# Patient Record
Sex: FEMALE | Race: BLACK OR AFRICAN AMERICAN | NOT HISPANIC OR LATINO | URBAN - METROPOLITAN AREA
[De-identification: names, ages, dates, MRNs, and addresses within clinical notes are randomized per-mention and may not be internally consistent; named-entity substitution may affect disease eponyms.]

---

## 2018-02-04 ENCOUNTER — INPATIENT (INPATIENT)
Facility: HOSPITAL | Age: 43
LOS: 1 days | Discharge: HOME | DRG: 156 | End: 2018-02-06
Attending: OTOLARYNGOLOGY | Admitting: EMERGENCY MEDICINE
Payer: COMMERCIAL

## 2018-02-04 VITALS
RESPIRATION RATE: 20 BRPM | DIASTOLIC BLOOD PRESSURE: 111 MMHG | HEART RATE: 84 BPM | TEMPERATURE: 100 F | OXYGEN SATURATION: 100 % | SYSTOLIC BLOOD PRESSURE: 172 MMHG

## 2018-02-04 DIAGNOSIS — R22.0 LOCALIZED SWELLING, MASS AND LUMP, HEAD: ICD-10-CM

## 2018-02-04 DIAGNOSIS — Z98.89 OTHER SPECIFIED POSTPROCEDURAL STATES: Chronic | ICD-10-CM

## 2018-02-04 DIAGNOSIS — K11.20 SIALOADENITIS, UNSPECIFIED: ICD-10-CM

## 2018-02-04 LAB
ALBUMIN SERPL ELPH-MCNC: 3.9 G/DL — SIGNIFICANT CHANGE UP (ref 3.3–5)
ALP SERPL-CCNC: 51 U/L — SIGNIFICANT CHANGE UP (ref 40–120)
ALT FLD-CCNC: 10 U/L RC — SIGNIFICANT CHANGE UP (ref 10–45)
ANION GAP SERPL CALC-SCNC: 12 MMOL/L — SIGNIFICANT CHANGE UP (ref 5–17)
AST SERPL-CCNC: 16 U/L — SIGNIFICANT CHANGE UP (ref 10–40)
BASOPHILS # BLD AUTO: 0 K/UL — SIGNIFICANT CHANGE UP (ref 0–0.2)
BASOPHILS NFR BLD AUTO: 0.2 % — SIGNIFICANT CHANGE UP (ref 0–2)
BILIRUB SERPL-MCNC: 0.6 MG/DL — SIGNIFICANT CHANGE UP (ref 0.2–1.2)
BUN SERPL-MCNC: 10 MG/DL — SIGNIFICANT CHANGE UP (ref 7–23)
CALCIUM SERPL-MCNC: 9.1 MG/DL — SIGNIFICANT CHANGE UP (ref 8.4–10.5)
CHLORIDE SERPL-SCNC: 100 MMOL/L — SIGNIFICANT CHANGE UP (ref 96–108)
CO2 SERPL-SCNC: 24 MMOL/L — SIGNIFICANT CHANGE UP (ref 22–31)
CREAT SERPL-MCNC: 0.85 MG/DL — SIGNIFICANT CHANGE UP (ref 0.5–1.3)
EOSINOPHIL # BLD AUTO: 0.1 K/UL — SIGNIFICANT CHANGE UP (ref 0–0.5)
EOSINOPHIL NFR BLD AUTO: 0.9 % — SIGNIFICANT CHANGE UP (ref 0–6)
GLUCOSE SERPL-MCNC: 99 MG/DL — SIGNIFICANT CHANGE UP (ref 70–99)
HCT VFR BLD CALC: 28.6 % — LOW (ref 34.5–45)
HGB BLD-MCNC: 9 G/DL — LOW (ref 11.5–15.5)
LYMPHOCYTES # BLD AUTO: 0.6 K/UL — LOW (ref 1–3.3)
LYMPHOCYTES # BLD AUTO: 9 % — LOW (ref 13–44)
MCHC RBC-ENTMCNC: 20.7 PG — LOW (ref 27–34)
MCHC RBC-ENTMCNC: 31.5 GM/DL — LOW (ref 32–36)
MCV RBC AUTO: 65.6 FL — LOW (ref 80–100)
MONOCYTES # BLD AUTO: 0.9 K/UL — SIGNIFICANT CHANGE UP (ref 0–0.9)
MONOCYTES NFR BLD AUTO: 13.6 % — SIGNIFICANT CHANGE UP (ref 2–14)
NEUTROPHILS # BLD AUTO: 5.2 K/UL — SIGNIFICANT CHANGE UP (ref 1.8–7.4)
NEUTROPHILS NFR BLD AUTO: 76.3 % — SIGNIFICANT CHANGE UP (ref 43–77)
PLATELET # BLD AUTO: 396 K/UL — SIGNIFICANT CHANGE UP (ref 150–400)
POTASSIUM SERPL-MCNC: 3.3 MMOL/L — LOW (ref 3.5–5.3)
POTASSIUM SERPL-SCNC: 3.3 MMOL/L — LOW (ref 3.5–5.3)
PROT SERPL-MCNC: 7.7 G/DL — SIGNIFICANT CHANGE UP (ref 6–8.3)
RAPID RVP RESULT: SIGNIFICANT CHANGE UP
RBC # BLD: 4.35 M/UL — SIGNIFICANT CHANGE UP (ref 3.8–5.2)
RBC # FLD: 19.7 % — HIGH (ref 10.3–14.5)
S PYO AG SPEC QL IA: NEGATIVE — SIGNIFICANT CHANGE UP
SODIUM SERPL-SCNC: 136 MMOL/L — SIGNIFICANT CHANGE UP (ref 135–145)
WBC # BLD: 6.9 K/UL — SIGNIFICANT CHANGE UP (ref 3.8–10.5)
WBC # FLD AUTO: 6.9 K/UL — SIGNIFICANT CHANGE UP (ref 3.8–10.5)

## 2018-02-04 PROCEDURE — 70491 CT SOFT TISSUE NECK W/DYE: CPT | Mod: 26

## 2018-02-04 PROCEDURE — 99285 EMERGENCY DEPT VISIT HI MDM: CPT

## 2018-02-04 RX ORDER — HYDROCHLOROTHIAZIDE 25 MG
12.5 TABLET ORAL ONCE
Qty: 0 | Refills: 0 | Status: COMPLETED | OUTPATIENT
Start: 2018-02-04 | End: 2018-02-04

## 2018-02-04 RX ORDER — ACETAMINOPHEN 500 MG
1000 TABLET ORAL ONCE
Qty: 0 | Refills: 0 | Status: COMPLETED | OUTPATIENT
Start: 2018-02-04 | End: 2018-02-04

## 2018-02-04 RX ORDER — SODIUM CHLORIDE 9 MG/ML
1000 INJECTION INTRAMUSCULAR; INTRAVENOUS; SUBCUTANEOUS ONCE
Qty: 0 | Refills: 0 | Status: COMPLETED | OUTPATIENT
Start: 2018-02-04 | End: 2018-02-04

## 2018-02-04 RX ORDER — DEXAMETHASONE 0.5 MG/5ML
10 ELIXIR ORAL EVERY 8 HOURS
Qty: 0 | Refills: 0 | Status: DISCONTINUED | OUTPATIENT
Start: 2018-02-04 | End: 2018-02-06

## 2018-02-04 RX ORDER — SODIUM CHLORIDE 9 MG/ML
1000 INJECTION INTRAMUSCULAR; INTRAVENOUS; SUBCUTANEOUS
Qty: 0 | Refills: 0 | Status: DISCONTINUED | OUTPATIENT
Start: 2018-02-04 | End: 2018-02-06

## 2018-02-04 RX ORDER — POTASSIUM CHLORIDE 20 MEQ
20 PACKET (EA) ORAL ONCE
Qty: 0 | Refills: 0 | Status: COMPLETED | OUTPATIENT
Start: 2018-02-04 | End: 2018-02-04

## 2018-02-04 RX ORDER — DEXAMETHASONE 0.5 MG/5ML
10 ELIXIR ORAL ONCE
Qty: 0 | Refills: 0 | Status: COMPLETED | OUTPATIENT
Start: 2018-02-04 | End: 2018-02-04

## 2018-02-04 RX ORDER — VALSARTAN 80 MG/1
160 TABLET ORAL ONCE
Qty: 0 | Refills: 0 | Status: COMPLETED | OUTPATIENT
Start: 2018-02-04 | End: 2018-02-04

## 2018-02-04 RX ORDER — ACETAMINOPHEN 500 MG
650 TABLET ORAL EVERY 6 HOURS
Qty: 0 | Refills: 0 | Status: DISCONTINUED | OUTPATIENT
Start: 2018-02-04 | End: 2018-02-06

## 2018-02-04 RX ADMIN — VALSARTAN 160 MILLIGRAM(S): 80 TABLET ORAL at 13:58

## 2018-02-04 RX ADMIN — Medication 1000 MILLIGRAM(S): at 12:59

## 2018-02-04 RX ADMIN — SODIUM CHLORIDE 2000 MILLILITER(S): 9 INJECTION INTRAMUSCULAR; INTRAVENOUS; SUBCUTANEOUS at 12:29

## 2018-02-04 RX ADMIN — SODIUM CHLORIDE 75 MILLILITER(S): 9 INJECTION INTRAMUSCULAR; INTRAVENOUS; SUBCUTANEOUS at 13:59

## 2018-02-04 RX ADMIN — Medication 102 MILLIGRAM(S): at 21:15

## 2018-02-04 RX ADMIN — Medication 20 MILLIEQUIVALENT(S): at 13:15

## 2018-02-04 RX ADMIN — Medication 100 MILLIGRAM(S): at 21:15

## 2018-02-04 RX ADMIN — Medication 100 MILLIGRAM(S): at 13:59

## 2018-02-04 RX ADMIN — Medication 400 MILLIGRAM(S): at 12:29

## 2018-02-04 RX ADMIN — Medication 12.5 MILLIGRAM(S): at 13:58

## 2018-02-04 RX ADMIN — Medication 102 MILLIGRAM(S): at 14:50

## 2018-02-04 NOTE — ED PROVIDER NOTE - OBJECTIVE STATEMENT
42F with HTN and anemia presents with L neck swelling, fever, and malaise x1 day. Pt had an sore throat 5 days ago without other URI symptoms under yesterday in the AM when she woke up with the neck swelling. She developed generalized malaise during the day and had a fever of 101F last night. Has odynophagia, throat feels congested with sputum but no cough. Also has headache, 6/10 severity. No dyspnea or chest pain. No nasal congestion or other URI symptoms. No recent dental procedures. No rashes. No abdominal pain or diarrhea. She never had similar neck swelling the past.

## 2018-02-04 NOTE — H&P ADULT - HISTORY OF PRESENT ILLNESS
41 y/o female with PMHx of HTN and anemia c/o L neck swelling, fever, and malaise x1 day. Patients reports she has had a sore throat for 5 days without other URI symptoms. Patient reports that yesterday AM she woke up with the neck swelling. She developed generalized malaise during the day and had a fever of 101F last night. Reports odynophagia, throat feels congested with sputum but no cough. Also has headache, 6/10 severity. No dyspnea or chest pain. No nasal congestion or other URI symptoms. No recent dental procedures. No rashes. No abdominal pain or diarrhea. She never had similar neck swelling the past. 41 y/o female with PMHx of HTN and anemia c/o L neck swelling, fever, and malaise x1 day. Patients reports she has had a sore throat for 5 days without other URI symptoms. Patient reports that yesterday AM she woke up with the neck swelling and developed generalized malaise during the day with a fever of 101F last night. Reports odynophagia, throat congested with sputum but no cough, headache, 6/10 severity. No dyspnea or chest pain. No nasal congestion or other URI symptoms. No recent dental procedures. No rashes. No abdominal pain or diarrhea. She never had similar neck swelling the past.

## 2018-02-04 NOTE — H&P ADULT - NSHPLABSRESULTS_GEN_ALL_CORE
CC:    Vital Signs Last 24 Hrs  T(C): 37.4 (04 Feb 2018 16:11), Max: 37.7 (04 Feb 2018 11:03)  T(F): 99.3 (04 Feb 2018 16:11), Max: 99.9 (04 Feb 2018 13:12)  HR: 84 (04 Feb 2018 16:11) (81 - 98)  BP: 163/88 (04 Feb 2018 16:11) (155/96 - 176/100)  BP(mean): --  RR: 18 (04 Feb 2018 16:11) (18 - 20)  SpO2: 94% (04 Feb 2018 16:11) (94% - 100%)                          9.0    6.9   )-----------( 396      ( 04 Feb 2018 12:29 )             28.6    02-04    136  |  100  |  10  ----------------------------<  99  3.3<L>   |  24  |  0.85    Ca    9.1      04 Feb 2018 12:15    TPro  7.7  /  Alb  3.9  /  TBili  0.6  /  DBili  x   /  AST  16  /  ALT  10  /  AlkPhos  51  02-04      CT of the Neck  Acute sialoadenitis of the left submandibular gland without associated   abscess formation. Vital Signs Last 24 Hrs  T(C): 37.4 (04 Feb 2018 16:11), Max: 37.7 (04 Feb 2018 11:03)  T(F): 99.3 (04 Feb 2018 16:11), Max: 99.9 (04 Feb 2018 13:12)  HR: 84 (04 Feb 2018 16:11) (81 - 98)  BP: 163/88 (04 Feb 2018 16:11) (155/96 - 176/100)  BP(mean): --  RR: 18 (04 Feb 2018 16:11) (18 - 20)  SpO2: 94% (04 Feb 2018 16:11) (94% - 100%)                          9.0    6.9   )-----------( 396      ( 04 Feb 2018 12:29 )             28.6    02-04    136  |  100  |  10  ----------------------------<  99  3.3<L>   |  24  |  0.85    Ca    9.1      04 Feb 2018 12:15    TPro  7.7  /  Alb  3.9  /  TBili  0.6  /  DBili  x   /  AST  16  /  ALT  10  /  AlkPhos  51  02-04      CT of the Neck  Acute sialoadenitis of the left submandibular gland without associated   abscess formation.

## 2018-02-04 NOTE — ED PROVIDER NOTE - PHYSICAL EXAMINATION
General: Appears comfortable, nontoxic appearing  Skin: Warm and dry, no rashes  Neuro: AAOx3, nonfocal  HEENT: PERRL, EOMI, no oral lesions  Neck: +L submandibular erythema, warmth, and swelling without contralateral LN abnormalities. Full range of motion, no JVD  Lymph: +L submandibular  and cervical lymphadenopathy, no R submandibular or cervical lymphadenopathy, no submental, auricular, supraclavicular, or axillary lymphadenopathy   Lungs: Clear to ascultation bilaterally, no wheezes, rales, or rhonchi   Heart: Regular rate and rhythm, no murmurs  Abdomen: soft, nontender, nondistended  Extremities: No pedal edema or LE tenderness  Vascular: 2+ dorsalis pedis and radial pulses  Psych: normal mood and affect

## 2018-02-04 NOTE — ED PROVIDER NOTE - ATTENDING CONTRIBUTION TO CARE
****ATTENDING**** 41yo f hx HTN pw L facial swelling and fever x 1 day. Pt reports sore throat on tuesday that resolved. yesterday w swelling and fever and this morning w marked swelling. No recent travel. No cough. No sob, palp. + able to swallow and no difficulty breathing.   On exam, Patient is awake,alert,oriented x 3. Patient is well appearing and in no acute distress. airway intact. + L submandibular swelling and tenderness. Patient's chest is clear to ausculation, +s1s2. Abdomen is soft nd/nt +BS. Extremity with no swelling or calf tenderness.   Check labs, CT to eval for sailoadenitis, lymphadenitis, abscess.

## 2018-02-04 NOTE — H&P ADULT - NSHPPHYSICALEXAM_GEN_ALL_CORE
General: Appears comfortable  Skin: Warm and dry, no rashes  HEENT: PERRL, EOMI, no oral lesions  Neck: +L submandibular erythema, warmth, and swelling without contralateral LN abnormalities. Full range of motion  Resp: no use of accessory muscles, no stridor  CV: no peripheral edema/cyanosis General: Appears comfortable  Skin: Warm and dry, no rashes  HEENT: PERRL, EOMI, no oral lesions  Neck: +L submandibular erythema, warmth, and swelling without contralateral LN abnormalities. Full range of motion  Resp: no use of accessory muscles, no stridor  CV: no peripheral edema/cyanosis    Laryngoscopy done at bedside with scope #4:  No bleeding in nasal pharynx or Oral pharynx.  No foreign body or pooling of secretions.  No glottic / supraglottic swelling.  Vocal cords mobile in intact b/l.  Airway patent.

## 2018-02-04 NOTE — PROGRESS NOTE ADULT - SUBJECTIVE AND OBJECTIVE BOX
POD/STATUS POST/ENT ISSUE: Left Submandibular Sialoadenitis     INTERVAL HPI: 41 y/o female found to have Left Submandibular Sialoadenitis on CT. Tolerating PO, pain slightly improved.         Vital Signs Last 24 Hrs  T(C): 37.4 (04 Feb 2018 16:11), Max: 37.7 (04 Feb 2018 11:03)  T(F): 99.3 (04 Feb 2018 16:11), Max: 99.9 (04 Feb 2018 13:12)  HR: 84 (04 Feb 2018 16:11) (81 - 98)  BP: 163/88 (04 Feb 2018 16:11) (155/96 - 176/100)  BP(mean): --  RR: 18 (04 Feb 2018 16:11) (18 - 20)  SpO2: 94% (04 Feb 2018 16:11) (94% - 100%)    PHYSICAL EXAM:  General: Appears comfortable  Skin: Warm and dry, no rashes  HEENT: PERRL, EOMI, no oral lesions  Neck: +L submandibular erythema, warmth, and swelling without contralateral LN abnormalities. Full range of motion  Resp: no use of accessory muscles, no stridor  CV: no peripheral edema/cyanosis    LABS:                        9.0    6.9   )-----------( 396      ( 04 Feb 2018 12:29 )             28.6       IMAGING/ADDITIONAL STUDIES:    CT of the Neck  Acute sialoadenitis of the left submandibular gland without associated   abscess formation.

## 2018-02-04 NOTE — PROGRESS NOTE ADULT - ASSESSMENT
41 y/o female found to have Left Submandibular Sialoadenitis on CT. Tolerating PO, pain slightly improved. On IV antibiotics, and decadron.

## 2018-02-04 NOTE — H&P ADULT - NSHPREVIEWOFSYSTEMS_GEN_ALL_CORE
CONSTITUTIONAL: +Fever, chills, malaise  EYES: No visual changes or diploplia  ENT: +sore throat, +odynophagia, no dysphagia, rhinorrhea, or nasal congestion  NECK: + L neck pain, no neck stiffness  RESPIRATORY: No cough or shortness of breath  CARDIOVASCULAR: No chest pain or palpitations  GASTROINTESTINAL: No abdominal pain, nausea, vomiting, diarrhea, melena, or hematochezia   GENITOURINARY: No dysuria, flank pain, hematuria, or incomplete emptying  NEUROLOGICAL: No numbness or focal weakness   MUSCULOSKELATAL: No joint pain  HEMATOLOGIC: No bleeding  SKIN: No rash or pruritus

## 2018-02-04 NOTE — ED PROVIDER NOTE - NS ED ROS FT
CONSTITUTIONAL: +Fever, chills, malaise  EYES: No visual changes or diploplia  ENT: +sore throat, +odynophagia, no dysphagia, rhinorrhea, or nasal congestion  NECK: + L neck pain, no neck stiffness  RESPIRATORY: No cough or shortness of breath  CARDIOVASCULAR: No chest pain or palpitations  GASTROINTESTINAL: No abdominal pain, nausea, vomiting, diarrhea, melena, or hematochezia   GENITOURINARY: No dysuria, flank pain, hematuria, or incomplete emptying  NEUROLOGICAL: No numbness or focal weakness   MUSCULOSKELATAL: No joint pain  HEMATOLOGIC: No bleeding  SKIN: No rash or pruritus  All other review of systems is negative unless indicated above

## 2018-02-04 NOTE — ED ADULT NURSE NOTE - OBJECTIVE STATEMENT
42 yr old female with h/o htn present to the ER for fever and swelling to the left side of the mandibular area and neck. Pt reported that she started to notice the swelling yesterday , however today she started to have body aches, fever and chills. Pt reportedly said she had sore throat a couple day ago which had resolved . Pt denies any gums disease, loose teeth or toothache. Pt reported pain level of 8/10 on pain scale and aching in quality. Report that she took Tylenol prior to ER arrival. Denies cough, nasal congestion / ear pain.

## 2018-02-04 NOTE — ED ADULT NURSE NOTE - CHPI ED SYMPTOMS NEG
no rash/no abdominal pain/no shortness of breath/no vomiting/no decreased eating/drinking/no diarrhea/no cough

## 2018-02-05 ENCOUNTER — TRANSCRIPTION ENCOUNTER (OUTPATIENT)
Age: 43
End: 2018-02-05

## 2018-02-05 LAB
HCT VFR BLD CALC: 29 % — LOW (ref 34.5–45)
HGB BLD-MCNC: 9.1 G/DL — LOW (ref 11.5–15.5)
MCHC RBC-ENTMCNC: 20.6 PG — LOW (ref 27–34)
MCHC RBC-ENTMCNC: 31.4 GM/DL — LOW (ref 32–36)
MCV RBC AUTO: 65.7 FL — LOW (ref 80–100)
PLATELET # BLD AUTO: 411 K/UL — HIGH (ref 150–400)
RBC # BLD: 4.41 M/UL — SIGNIFICANT CHANGE UP (ref 3.8–5.2)
RBC # FLD: 19.6 % — HIGH (ref 10.3–14.5)
WBC # BLD: 2.5 K/UL — LOW (ref 3.8–10.5)
WBC # FLD AUTO: 2.5 K/UL — LOW (ref 3.8–10.5)

## 2018-02-05 PROCEDURE — 99254 IP/OBS CNSLTJ NEW/EST MOD 60: CPT

## 2018-02-05 RX ORDER — HYDROCHLOROTHIAZIDE 25 MG
12.5 TABLET ORAL DAILY
Qty: 0 | Refills: 0 | Status: DISCONTINUED | OUTPATIENT
Start: 2018-02-05 | End: 2018-02-06

## 2018-02-05 RX ORDER — VALSARTAN 80 MG/1
160 TABLET ORAL DAILY
Qty: 0 | Refills: 0 | Status: DISCONTINUED | OUTPATIENT
Start: 2018-02-05 | End: 2018-02-06

## 2018-02-05 RX ORDER — AMLODIPINE BESYLATE 2.5 MG/1
5 TABLET ORAL DAILY
Qty: 0 | Refills: 0 | Status: DISCONTINUED | OUTPATIENT
Start: 2018-02-05 | End: 2018-02-06

## 2018-02-05 RX ADMIN — Medication 100 MILLIGRAM(S): at 15:01

## 2018-02-05 RX ADMIN — Medication 102 MILLIGRAM(S): at 21:27

## 2018-02-05 RX ADMIN — Medication 12.5 MILLIGRAM(S): at 13:42

## 2018-02-05 RX ADMIN — Medication 100 MILLIGRAM(S): at 21:27

## 2018-02-05 RX ADMIN — AMLODIPINE BESYLATE 5 MILLIGRAM(S): 2.5 TABLET ORAL at 13:42

## 2018-02-05 RX ADMIN — VALSARTAN 160 MILLIGRAM(S): 80 TABLET ORAL at 16:05

## 2018-02-05 RX ADMIN — SODIUM CHLORIDE 75 MILLILITER(S): 9 INJECTION INTRAMUSCULAR; INTRAVENOUS; SUBCUTANEOUS at 19:42

## 2018-02-05 RX ADMIN — Medication 100 MILLIGRAM(S): at 05:12

## 2018-02-05 RX ADMIN — Medication 102 MILLIGRAM(S): at 05:13

## 2018-02-05 RX ADMIN — Medication 102 MILLIGRAM(S): at 13:38

## 2018-02-05 NOTE — PROGRESS NOTE ADULT - SUBJECTIVE AND OBJECTIVE BOX
POD/STATUS POST/ENT ISSUE: Left Submandibular Sialoadenitis     INTERVAL HPI: 41 y/o female found to have Left Submandibular Sialoadenitis on CT. Tolerating PO, pain slightly improved.       Vital Signs Last 24 Hrs  T(C): 36.9 (05 Feb 2018 09:04), Max: 37.7 (04 Feb 2018 11:03)  T(F): 98.5 (05 Feb 2018 09:04), Max: 99.9 (04 Feb 2018 13:12)  HR: 97 (05 Feb 2018 09:04) (80 - 98)  BP: 156/95 (05 Feb 2018 09:04) (147/94 - 176/100)  BP(mean): --  RR: 18 (05 Feb 2018 09:04) (18 - 20)  SpO2: 97% (05 Feb 2018 09:04) (94% - 100%)    LABS:                        9.1    2.5   )-----------( 411      ( 05 Feb 2018 08:44 )             29.0       PHYSICAL EXAM:  Gen: NAD, well-developed  Head: Normocephalic, Atraumatic  Face: no edema/erythema/fluctuanceEyes:no scleral injection  Nose: Nares bilaterally patent, no discharge  Mouth: No Stridor / Drooling / Trismus.  Mucosa moist, tongue/uvula midline, oropharynx clear  Neck: +L submandibular erythema, warmth, and swelling without contralateral LN abnormalities. Full range of motion  Resp: breathing easily, no stridor  CV: no peripheral edema/cyanosis

## 2018-02-05 NOTE — PROGRESS NOTE ADULT - ASSESSMENT
43 y/o female found to have Left Submandibular Sialoadenitis on CT. Tolerating PO, pain slightly improved. On IV antibiotics, and decadron.

## 2018-02-06 VITALS
DIASTOLIC BLOOD PRESSURE: 80 MMHG | RESPIRATION RATE: 18 BRPM | SYSTOLIC BLOOD PRESSURE: 139 MMHG | HEART RATE: 66 BPM | OXYGEN SATURATION: 99 %

## 2018-02-06 LAB
ANION GAP SERPL CALC-SCNC: 11 MMOL/L — SIGNIFICANT CHANGE UP (ref 5–17)
BUN SERPL-MCNC: 12 MG/DL — SIGNIFICANT CHANGE UP (ref 7–23)
CALCIUM SERPL-MCNC: 9.2 MG/DL — SIGNIFICANT CHANGE UP (ref 8.4–10.5)
CHLORIDE SERPL-SCNC: 104 MMOL/L — SIGNIFICANT CHANGE UP (ref 96–108)
CO2 SERPL-SCNC: 26 MMOL/L — SIGNIFICANT CHANGE UP (ref 22–31)
CREAT SERPL-MCNC: 0.72 MG/DL — SIGNIFICANT CHANGE UP (ref 0.5–1.3)
GLUCOSE SERPL-MCNC: 178 MG/DL — HIGH (ref 70–99)
HCT VFR BLD CALC: 28.9 % — LOW (ref 34.5–45)
HGB BLD-MCNC: 9 G/DL — LOW (ref 11.5–15.5)
MCHC RBC-ENTMCNC: 20.6 PG — LOW (ref 27–34)
MCHC RBC-ENTMCNC: 31.2 GM/DL — LOW (ref 32–36)
MCV RBC AUTO: 66.1 FL — LOW (ref 80–100)
PLATELET # BLD AUTO: 400 K/UL — SIGNIFICANT CHANGE UP (ref 150–400)
POTASSIUM SERPL-MCNC: 3.5 MMOL/L — SIGNIFICANT CHANGE UP (ref 3.5–5.3)
POTASSIUM SERPL-SCNC: 3.5 MMOL/L — SIGNIFICANT CHANGE UP (ref 3.5–5.3)
RBC # BLD: 4.37 M/UL — SIGNIFICANT CHANGE UP (ref 3.8–5.2)
RBC # FLD: 19.7 % — HIGH (ref 10.3–14.5)
SODIUM SERPL-SCNC: 141 MMOL/L — SIGNIFICANT CHANGE UP (ref 135–145)
WBC # BLD: 6.9 K/UL — SIGNIFICANT CHANGE UP (ref 3.8–10.5)
WBC # FLD AUTO: 6.9 K/UL — SIGNIFICANT CHANGE UP (ref 3.8–10.5)

## 2018-02-06 PROCEDURE — 87581 M.PNEUMON DNA AMP PROBE: CPT

## 2018-02-06 PROCEDURE — 80053 COMPREHEN METABOLIC PANEL: CPT

## 2018-02-06 PROCEDURE — 80048 BASIC METABOLIC PNL TOTAL CA: CPT

## 2018-02-06 PROCEDURE — 99285 EMERGENCY DEPT VISIT HI MDM: CPT | Mod: 25

## 2018-02-06 PROCEDURE — 96374 THER/PROPH/DIAG INJ IV PUSH: CPT | Mod: XU

## 2018-02-06 PROCEDURE — 87633 RESP VIRUS 12-25 TARGETS: CPT

## 2018-02-06 PROCEDURE — 87798 DETECT AGENT NOS DNA AMP: CPT

## 2018-02-06 PROCEDURE — 87081 CULTURE SCREEN ONLY: CPT

## 2018-02-06 PROCEDURE — 87486 CHLMYD PNEUM DNA AMP PROBE: CPT

## 2018-02-06 PROCEDURE — 87880 STREP A ASSAY W/OPTIC: CPT

## 2018-02-06 PROCEDURE — 85027 COMPLETE CBC AUTOMATED: CPT

## 2018-02-06 PROCEDURE — 70491 CT SOFT TISSUE NECK W/DYE: CPT

## 2018-02-06 RX ORDER — AMLODIPINE BESYLATE 2.5 MG/1
1 TABLET ORAL
Qty: 0 | Refills: 0 | COMMUNITY

## 2018-02-06 RX ADMIN — AMLODIPINE BESYLATE 5 MILLIGRAM(S): 2.5 TABLET ORAL at 05:38

## 2018-02-06 RX ADMIN — Medication 100 MILLIGRAM(S): at 05:37

## 2018-02-06 RX ADMIN — Medication 12.5 MILLIGRAM(S): at 05:38

## 2018-02-06 RX ADMIN — Medication 102 MILLIGRAM(S): at 05:37

## 2018-02-06 RX ADMIN — VALSARTAN 160 MILLIGRAM(S): 80 TABLET ORAL at 05:38

## 2018-02-06 NOTE — DISCHARGE NOTE ADULT - MEDICATION SUMMARY - MEDICATIONS TO TAKE
I will START or STAY ON the medications listed below when I get home from the hospital:    Diovan HCT 12.5 mg-160 mg oral tablet  -- 1 tab(s) by mouth once a day  -- Indication: For htn    clindamycin 300 mg oral capsule  -- 1 cap(s) by mouth every 8 hours   -- Finish all this medication unless otherwise directed by prescriber.  Medication should be taken with plenty of water.    -- Indication: For infection

## 2018-02-06 NOTE — DISCHARGE NOTE ADULT - CARE PLAN
Principal Discharge DX:	Submandibular sialoadenitis  Goal:	Treat infection  Assessment and plan of treatment:	Acute infection of the salivary gland

## 2018-02-06 NOTE — DISCHARGE NOTE ADULT - HOSPITAL COURSE
43 y/o female with PMHx of HTN and anemia c/o L neck swelling, fever, and malaise x1 day. Patients reports she has had a sore throat for 5 days without other URI symptoms. Patient reports that yesterday AM she woke up with the neck swelling and developed generalized malaise during the day with a fever of 101F last night. Reports odynophagia, throat congested with sputum but no cough, headache, 6/10 severity. No dyspnea or chest pain. No nasal congestion or other URI symptoms. No recent dental procedures. No rashes. No abdominal pain or diarrhea. She never had similar neck swelling the past.    Tx with IV clindamycin and decadron. Blood pressures controlled with diovan, norvasc, HCTZ.

## 2018-02-06 NOTE — PROGRESS NOTE ADULT - PROBLEM SELECTOR PLAN 1
continue clindamycin as outpt.  F/U with Dr. Rider as outpt.  Sour candies PRN  Warm compresses PRN  Pt. to be discharged home today

## 2018-02-06 NOTE — DISCHARGE NOTE ADULT - MEDICATION SUMMARY - MEDICATIONS TO CHANGE
I will SWITCH the dose or number of times a day I take the medications listed below when I get home from the hospital:    Diovan HCT 12.5 mg-160 mg oral tablet  -- 1 tab(s) by mouth once a day    Flagyl 500 mg oral tablet  -- 1 tab(s) by mouth every 8 hours MDD:3  -- Do not drink alcoholic beverages when taking this medication.  Finish all this medication unless otherwise directed by prescriber.  May discolor urine or feces.    Norvasc 5 mg oral tablet  -- 1 tab(s) by mouth once a day

## 2018-02-06 NOTE — PROGRESS NOTE ADULT - ASSESSMENT
[facility] notified.   41 yo female admitted with left submandibular sialoadenitis  treated with antibiotics and steroids.

## 2018-02-06 NOTE — DISCHARGE NOTE ADULT - CARE PROVIDER_API CALL
John Rider), Otolaryngology  [address] [place] [address]  Phone: [phone]  Fax: [phone]

## 2018-02-06 NOTE — PROGRESS NOTE ADULT - SUBJECTIVE AND OBJECTIVE BOX
POD/STATUS POST/ENT ISSUE: Left submandibular sialoadenitis    INTERVAL HPI/OVERNIGHT EVENTS: 43 yo female diagnosed with left sided submandibular sialoadenitis on CT. Pt. is feeling much improved and is able to tolerate a diet. We repeated lab tests this morning (last WBC 2.5 and K 3.3) and saw WBC is now 6.9 and potassium 3.5.   Pt. advised to follow up with Dr. Rider in one week.    Vital Signs Last 24 Hrs  T(C): 36.4 (06 Feb 2018 05:06), Max: 37 (06 Feb 2018 00:52)  T(F): 97.5 (06 Feb 2018 05:06), Max: 98.6 (06 Feb 2018 00:52)  HR: 71 (06 Feb 2018 05:06) (71 - 90)  BP: 144/82 (06 Feb 2018 05:06) (144/82 - 169/84)  BP(mean): --  RR: 18 (06 Feb 2018 05:06) (18 - 18)  SpO2: 98% (06 Feb 2018 05:06) (96% - 98%)    LABS:                        9.0    6.9   )-----------( 400      ( 06 Feb 2018 09:03 )             28.9       PHYSICAL EXAM:  Gen: NAD, well-developed, speaking in full sentences  Head: Normocephalic, Atraumatic  Eyes: PERRL, EOMI, no scleral injection  Nose: Nares bilaterally patent, no discharge  Mouth: Mucosa moist, tongue/uvula midline, oropharynx clear, no trismus, no drooling  Neck : mild swelling noted on left side , no erythema, no tenderness to palpation  Resp: breathing easily, no stridor  CV: no peripheral edema/cyanosis    IMAGING/ADDITIONAL STUDIES:

## 2018-02-06 NOTE — DISCHARGE NOTE ADULT - MEDICATION SUMMARY - MEDICATIONS TO STOP TAKING
I will STOP taking the medications listed below when I get home from the hospital:    doxycycline hyclate 100 mg oral tablet  -- 1 tab(s) by mouth 2 times a day MDD:2  -- Avoid prolonged or excessive exposure to direct and/or artificial sunlight while taking this medication.  Do not take this drug if you are pregnant.  Finish all this medication unless otherwise directed by prescriber.  Medication should be taken with plenty of water.

## 2018-02-06 NOTE — DISCHARGE NOTE ADULT - PATIENT PORTAL LINK FT
You can access [facility], offered by [facility], by registering with the following website: [email or web address]

## 2018-05-09 ENCOUNTER — RESULT REVIEW (OUTPATIENT)
Age: 43
End: 2018-05-09

## 2018-06-07 ENCOUNTER — RESULT REVIEW (OUTPATIENT)
Age: 43
End: 2018-06-07

## 2018-07-31 ENCOUNTER — EMERGENCY (EMERGENCY)
Facility: HOSPITAL | Age: 43
LOS: 1 days | Discharge: HOME | End: 2018-07-31
Attending: EMERGENCY MEDICINE
Payer: COMMERCIAL

## 2018-07-31 VITALS
DIASTOLIC BLOOD PRESSURE: 93 MMHG | TEMPERATURE: 98 F | RESPIRATION RATE: 18 BRPM | HEIGHT: 65 IN | SYSTOLIC BLOOD PRESSURE: 191 MMHG | WEIGHT: 169.98 LBS | OXYGEN SATURATION: 100 % | HEART RATE: 73 BPM

## 2018-07-31 DIAGNOSIS — Z98.89 OTHER SPECIFIED POSTPROCEDURAL STATES: Chronic | ICD-10-CM

## 2018-07-31 LAB
ALBUMIN SERPL ELPH-MCNC: 4.5 G/DL — SIGNIFICANT CHANGE UP (ref 3.3–5)
ALP SERPL-CCNC: 66 U/L — SIGNIFICANT CHANGE UP (ref 40–120)
ALT FLD-CCNC: 12 U/L — SIGNIFICANT CHANGE UP (ref 10–45)
ANION GAP SERPL CALC-SCNC: 14 MMOL/L — SIGNIFICANT CHANGE UP (ref 5–17)
AST SERPL-CCNC: 20 U/L — SIGNIFICANT CHANGE UP (ref 10–40)
BILIRUB SERPL-MCNC: 0.4 MG/DL — SIGNIFICANT CHANGE UP (ref 0.2–1.2)
BUN SERPL-MCNC: 12 MG/DL — SIGNIFICANT CHANGE UP (ref 7–23)
CALCIUM SERPL-MCNC: 10.1 MG/DL — SIGNIFICANT CHANGE UP (ref 8.4–10.5)
CHLORIDE SERPL-SCNC: 98 MMOL/L — SIGNIFICANT CHANGE UP (ref 96–108)
CO2 SERPL-SCNC: 27 MMOL/L — SIGNIFICANT CHANGE UP (ref 22–31)
CREAT SERPL-MCNC: 0.85 MG/DL — SIGNIFICANT CHANGE UP (ref 0.5–1.3)
GLUCOSE SERPL-MCNC: 126 MG/DL — HIGH (ref 70–99)
HCG SERPL-ACNC: <2 MIU/ML — SIGNIFICANT CHANGE UP
HCT VFR BLD CALC: 33.4 % — LOW (ref 34.5–45)
HGB BLD-MCNC: 9.8 G/DL — LOW (ref 11.5–15.5)
MCHC RBC-ENTMCNC: 19 PG — LOW (ref 27–34)
MCHC RBC-ENTMCNC: 29.5 GM/DL — LOW (ref 32–36)
MCV RBC AUTO: 64.3 FL — LOW (ref 80–100)
PLATELET # BLD AUTO: 456 K/UL — HIGH (ref 150–400)
POTASSIUM SERPL-MCNC: 3.3 MMOL/L — LOW (ref 3.5–5.3)
POTASSIUM SERPL-SCNC: 3.3 MMOL/L — LOW (ref 3.5–5.3)
PROT SERPL-MCNC: 8.7 G/DL — HIGH (ref 6–8.3)
RBC # BLD: 5.19 M/UL — SIGNIFICANT CHANGE UP (ref 3.8–5.2)
RBC # FLD: 21.1 % — HIGH (ref 10.3–14.5)
SODIUM SERPL-SCNC: 139 MMOL/L — SIGNIFICANT CHANGE UP (ref 135–145)
WBC # BLD: 10.2 K/UL — SIGNIFICANT CHANGE UP (ref 3.8–10.5)
WBC # FLD AUTO: 10.2 K/UL — SIGNIFICANT CHANGE UP (ref 3.8–10.5)

## 2018-07-31 PROCEDURE — 93010 ELECTROCARDIOGRAM REPORT: CPT | Mod: NC

## 2018-07-31 PROCEDURE — 99284 EMERGENCY DEPT VISIT MOD MDM: CPT | Mod: 25

## 2018-07-31 RX ORDER — ONDANSETRON 8 MG/1
4 TABLET, FILM COATED ORAL ONCE
Qty: 0 | Refills: 0 | Status: COMPLETED | OUTPATIENT
Start: 2018-07-31 | End: 2018-07-31

## 2018-07-31 RX ADMIN — ONDANSETRON 4 MILLIGRAM(S): 8 TABLET, FILM COATED ORAL at 23:15

## 2018-07-31 NOTE — ED ADULT NURSE NOTE - NSIMPLEMENTINTERV_GEN_ALL_ED
Implemented All Universal Safety Interventions:  [place] to call system. Call bell, personal items and telephone within reach. Instruct patient to call for assistance. Room bathroom lighting operational. Non-slip footwear when patient is off stretcher. Physically safe environment: no spills, clutter or unnecessary equipment. Stretcher in lowest position, wheels locked, appropriate side rails in place.

## 2018-07-31 NOTE — ED ADULT NURSE NOTE - OBJECTIVE STATEMENT
41 yo F presents c/o dizziness w/ HTN. States she has a hx of HTN, is non compliant w/ medications due to sometimes forgetting to take them. Did not take medications today. Became dizzy at home, worse when laying down, took BP at that time and noticed it was high - unable to provide exact values. Currently VSS. States dizziness is better but still present. Denies change in vision, CP, SOB, N/V, HA, abdominal pain, constipation, diarrhea, urinary complaints, fever, chills. IV access and labs obtained.

## 2018-08-01 VITALS — OXYGEN SATURATION: 99 % | TEMPERATURE: 98 F | RESPIRATION RATE: 18 BRPM

## 2018-08-01 PROBLEM — D64.9 ANEMIA, UNSPECIFIED: Chronic | Status: ACTIVE | Noted: 2018-02-04

## 2018-08-01 LAB
BASOPHILS # BLD AUTO: 0.1 K/UL — SIGNIFICANT CHANGE UP (ref 0–0.2)
EOSINOPHIL # BLD AUTO: 0.1 K/UL — SIGNIFICANT CHANGE UP (ref 0–0.5)
LYMPHOCYTES # BLD AUTO: 1.7 K/UL — SIGNIFICANT CHANGE UP (ref 1–3.3)
LYMPHOCYTES # BLD AUTO: 15 % — SIGNIFICANT CHANGE UP (ref 13–44)
MONOCYTES # BLD AUTO: 0.7 K/UL — SIGNIFICANT CHANGE UP (ref 0–0.9)
MONOCYTES NFR BLD AUTO: 4 % — SIGNIFICANT CHANGE UP (ref 2–14)
NEUTROPHILS # BLD AUTO: 7.7 K/UL — HIGH (ref 1.8–7.4)
NEUTROPHILS NFR BLD AUTO: 81 % — HIGH (ref 43–77)
TROPONIN T, HIGH SENSITIVITY RESULT: 10 NG/L — SIGNIFICANT CHANGE UP (ref 0–51)
TROPONIN T, HIGH SENSITIVITY RESULT: 10 NG/L — SIGNIFICANT CHANGE UP (ref 0–51)

## 2018-08-01 PROCEDURE — 70450 CT HEAD/BRAIN W/O DYE: CPT

## 2018-08-01 PROCEDURE — 85027 COMPLETE CBC AUTOMATED: CPT

## 2018-08-01 PROCEDURE — 84484 ASSAY OF TROPONIN QUANT: CPT

## 2018-08-01 PROCEDURE — 99284 EMERGENCY DEPT VISIT MOD MDM: CPT | Mod: 25

## 2018-08-01 PROCEDURE — 93005 ELECTROCARDIOGRAM TRACING: CPT

## 2018-08-01 PROCEDURE — 96374 THER/PROPH/DIAG INJ IV PUSH: CPT

## 2018-08-01 PROCEDURE — 84702 CHORIONIC GONADOTROPIN TEST: CPT

## 2018-08-01 PROCEDURE — 70450 CT HEAD/BRAIN W/O DYE: CPT | Mod: 26

## 2018-08-01 PROCEDURE — 80053 COMPREHEN METABOLIC PANEL: CPT

## 2018-08-01 RX ORDER — POTASSIUM CHLORIDE 20 MEQ
40 PACKET (EA) ORAL ONCE
Qty: 0 | Refills: 0 | Status: COMPLETED | OUTPATIENT
Start: 2018-08-01 | End: 2018-08-01

## 2018-08-01 RX ORDER — SODIUM CHLORIDE 9 MG/ML
1000 INJECTION INTRAMUSCULAR; INTRAVENOUS; SUBCUTANEOUS ONCE
Qty: 0 | Refills: 0 | Status: COMPLETED | OUTPATIENT
Start: 2018-08-01 | End: 2018-08-01

## 2018-08-01 RX ADMIN — Medication 40 MILLIEQUIVALENT(S): at 01:52

## 2018-08-01 RX ADMIN — SODIUM CHLORIDE 1000 MILLILITER(S): 9 INJECTION INTRAMUSCULAR; INTRAVENOUS; SUBCUTANEOUS at 01:53

## 2018-08-01 NOTE — ED PROVIDER NOTE - OBJECTIVE STATEMENT
Attending MD Anglin: 42 Attending MD Anglin: 42F with dizziness since 5pm, reports sudden onset, worse with laying supine, improved with sitting up.  Reports TIA a year ago.  Reports elevated BP prior to arrival.      ON exam, neurovasc intact, neg [place] hallpike    A/P: 42F with dizziness, will obtain EKG, CT head, labs, trop, reassess Attending MD Anglin: 42F with dizziness/spinning since 5pm, reports sudden onset, worse with laying supine, improved with sitting up.   Reports elevated BP prior to arrival.  Reports has had previous episodes.  Reports slight headache, nausea.  Reports came in because was concerned since had possible TIA a year ago.  Reports different than that episode because at that time had unilateral weakness.  Denies chest pain, shortness of breath, palpitations. Denies abdominal pain, vomiting, diarrhea, blood in stools. Denies numbness/weakness/tingling in extremities.  Denies difficulty speaking, slurring of speech, drooling.  Denies tinnitus, ear pain, change in hearing. Denies change in vision, double vision, sudden loss of vision.  PMH anemia, HTN, TIA. PSH D&C.  Meds: Diovan, Amlodipine, HCTZ.  Denies allergies.  Denies EtOH, tobacco, drugs.  On exam, NAD, CN 2-12 grossly intact, head NCAT, PERRL, neg [place] Hallpike, FROM at neck, no tenderness to palpation or stepoffs along length of spine, lungs CTAB with good inspiratory effort, +S1S2, no m/r/g, abdomen soft with +BS, NT, ND, no CVAT, moving all extremities with 5/5 strength bilateral upper and lower extremities, good and equal  strength bilaterally, neurovascularly intact, sensory grossly intact; A/P: 42F with dizziness, Ddx includes presyncope, electrolyte abnormality, lower suspicion for TIA but given history will obtain EKG, CT head, labs, trop, reassess

## 2018-11-30 NOTE — PROGRESS NOTE ADULT - PROBLEM/PLAN-1
Reason For Visit  BELKIS SANCHES is an established patient here today for. throat sore.   :  services not used.   BELKIS SANCHES was offered a chaperone, but declined. He is unaccompanied.        Quality    Adult Wellness CI height documented, discussion of regular exercise, exercising regularly, printed information given for activities, discussion of nutritional quality of diet, patient education given about proper diet, alcohol use, tobacco use, does not have feelings of hopelessness (PHQ-2), no Anhedonia (PHQ-2), no preventive medicine therapy for influenza, no preventive medicine therapy for pneumococcal, pain scale level not reviewed and taking aspirin.   COPD CI tobacco use, provided intervention and counseling in regards to tobacco use, no preventive medicine therapy for influenza, no preventive medicine therapy for pneumococcal and no preventive medicine services Beta Agonist/Anticholinergic.      History of Present Illness  53 yr old male present with c/o throat pain  started 2 months ago   describe pain as \"burning pain\"  current smoker; thinking about quitting  Hx of COPD  reports having burning sensation in throat when laying down and on waking up  eats late night and sleeps right after eating  diet not healthy  wants to know about COPD  8lbs weight loss. illness since last visit Symptoms include nasal passage blockage, cough, dyspnea, shortness of breath, sore throat and pain, but no fever, no chills, no generalized muscle aches, no anorexia, no nasal discharge, no wheezing, no hoarseness, no headache, no nausea, no vomiting, no diarrhea, no constipation, no abdominal pain and no rash.   COPD: The patient is currently experiencing symptoms: dyspnea on exertion and non-productive cough. Onset was gradual 2 month(s) ago. Onset followed an upper respiratory infection. He describes this as worsening. Exacerbating factors: smoking. Associated symptoms: upper respiratory infection symptoms,  but no fever, no weakness, no general malaise, no leg edema and no chest pain. Pertinent History: no pertinent medical history reported . COPD.      Review of Systems    Const: recent 8 weight loss . Per HPI.   Allergy & Immunology:. Per HPI.       Allergies  No Known Drug Allergies    Current Meds   1. Bisacodyl Laxative 5 MG TBEC; TAKE 2 TABLET AS DIRECTED;   Therapy: 06Oct2015 to (Last Rx:[id number])  Requested for: 06Oct2015 Ordered   2. Erythromycin 5 MG/GM Ophthalmic Ointment; Apply a thin film of ointment to the involved   eye twice a  day for 5 days;   Therapy: 86Kqu6489 to (Evaluate:03Edz1234); Last Rx:[id number] Ordered   3. Ketoconazole 2 % External Cream; APPLY SPARINGLY TO AFFECTED AREA(S) TWICE   DAILY;   Therapy: 14Mar2017 to (Evaluate:78Ffv7038)  Requested for: 14Mar2017; Last   Rx:[id number] Ordered   4. MethylPREDNISolone 4 MG Oral Tablet Therapy Pack; take AD per blister card   instructions;   Therapy: 06Oct2016 to (Last Rx:[id number])  Requested for: 93Iel5407 Ordered   5. Naproxen 500 MG Oral Tablet; TAKE 1 TABLET BY MOUTH TWICE DAILY AFTER A MEAL;   Therapy: 06Oct2016 to (Evaluate:72Crt6505)  Requested for: 06Oct2016; Last   Rx:[id number] Ordered   6. Omeprazole 40 MG Oral Capsule Delayed Release; TAKE ONE CAPSULE BY MOUTH   EVERY DAY;   Therapy: 25Oct2017 to (Evaluate:22Toz2049)  Requested for: 06Eyr4601; Last   Rx:[id number] Ordered   7. PEG 3350/Electrolytes 240 GM Oral Solution Reconstituted; TAKE AS DIRECTED;   Therapy: 06Oct2015 to (Evaluate:67Orr7379)  Requested for: 17Etv4081; Last   Rx:[id number] Ordered   8. RaNITidine HCl - 150 MG Oral Tablet; TAKE 1 TABLET BY MOUTH TWICE DAILY;   Therapy: 06Oct2016 to (Evaluate:22Jjn7012)  Requested for: 28Mcz9839; Last   Rx:[id number] Ordered   9. Tobramycin-Dexamethasone 0.3-0.1 % Ophthalmic Suspension; INSTILL 1-2 DROPS   INTO AFFECTED EYE(S) 4 TIMES DAILY AS DIRECTED;   Therapy: 30Xuc2289 to (Evaluate:08Ilz2589)  Requested for: 01Sep2016; Last    Rx:[id number] Ordered   10. Viagra 100 MG Oral Tablet; take one tablet po one hour before sexual activity as needed    as directed;    Therapy: 03Apr2015 to (Evaluate:28Oct2016)  Requested for: 06Oct2016; Last    Rx:06Oct2016 Ordered   11. Vitamin D (Ergocalciferol) 76984 UNIT Oral Capsule; TAKE ONE CAPSULE BY MOUTH    EVERY WEEK;    Therapy: 19Mar2017 to (Evaluate:05Jgs1412)  Requested for: 19Mar2017; Last    Rx:[id number] Ordered    Active Problems  Abdominal pain (R10.9)  Abnormal glucose (R73.09)  Acute conjunctivitis, right eye (H10.31)  Avitaminosis D (E55.9)  COPD, mild (J44.9)  Elevated CPK (R74.8)   · non specific/resolving/normal aldolase  Elevated liver function tests (R94.5)  Lower extremity pain, diffuse, unspecified laterality (M79.606)  Male erectile disorder (N52.9)  Obesity (BMI 30.0-34.9) (E66.9)  Pain of right thumb (M79.644)  Plantar fasciitis (M72.2)  Prostate cancer screening encounter, options and risks discussed (Z12.5)  Skin rash (R21)   on back/empiric antifungal cream  Sty, external, left (H00.016)  Stye (H00.019)  Thumb injury (S69.90XA)  Tobacco abuse counseling (Z71.6)    Past Medical History  History of Foot pain (M79.673)  Denied: History of hypertension    Family History  Mother   Denied: Family history of colon cancer (Z80.0)  Denied: Family history of coronary arteriosclerosis  Father   Denied: Family history of colon cancer (Z80.0)  Denied: Family history of coronary arteriosclerosis  Sibling   Family history of diabetes mellitus (Z83.3)    Social History  Current every day smoker (F17.200)    Vitals  Signs   Recorded: 21Sep2018 12:09PM   Height: 6 ft 1 in  Weight: 246 lb 9.28 oz  BMI Calculated: 32.53  BSA Calculated: 2.35  Systolic: 130  Diastolic: 70  Temperature: 97.5 F  Heart Rate: 68  Respiration: 14  O2 Saturation: 98  Pain Scale: 00    Physical Exam  Constitutional: alert and  well developed and well nourished.   Head and Face: atraumatic, normocephalic.   Eyes: no discharge  and no eyelid swelling.   ENT: normal appearing outer ear, normal appearing nose. . The right tympanic membrane was red and was bulging. The left tympanic membrane was red. . Right nasal turbinates: abnormal inferior turbinate. Left nasal turbinates: abnormal inferior turbinate. normal lips.   Neck: normal appearing neck.   Lymphatic: Lymph Nodes: bilateral submandibular node enlargement and submental node enlargement.   Pulmonary: no respiratory distress and normal respiratory rate and effort. . Auscultation of the lungs revealed expiratory wheezing. wheezing over the left base. the chest was normal to percussion.   Cardiovascular: normal rate, no murmurs were heard, regular rhythm, normal S1 and normal S2. edema was not present in the lower extremities.   Abdomen: soft and nontender.   Musculoskeletal: normal gait. no musculoskeletal erythema was seen, no joint swelling seen and no joint tenderness was elicited. no scoliosis. normal range of motion. there was no joint instability noted. muscle strength and tone were normal.   Neurologic: cranial nerves grossly intact. no sensory deficits noted. no coordination deficits. normal gait. muscle strength and tone were normal.   Psychiatric: oriented to person, oriented to place and oriented to time. alert and awake, interactive and mood/affect were appropriate. judgement not impaired. normal attention span. short term memory intact.   Skin, Hair, Nails: normal skin color and pigmentation and no rash. no foot ulcers and no skin ulcer was seen. normal skin turgor. no clubbing or cyanosis of the fingernails.      Immunizations  Immunization Status: Declined influenza vaccine.      Assessment  COPD, mild (J44.9)  Upper respiratory infection (J06.9)  Allergic rhinitis (J30.9)  Acid reflux (K21.9)  Tobacco abuse counseling (Z71.6)    Plan  Amoxicillin-Pot Clavulanate 875-125 MG Oral Tablet; TAKE 1 TABLET EVERY 12  HOURS  Fluticasone Propionate 50 MCG/ACT Nasal Suspension;  DISPLAY PLAN FREE TEXT INSTILL 1 SQUIRT  each  nostril bid  Symbicort 80-4.5 MCG/ACT Inhalation Aerosol; INHALE 2 PUFFS TWICE DAILY.  RINSE MOUTH AFTER USE  Omeprazole 40 MG Oral Capsule Delayed Release; TAKE ONE CAPSULE BY  MOUTH EVERY DAY  XR CHEST 2V; Status:Complete;   Done: 32Qnh9528  Plans:     Plan:   started antibiotic for URI and discussed smoking cessation with patient.      COPD  -chest x-ray ordered  -bronchodilator started  -rinse mouth after using  -printed COPD literature for patient from [facility] web site  -smoking cessation encouraged and discussed; will f/u with Dr. Mathias, PCP    Allergic Rhinitis  -started on Flonase  -smoking cessation encourage and discussed    Acid Reflux  -renewed omeprazole   -educated on avoiding fried and fatty foods  -avoid going to bed on full stomach; wait 3-4 hrs after eating before laying down  -smoking cessation encouraged    .   Follow-up in the office in 2 week(s).   URI:   Take antibiotics as directed.    Push fluids.    Counseled on tobacco education.    Medical compliance with plan discussed and risks of non-compliance reviewed.    Patient education completed on disease process, etiology & prognosis.    Patient expresses understanding of the plan.    Proper usage and side effects of medications reviewed & discussed.    Refer to orders.    Return to clinic as clinically indicated as discussed with patient who verbalized understanding of & agreement with the plan.      Signatures   Electronically signed by : Angely Farley CMA; Sep 21 2018 12:17PM CST    Electronically signed by : JUANJOSE CHÁVEZ; Sep 21 2018  1:57PM CST (Author)

## 2019-04-24 NOTE — ED PROVIDER NOTE - NS ED MD DISPO SPECIAL CONSIDERATION1
Post-Anesthesia Evaluation and Assessment    Patient: Mague Peck MRN: [id number]  SSN: [id number]    YOB: 1954  Age: 59 y.o. Sex: male      I have evaluated the patient and they are stable and ready for discharge from the PACU. Cardiovascular Function/Vital Signs  Visit Vitals  /69   Pulse 64   Temp 36.7 °C (98.1 °F)   Resp 20   Ht 5' 10\" (1.778 m)   Wt 102.1 kg (225 lb)   SpO2 92%   BMI 32.28 kg/m²       Patient is status post MAC anesthesia for Procedure(s):  ESOPHAGOGASTRODUODENOSCOPY (EGD)  ENDOSCOPIC HALO ABLATION. Nausea/Vomiting: None    Postoperative hydration reviewed and adequate. Pain:  Pain Scale 1: Visual (04/24/19 1133)  Pain Intensity 1: 0 (04/24/19 1133)   Managed    Neurological Status: At baseline    Mental Status, Level of Consciousness: Alert and  oriented to person, place, and time    Pulmonary Status:   O2 Device: Room air (04/24/19 1133)   Adequate oxygenation and airway patent    Complications related to anesthesia: None    Post-anesthesia assessment completed. No concerns    Signed By: Kate Spann MD     April 24, 2019              Procedure(s):  ESOPHAGOGASTRODUODENOSCOPY (EGD)  ENDOSCOPIC HALO ABLATION.     MAC    <BSHSIANPOST>    Vitals Value Taken Time   /69 4/24/2019 11:33 AM   Temp     Pulse 64 4/24/2019 11:33 AM   Resp 20 4/24/2019 11:33 AM   SpO2 92 % 4/24/2019 11:33 AM None

## 2020-09-10 ENCOUNTER — EMERGENCY (EMERGENCY)
Facility: HOSPITAL | Age: 45
LOS: 0 days | Discharge: HOME | End: 2020-09-10
Attending: STUDENT IN AN ORGANIZED HEALTH CARE EDUCATION/TRAINING PROGRAM
Payer: COMMERCIAL

## 2020-09-10 VITALS
WEIGHT: 167.99 LBS | DIASTOLIC BLOOD PRESSURE: 111 MMHG | RESPIRATION RATE: 17 BRPM | OXYGEN SATURATION: 98 % | TEMPERATURE: 98 F | HEART RATE: 80 BPM | HEIGHT: 65 IN | SYSTOLIC BLOOD PRESSURE: 176 MMHG

## 2020-09-10 DIAGNOSIS — Z98.89 OTHER SPECIFIED POSTPROCEDURAL STATES: Chronic | ICD-10-CM
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
## 2020-09-10 PROCEDURE — 99283 EMERGENCY DEPT VISIT LOW MDM: CPT

## 2020-09-10 RX ORDER — EPINEPHRINE 0.3 MG/.3ML
0.3 INJECTION INTRAMUSCULAR; SUBCUTANEOUS
Qty: 0.3 | Refills: 0
Start: 2020-09-10 | End: 2020-09-10

## 2020-09-10 NOTE — ED PROVIDER NOTE - CLINICAL SUMMARY MEDICAL DECISION MAKING FREE TEXT BOX
44 y/o F presenting with allergic reaction s/p taking clindamycin. Patient took Benadryl at home. Plan to observe Patient to ensure no worsening symptoms or airway obstruction. Likely to DC home with epi pen. Recommended to continue benadryl and to note clindamycin allergy. 44 y/o F presenting with allergic reaction s/p taking clindamycin. Patient took Benadryl at home, discussed appropriate dosing of Benadryl. Plan to observe Patient to ensure no worsening symptoms or airway obstruction. Likely to DC home with epi pen. Recommended to continue benadryl and to note clindamycin allergy.

## 2020-09-10 NOTE — ED ADULT NURSE NOTE - OBJECTIVE STATEMENT
C/O allergic reaction after treating self for tooth w/ clindamycin . took prednisone and benadryl with effect.

## 2020-09-10 NOTE — ED PROVIDER NOTE - NSFOLLOWUPINSTRUCTIONS_ED_ALL_ED_FT
Take Benadryl 25-50 mg every 6 hours as needed.  Please  your prescriptions at your preferred pharmacy that you provided today and take as directed. (Epi Pen and prednisone).       Allergic Reaction    An allergic reaction is an abnormal reaction to a substance (allergen) by the body's defense system. Common allergens include medicines, food, insect bites or stings, and blood products. The body releases certain proteins into the blood that can cause a variety of symptoms such as an itchy rash, wheezing, swelling of the face/lips/tongue/throat, abdominal pain, nausea or vomiting. An allergic reaction is usually treated with medication. If your health care provider prescribed you an epinephrine injection device, make sure to keep it with you at all times.    SEEK IMMEDIATE MEDICAL CARE IF YOU HAVE ANY OF THE FOLLOWING SYMPTOMS: allergic reaction severe enough that required you to use epinephrine, tightness in your chest, swelling around your lips/tongue/throat, abdominal pain, vomiting or diarrhea, or lightheadedness/dizziness. These symptoms may represent a serious problem that is an emergency. Do not wait to see if the symptoms will go away. Use your auto-injector pen or anaphylaxis kit as you have been instructed. Call 911 and do not drive yourself to the hospital.

## 2020-09-10 NOTE — ED PROVIDER NOTE - PHYSICAL EXAMINATION
VITALS: reviewed  GEN: NAD, A & O x 4  HEAD/EYES: NCAT, PERRL, EOMI, anicteric sclerae, no conjunctival pallor. Symmetrical periorbital edema.  ENT: mucus membranes moist, oropharynx WNL, trachea midline, no JVD  RESP: lungs CTA with equal breath sounds bilaterally, chest wall nontender and atraumatic  CV: heart with reg rhythm S1, S2, no murmur; distal pulses intact and symmetric bilaterally  ABDOMEN: normoactive bowel sounds, soft, nondistended, nontender, no palpable masses  : no CVAT  MSK: extremities atraumatic and nontender, no edema, no asymmetry. the back is without midline or lateral tenderness, there is no spinal deformity or stepoff and the back is ranged painlessly. the neck has no midline tenderness, deformity, or stepoff, and is ranged painlessly.  SKIN: warm, dry, no rash, no bruising, no cyanosis. color appropriate for ethnicity  NEURO: alert, mentating appropriately, no facial asymmetry. gross sensation, motor, coordination are intact  PSYCH: Affect appropriate

## 2020-09-10 NOTE — ED PROVIDER NOTE - OBJECTIVE STATEMENT
Telephone Encounter by Davidson Corrigan at 10/10/17 04:34 PM     Author:  Davidson Corrigan Service:  (none) Author Type:  Medical Assistant     Filed:  10/10/17 04:34 PM Encounter Date:  10/10/2017 Status:  Signed     :  Davidson Corrigan (Medical Assistant)            To Brittanie Gonzales. Pt denies any symptoms at this time,  Asking to by pass OV? Last Colonoscopy in 2012. [ER1.1M]  Electronically Signed by:    Davidson Corrigan , 10/10/2017[ER1.1T]        Revision History        User Key Date/Time User Provider Type Action    > ER1.1 10/10/17 04:34 PM Davidson Corrigan Medical Assistant Sign    M - Manual, T - Template 44 y/o F with a PMHx of Anemia and HTN presents to the ED c/o allergic reaction s/p taking clindamycin at 7:15 pm today. Patient states that she had a tooth ache a few weeks ago and was prescribed clindamycin by her dentist. Patient reports taking the clindamycin for a course of 4 days and did not have any issues until the fourth day when she developed eye swelling, sneezing, a scratching sensation in her throat and tingling in her hands. Patient states she took benadryl and 1 prednisone at that time and symptoms was resolved within a few minutes. Today, Patient took clindamycin again for her tooth ache and the symptoms started again. Today, Patient notes taking 100mg of benadryl and 1 prednisone with no relief. Denies tongue swelling or wheezing. 44 y/o F with a PMHx of Anemia and HTN presents to the ED c/o allergic reaction s/p taking clindamycin and Motrin at 6:15 pm today for tooth pain. Today, Patient took clindamycin again for her tooth ache and the symptoms started again. Today, Patient notes taking 100mg of benadryl and 1 prednisone with no relief. Denies lip, mouth, or tongue swelling, no sensation of throat closing, no wheezing. Patient states that she similar symptoms a few weeks ago after taking Clindamycin. She had been taking Clindamycin for a tooth ache and developed symptoms on her 4th day when she developed eye swelling, sneezing, a scratching sensation in her throat and tingling in her hands. Patient states she took benadryl and 1 prednisone at that time and symptoms was resolved within a few minutes. States she has taken Motrin intermittently since that time without reaction.

## 2020-09-10 NOTE — ED PROVIDER NOTE - PATIENT PORTAL LINK FT
You can access the FollowMyHealth Patient Portal offered by [facility] by registering at the following website: [email or web address]. By joining [employer]’s FollowMyHealth portal, you will also be able to view your health information using other applications (apps) compatible with our system.

## 2020-09-10 NOTE — ED ADULT TRIAGE NOTE - CHIEF COMPLAINT QUOTE
self medicated with clindamycin for took ache, gat allergy reaction , swollen eyes , denies sob, feels better after taking benadryl 100mg and prednisone po.

## 2020-09-10 NOTE — ED PROVIDER NOTE - NSFOLLOWUPCLINICS_GEN_ALL_ED_FT
[facility]  Allergy  [address] [place] [address]  Phone: [phone]  Fax:   Follow Up Time:

## 2020-09-10 NOTE — ED PROVIDER NOTE - NS ED ROS FT
CONST: no fevers, no chills, no trauma  EYES: no pain, no visual disturbances, +bilateral eye swelling  ENT: no sore throat, no epistaxis, no rhinorrhea, no hearing changes  CV: no chest pain, no palpitations, no orthopnea, no extremity pain or swelling  RESP: no shortness of breath, no cough, no sputum, no pleurisy, no wheezing  ABD: no abdominal pain, no nausea, no vomiting, no diarrhea, no black or bloody stool  : no dysuria, no hematuria, no frequency, no urgency  MSK: no back pain, no neck pain, no extremity pain  NEURO: no headache, no sensory disturbances, no focal weakness, no dizziness  HEME: no easy bleeding or bruising  SKIN: no diaphoresis, no rash

## 2020-09-10 NOTE — ED PROVIDER NOTE - NS_ ATTENDINGSCRIBEDETAILS _ED_A_ED_FT
The scribe's documentation has been prepared under my direction and personally reviewed and edited by me in its entirety. I confirm that the note above accurately reflects all work, treatment, procedures, and medical decision making performed by me.

## 2020-09-10 NOTE — ED PROVIDER NOTE - PROGRESS NOTE DETAILS
Srinathack: pt reassessed, no lip/tongue swelling, no itchy throat or sensation of throat closing, no wheezing, no increase in periorbital swelling. Epi pen sent to pharmacy along with prednisone. dc with return recs

## 2020-09-14 DIAGNOSIS — I10 ESSENTIAL (PRIMARY) HYPERTENSION: ICD-10-CM

## 2020-09-14 DIAGNOSIS — D64.9 ANEMIA, UNSPECIFIED: ICD-10-CM

## 2020-09-14 DIAGNOSIS — T78.40XA ALLERGY, UNSPECIFIED, INITIAL ENCOUNTER: ICD-10-CM

## 2020-10-05 NOTE — ED ADULT TRIAGE NOTE - BSA (M2)
1.84 Complex Repair And Flap Additional Text (Will Appearing After The Standard Complex Repair Text): The complex repair was not sufficient to completely close the primary defect. The remaining additional defect was repaired with the flap mentioned below.

## 2021-09-28 NOTE — ED ADULT NURSE NOTE - CHPI ED NUR SYMPTOMS NEG
no fever/no tingling/no pain/no chills/no decreased eating/drinking/no vomiting/no weakness/no nausea
No

## 2022-06-06 ENCOUNTER — EMERGENCY (EMERGENCY)
Facility: HOSPITAL | Age: 47
LOS: 1 days | Discharge: HOME | End: 2022-06-06
Payer: COMMERCIAL

## 2022-06-06 VITALS
RESPIRATION RATE: 19 BRPM | DIASTOLIC BLOOD PRESSURE: 106 MMHG | WEIGHT: 175.93 LBS | TEMPERATURE: 98 F | OXYGEN SATURATION: 99 % | SYSTOLIC BLOOD PRESSURE: 187 MMHG | HEIGHT: 65 IN | HEART RATE: 93 BPM

## 2022-06-06 VITALS
OXYGEN SATURATION: 100 % | HEART RATE: 88 BPM | DIASTOLIC BLOOD PRESSURE: 95 MMHG | TEMPERATURE: 98 F | SYSTOLIC BLOOD PRESSURE: 164 MMHG | RESPIRATION RATE: 18 BRPM

## 2022-06-06 DIAGNOSIS — Z98.89 OTHER SPECIFIED POSTPROCEDURAL STATES: Chronic | ICD-10-CM

## 2022-06-06 PROCEDURE — 70450 CT HEAD/BRAIN W/O DYE: CPT | Mod: 26,MA

## 2022-06-06 PROCEDURE — 70450 CT HEAD/BRAIN W/O DYE: CPT | Mod: MA

## 2022-06-06 PROCEDURE — 99284 EMERGENCY DEPT VISIT MOD MDM: CPT | Mod: 25

## 2022-06-06 PROCEDURE — 99284 EMERGENCY DEPT VISIT MOD MDM: CPT

## 2022-06-06 NOTE — ED ADULT NURSE NOTE - OBJECTIVE STATEMENT
46 year old female coming in for a headache. PMH of HTN. Patient states about an hour ago she noticed a headache. she states the head pain comes and goes. She also noticed a "rushing" feeling in her head. Patient denies dizziness, no vision changes, no change in gait, or speech. No shortness of breath or difficulty breathing. No chest pain, pressure or palpitations. No abdominal pain, nausea or vomiting. No fever, chills, or body aches. Patient is fidgeting in the stretcher and unable to sit still, also unable to make direct eye contact. Patient denies alcohol, Tobacco, or drug use. Patient currently denies any symptoms.

## 2022-06-06 NOTE — ED PROVIDER NOTE - OBJECTIVE STATEMENT
47yo F w/ history of HTN coming in w/ headache that began overnight. Patient was watching tv and had intermittent sharp frontal headache. Has been intermittent since; currently asymptomatic. Denies any trauma, changes in vision, nausea, vomiting, difficulty walking, weakness or difficulty speaking.

## 2022-06-06 NOTE — ED PROVIDER NOTE - PATIENT PORTAL LINK FT
You can access the FollowMyHealth Patient Portal offered by [facility] by registering at the following website: [email or web address]. By joining [employer]’s FollowMyHealth portal, you will also be able to view your health information using other applications (apps) compatible with our system.

## 2022-06-06 NOTE — ED PROVIDER NOTE - NSFOLLOWUPINSTRUCTIONS_ED_ALL_ED_FT
Discharge instructions:    - Please follow up with your Primary Care Doctor within the next 3-5 days.     - For persistent pain, we recommend you follow up with a neurologist.     - Tylenol up to 650 mg every 4-6 hours as needed for pain and/or Motrin up to 600 mg every 6 hours as needed for pain. Take any prescribed medications as instructed:         - Be sure to return to the ED if you develop new or worsening symptoms. Specific signs and symptoms to be vigilant of: fever or chills, chest pain, difficulty breathing, palpitations, loss of consciousness, headache, vision changes, slurred speech, difficulty swallowing or drooling, facial droop, weakness in the arms or legs, numbness or tingling, abdominal pain, nausea or vomiting, diarrhea, constipation, blood in the stool or urine, pain on urination, difficulty urinating.      SEEK IMMEDIATE MEDICAL CARE IF YOU HAVE ANY OF THE FOLLOWING SYMPTOMS: fever, vomiting, stiff neck, loss of vision, problems with speech, muscle weakness, loss of balance, trouble walking, passing out, or confusion.

## 2022-06-06 NOTE — ED PROVIDER NOTE - ATTENDING CONTRIBUTION TO CARE
MD العلي:  patient seen and evaluated with the resident.  I was present for key portions of the History & Physical, and I agree with the Impression & Plan.  MD العلي:  HA < 6  hrs onset  Associated Sx:  No photophobia/phonophobia, no neck pain, no weakness/numbness, no ataxia, & no fever/chills.  Describes whooshing sensation in head.   Context:  multiple CVAs in family.  VS: wnl.  Physical Exam: adult F, well appearing, NCAT, PERRL, EOMI, Neck - supple, CTA B, RRR, Abd: s/nd/nt, Ext: no edema.  Neuro:  AAOx3, ambulates w/o diff, CN2-12 intact, Gait - normal, normal finger-to-nose, normal heel-to-toe.    Impression:  Headache < 6hrs during, with acute onset.  Within time window where non-contrast CT head is a sensitive test for r/o SAH.    Plan: CT head, reassess.

## 2022-06-06 NOTE — ED PROVIDER NOTE - NSFOLLOWUPCLINICS_GEN_ALL_ED_FT
[facility]  Neurology  [address] [place] [address] [address] [place] [address]  Phone: [phone]  Fax:

## 2022-06-06 NOTE — ED PROVIDER NOTE - PHYSICAL EXAMINATION
GENERAL: well appearing in no acute distress, non-toxic appearing  HEAD: normocephalic, atraumatic  HENT: airway intact, neck supple  EYES: normal conjunctiva, EOMI, PERRL  CARDIAC: regular rate and rhythm, normal S1S2, no appreciable murmurs, 2+ pulses in UE/LE b/l  PULM: normal breath sounds, clear to ascultation bilaterally, no rales, rhonchi, wheezing  GI: abdomen nondistended, soft, nontender, no guarding, rebound tenderness  NEURO: no focal motor or sensory deficits, CN2-12 intact, normal speech, normal finger-to-nose, normal gait, AAOx3  MSK: no peripheral edema,  SKIN: well-perfused, extremities warm, no visible rashes

## 2022-06-06 NOTE — ED PROVIDER NOTE - CLINICAL SUMMARY MEDICAL DECISION MAKING FREE TEXT BOX
47yo F w/ history of HTN coming in w/ headache; currently asymptomatic. No neurologic deficits appreciated. No clinical concerning signs for intracranial bleeding. Will likely d/c with outpatient neuro

## 2022-06-06 NOTE — ED PROVIDER NOTE - NS ED ROS FT
General: denies fever, chills  HENT: denies nasal congestion, rhinorrhea  Eyes: denies visual changes, blurred vision  CV: denies chest pain, palpitations  Resp: denies difficulty breathing, cough  Abdominal: denies nausea, vomiting, diarrhea, abdominal pain  : denies urinary pain or discharge  MSK: denies muscle aches, leg swelling  Neuro: headache  Skin: denies rashes, bruises

## 2023-03-09 ENCOUNTER — INPATIENT (INPATIENT)
Facility: HOSPITAL | Age: 48
LOS: 0 days | Discharge: HOME | DRG: 66 | End: 2023-03-10
Attending: STUDENT IN AN ORGANIZED HEALTH CARE EDUCATION/TRAINING PROGRAM | Admitting: INTERNAL MEDICINE
Payer: COMMERCIAL

## 2023-03-09 VITALS
OXYGEN SATURATION: 92 % | HEIGHT: 64 IN | WEIGHT: 179.9 LBS | SYSTOLIC BLOOD PRESSURE: 238 MMHG | HEART RATE: 74 BPM | DIASTOLIC BLOOD PRESSURE: 140 MMHG | RESPIRATION RATE: 18 BRPM

## 2023-03-09 DIAGNOSIS — G45.9 TRANSIENT CEREBRAL ISCHEMIC ATTACK, UNSPECIFIED: ICD-10-CM

## 2023-03-09 DIAGNOSIS — Z98.89 OTHER SPECIFIED POSTPROCEDURAL STATES: Chronic | ICD-10-CM

## 2023-03-09 LAB
ALBUMIN SERPL ELPH-MCNC: 3.3 G/DL — SIGNIFICANT CHANGE UP (ref 3.3–5)
ALP SERPL-CCNC: 57 U/L — SIGNIFICANT CHANGE UP (ref 40–120)
ALT FLD-CCNC: 15 U/L — SIGNIFICANT CHANGE UP (ref 12–78)
ANION GAP SERPL CALC-SCNC: 2 MMOL/L — LOW (ref 5–17)
APTT BLD: 26.3 SEC — LOW (ref 27.5–35.5)
AST SERPL-CCNC: 31 U/L — SIGNIFICANT CHANGE UP (ref 15–37)
BASOPHILS # BLD AUTO: 0.09 K/UL — SIGNIFICANT CHANGE UP (ref 0–0.2)
BASOPHILS NFR BLD AUTO: 0.9 % — SIGNIFICANT CHANGE UP (ref 0–2)
BILIRUB SERPL-MCNC: 0.3 MG/DL — SIGNIFICANT CHANGE UP (ref 0.2–1.2)
BUN SERPL-MCNC: 15 MG/DL — SIGNIFICANT CHANGE UP (ref 7–23)
CALCIUM SERPL-MCNC: 8.8 MG/DL — SIGNIFICANT CHANGE UP (ref 8.5–10.1)
CHLORIDE SERPL-SCNC: 103 MMOL/L — SIGNIFICANT CHANGE UP (ref 96–108)
CO2 SERPL-SCNC: 29 MMOL/L — SIGNIFICANT CHANGE UP (ref 22–31)
CREAT SERPL-MCNC: 0.98 MG/DL — SIGNIFICANT CHANGE UP (ref 0.5–1.3)
EGFR: 72 ML/MIN/1.73M2 — SIGNIFICANT CHANGE UP
EOSINOPHIL # BLD AUTO: 0.34 K/UL — SIGNIFICANT CHANGE UP (ref 0–0.5)
EOSINOPHIL NFR BLD AUTO: 3.4 % — SIGNIFICANT CHANGE UP (ref 0–6)
FLUAV AG NPH QL: SIGNIFICANT CHANGE UP
FLUBV AG NPH QL: SIGNIFICANT CHANGE UP
GLUCOSE SERPL-MCNC: 85 MG/DL — SIGNIFICANT CHANGE UP (ref 70–99)
HCT VFR BLD CALC: 31.7 % — LOW (ref 34.5–45)
HGB BLD-MCNC: 9.9 G/DL — LOW (ref 11.5–15.5)
HYPOCHROMIA BLD QL: SLIGHT — SIGNIFICANT CHANGE UP
IMM GRANULOCYTES NFR BLD AUTO: 0.4 % — SIGNIFICANT CHANGE UP (ref 0–0.9)
INR BLD: 1.09 RATIO — SIGNIFICANT CHANGE UP (ref 0.88–1.16)
LYMPHOCYTES # BLD AUTO: 1.83 K/UL — SIGNIFICANT CHANGE UP (ref 1–3.3)
LYMPHOCYTES # BLD AUTO: 18.2 % — SIGNIFICANT CHANGE UP (ref 13–44)
MANUAL SMEAR VERIFICATION: SIGNIFICANT CHANGE UP
MCHC RBC-ENTMCNC: 23.2 PG — LOW (ref 27–34)
MCHC RBC-ENTMCNC: 31.2 GM/DL — LOW (ref 32–36)
MCV RBC AUTO: 74.2 FL — LOW (ref 80–100)
MICROCYTES BLD QL: SIGNIFICANT CHANGE UP
MONOCYTES # BLD AUTO: 0.84 K/UL — SIGNIFICANT CHANGE UP (ref 0–0.9)
MONOCYTES NFR BLD AUTO: 8.3 % — SIGNIFICANT CHANGE UP (ref 2–14)
NEUTROPHILS # BLD AUTO: 6.92 K/UL — SIGNIFICANT CHANGE UP (ref 1.8–7.4)
NEUTROPHILS NFR BLD AUTO: 68.8 % — SIGNIFICANT CHANGE UP (ref 43–77)
OVALOCYTES BLD QL SMEAR: SLIGHT — SIGNIFICANT CHANGE UP
PLAT MORPH BLD: NORMAL — SIGNIFICANT CHANGE UP
PLATELET # BLD AUTO: 417 K/UL — HIGH (ref 150–400)
POTASSIUM SERPL-MCNC: 4 MMOL/L — SIGNIFICANT CHANGE UP (ref 3.5–5.3)
POTASSIUM SERPL-SCNC: 4 MMOL/L — SIGNIFICANT CHANGE UP (ref 3.5–5.3)
PROT SERPL-MCNC: 8.1 GM/DL — SIGNIFICANT CHANGE UP (ref 6–8.3)
PROTHROM AB SERPL-ACNC: 12.6 SEC — SIGNIFICANT CHANGE UP (ref 10.5–13.4)
RBC # BLD: 4.27 M/UL — SIGNIFICANT CHANGE UP (ref 3.8–5.2)
RBC # FLD: 19.4 % — HIGH (ref 10.3–14.5)
RBC BLD AUTO: ABNORMAL
RSV RNA NPH QL NAA+NON-PROBE: SIGNIFICANT CHANGE UP
SARS-COV-2 RNA SPEC QL NAA+PROBE: SIGNIFICANT CHANGE UP
SODIUM SERPL-SCNC: 134 MMOL/L — LOW (ref 135–145)
TROPONIN I, HIGH SENSITIVITY RESULT: 30.17 NG/L — SIGNIFICANT CHANGE UP
WBC # BLD: 10.06 K/UL — SIGNIFICANT CHANGE UP (ref 3.8–10.5)
WBC # FLD AUTO: 10.06 K/UL — SIGNIFICANT CHANGE UP (ref 3.8–10.5)

## 2023-03-09 PROCEDURE — 85027 COMPLETE CBC AUTOMATED: CPT

## 2023-03-09 PROCEDURE — 97162 PT EVAL MOD COMPLEX 30 MIN: CPT | Mod: GP

## 2023-03-09 PROCEDURE — 70498 CT ANGIOGRAPHY NECK: CPT | Mod: 26,MA

## 2023-03-09 PROCEDURE — 71045 X-RAY EXAM CHEST 1 VIEW: CPT | Mod: 26

## 2023-03-09 PROCEDURE — 83540 ASSAY OF IRON: CPT

## 2023-03-09 PROCEDURE — 95816 EEG AWAKE AND DROWSY: CPT

## 2023-03-09 PROCEDURE — 0042T: CPT | Mod: MA

## 2023-03-09 PROCEDURE — 92523 SPEECH SOUND LANG COMPREHEN: CPT | Mod: GN

## 2023-03-09 PROCEDURE — 83036 HEMOGLOBIN GLYCOSYLATED A1C: CPT

## 2023-03-09 PROCEDURE — 99447 NTRPROF PH1/NTRNET/EHR 11-20: CPT

## 2023-03-09 PROCEDURE — 80048 BASIC METABOLIC PNL TOTAL CA: CPT

## 2023-03-09 PROCEDURE — 36415 COLL VENOUS BLD VENIPUNCTURE: CPT

## 2023-03-09 PROCEDURE — 70496 CT ANGIOGRAPHY HEAD: CPT | Mod: 26,MA

## 2023-03-09 PROCEDURE — 80061 LIPID PANEL: CPT

## 2023-03-09 PROCEDURE — 70551 MRI BRAIN STEM W/O DYE: CPT

## 2023-03-09 PROCEDURE — 93010 ELECTROCARDIOGRAM REPORT: CPT

## 2023-03-09 PROCEDURE — 99291 CRITICAL CARE FIRST HOUR: CPT

## 2023-03-09 PROCEDURE — 93306 TTE W/DOPPLER COMPLETE: CPT

## 2023-03-09 PROCEDURE — 83550 IRON BINDING TEST: CPT

## 2023-03-09 RX ORDER — ASPIRIN/CALCIUM CARB/MAGNESIUM 324 MG
324 TABLET ORAL ONCE
Refills: 0 | Status: COMPLETED | OUTPATIENT
Start: 2023-03-09 | End: 2023-03-09

## 2023-03-09 RX ADMIN — Medication 324 MILLIGRAM(S): at 19:49

## 2023-03-09 NOTE — ED ADULT NURSE NOTE - PAIN: PRESENCE, MLM
Follow-up of hypertension.  Good control.  Cardiac pacemaker follow-up current.  Sees Cardiology again in May.  Hyperlipidemia.  Abdominal aortic aneurysm stable.  Current.  Has macro albuminuria.  Diabetes mellitus type 2 with polyneuropathy.  And nephropathy.  Also has some retinopathy.  Follow-up of this is due in April.  Has hyperuricemia no current gout issues.  Using aspirin and Plavix.  Has CKD 3A.  Anemia due to the chronic kidney disease also.  Cholesterol is 122 with an HDL of 45 and LDL of 52 uric acid 4.9 good.  A1c 6.4 up from 6.1 fasting sugar 118 GFR is 52 hemoglobin 12.6.  Prostate cancer follows up with  in each min.    Physical examination vital signs are noted.  No acute distress.  Neck without bruit.  Chest clear.  Heart regular rate rhythm.  Abdomen bowel sounds are positive soft and nontender.  Extremities are without edema.  Positive pedal pulses.  Feet are without any lesions.  No calluses skin breakdown or ulcerations.  Sensation present 5 of 5 spots tested each foot.  Position sense vibratory sensation intact.  Pulses are 2+.    Impression.  Hyper tension.  Controlled.  Cardiac pacemaker.  Hyperlipidemia good control.  Abdominal aortic aneurysm which is stable.  Macro albuminuria.  Diabetes mellitus type 2 with polyneuropathy.  Diabetes mellitus type 2 with nephropathy.  Diabetes mellitus type 2 with retinopathy.  Hyperuricemia.  Aspirin use.  Plavix use.  CKD 3A.  Anemia secondary to chronic kidney disease.  Ectropion.  Carcinoma of the prostate.    Plan refill potassium chloride 10 mEq dial up rim 100 mg Cozaar 50 mg Pravachol 20 mg fenofibrate 160 mg Plavix Antivert 25 mg q.i.d. p.r.n..  Also has ectropion.  Needs neomycin polymyxin B dexamethasone eye ointment for this.  Continue to follow-up with Dr. Arce.       denies pain/discomfort (Rating = 0)

## 2023-03-09 NOTE — ED ADULT NURSE NOTE - OBJECTIVE STATEMENT
Pt presents to ED for stroke eval s/p several episodes of slurred speech, R sided facial droop, and feeling unbalanced. Symptoms have since resolved since arriving to ED, NIH of 0 at this time, pt has no complaints. Pt reports hx of htn. Pt is aa&ox4, labs drawn, MD already assessed pt

## 2023-03-09 NOTE — PHARMACOTHERAPY INTERVENTION NOTE - COMMENTS
Medication history complete, reviewed medications with patient and confirmed with doctor first med hx.

## 2023-03-09 NOTE — ED PROVIDER NOTE - PHYSICAL EXAMINATION
Constitutional: NAD AAOx3  Eyes: PERRLA EOMI  Head: Normocephalic atraumatic  Mouth: MMM  Cardiac: regular rate   Resp: Lungs CTAB  GI: Abd s/nt/nd, no rebound or guarding.  Neuro: awake, alert, moving all extremities, cranial nerves 2-12 intact, sensation intact, no dysmetria. See NIH scale.   Skin: No rashes

## 2023-03-09 NOTE — CHART NOTE - NSCHARTNOTEFT_GEN_A_CORE
HISTORY OF PRESENT ILLNESS:    47F w/ PMH of HTN, presents w/ speech difficultes and balance issues beginning at 1am last night. Episdoes have been on-off consisting of feeling off balance, difficulty getting words out, and slurred speech. She had returned       NIHSS:   LKN:   Pre-stroke MRS:   CTH:   CTA h/n:   CTP:     HOME MEDICATIONS:  Home Medications:  Diovan HCT 12.5 mg-160 mg oral tablet: 1 tab(s) orally once a day (05 Feb 2018 12:55)      SMOKING/ETOH/RECREATIONAL DRUGS:    VITALS/DATA/ORDERS: [x] Reviewed    Labs:  CAPILLARY BLOOD GLUCOSE        Platelet Count - Automated: 417 K/uL (03-09-23 @ 18:46)    PT/INR - ( 09 Mar 2023 18:46 )   PT: 12.6 sec;   INR: 1.09 ratio         PTT - ( 09 Mar 2023 18:46 )  PTT:26.3 sec  CT Head:     IV tenecteplase CONTRAINDICATIONS  [] None    ABSOLUTE EXCLUSION CRITERIA:  [] Patient outside of the appropriate time window for IV tenecteplase  [] Evidence of intracranial hemorrhage on pretreatment CT scan (CT must be read by an attending radiologist or attending neurologist)  [] Head CT findings suggesting an established acute cerebral infarction as evidenced by coy hypodensity, regardless of size.  [] Clinical presentation consistent with subarachnoid hemorrhage, even with normal CT scan  [] Active internal bleeding  [] Known bleeding diathesis (including but not limited to platelet count < 100,000, use of oral anticoagulants with INR>1.7, use of full dose low molecular       weight heparin within the last 24 hours, use of unfractionated heparin AND a prolonged PTT (> 40sec), use of direct thrombin inhibitor (e.g. dabigatran) or oral direct Factor Xa inhibitor (e.g. rivaroxiban, apixaban) within 48 hours)  [] Systolic pressure >= 185 mmHg or diastolic pressure 110 mmHg on repeated measurements at the time treatment is to begin  [] Care team unable to determine eligibility for IV alteplase  [] Patient, family, or surrogate declines and understands risks and benefits of treatment.  [] For wake-up Protocol patients: DW-MRI lesions larger than one-third of the MCA territory    RELATIVE EXCLUSION CRITERIA    [] Isolated neurological deficits (except for aphasia or hemianopsia)  [] stroke severity too mild (non-disabling)  [] Seizure at onset with post-ictal residual neurological impairment  [] Gastrointestinal, genitourinary or respiratory hemorrhage within 21 days   [] Major surgery within 14 days   [] Blood glucose level < 50 or > 400 mG/dL  [] CPR with chest compressions or minor surgery (including liver and kidney biopsy, thoracocentesis, lumbar puncture) within 10 days   [] Arterial puncture at non-compressible site within 7 days   [] Evidence of acute trauma (fracture)   [] Diabetic hemorrhagic retinopathy or ophthalmic bleeding  [] Pregnancy or peripartum; no nursing post- treatment  [] Post-myocardial infarction pericarditis  [] Peritoneal dialysis or hemodialysis or sever hepatic disease   [] Known bacterial endocarditis   [] Life expectancy less than 6 months or sever co-morbid illness   [] Known cerebral vascular malformation, untreated intracranial aneurysm or brain tumor (may consider IV alteplase in patients with CNS lesions that have a very low likelihood of hemorrhage, such as small un-ruptured aneurysms or benign tumors with low vascularity.  [] Social/Lutheran reason  [] Other ____________________      RISKS/BENEFITS DISCUSSION:  The risks and benefits of IV tenecteplase administration were discussed with patient/family in presence of OSH staff. Current treatment recommendations for eligible patients with acute ischemic stroke have proven highly beneficial with acceptable risk. Complications related to intravenous tenecteplase include symptomatic intracranial hemorrhage, major systemic hemorrhage and angioedema. Despite the risks of bleeding, studies have consistently shown that the benefits of tenecteplase outweigh the risks. Patient/family understands and consents to treatment with thrombolysis with IV tenecteplase.       ASSESSMENT:       Consultation provided via live audio with ED staff. HISTORY OF PRESENT ILLNESS:    47F w/ PMH of HTN, presents w/ speech difficultes and balance issues beginning at 1am last night. Episdoes have been on-off consisting of feeling off balance, difficulty getting words out, and slurred speech. She had returned to baseline up untill 515pm today when she suddenly developed similar symptoms again that resolved upon arrival to the ER.     NIHSS: 0   LKN: 515pm today   Pre-stroke MRS: 0  CTH: neg  CTA h/n: R. A2 stenosis otherwise no stenooclusive disease  CTP: neg    HOME MEDICATIONS:  Home Medications:  Diovan HCT 12.5 mg-160 mg oral tablet: 1 tab(s) orally once a day (05 Feb 2018 12:55)      SMOKING/ETOH/RECREATIONAL DRUGS:    VITALS/DATA/ORDERS: [x] Reviewed    Labs:  CAPILLARY BLOOD GLUCOSE        Platelet Count - Automated: 417 K/uL (03-09-23 @ 18:46)    PT/INR - ( 09 Mar 2023 18:46 )   PT: 12.6 sec;   INR: 1.09 ratio         PTT - ( 09 Mar 2023 18:46 )  PTT:26.3 sec  CT Head:     IV tenecteplase CONTRAINDICATIONS  [] None    ABSOLUTE EXCLUSION CRITERIA:  Symptoms resolved     ASSESSMENT:     47F w/ PMH of HTN, presents w/ speech difficultes and balance issues beginning at 1am last night. Episdoes have been on-off consisting of feeling off balance, difficulty getting words out, and slurred speech. She had returned to baseline up untill 515pm today when she suddenly developed similar symptoms again that resolved upon arrival to the ER.     NIHSS: 0   LKN: 515pm today   Pre-stroke MRS: 0  CTH: neg  CTA h/n: R. A2 stenosis otherwise no stenooclusive disease  CTP: neg    Imp: Acute onset dizziness, slurred speech possibly secondary to posterior fossa dysfunction; Etiology possibly transient ischemic attack.     Rec Load plavix 300mg; Followed by ASA 81mg + Plavix 75mg for 3 weeks followed by ASA 81mg alone indefinitely     Consultation provided via live audio with ED staff. HISTORY OF PRESENT ILLNESS:    47F w/ PMH of HTN, presents w/ speech difficultes and balance issues beginning at 1am last night. Episdoes have been on-off consisting of feeling off balance, difficulty getting words out, and slurred speech. She had returned to baseline up untill 515pm today when she suddenly developed similar symptoms again that resolved upon arrival to the ER.     NIHSS: 0   LKN: 515pm today   Pre-stroke MRS: 0  CTH: neg  CTA h/n: R. A2 stenosis otherwise no stenooclusive disease  CTP: neg    HOME MEDICATIONS:  Home Medications:  Diovan HCT 12.5 mg-160 mg oral tablet: 1 tab(s) orally once a day (05 Feb 2018 12:55)      SMOKING/ETOH/RECREATIONAL DRUGS:    VITALS/DATA/ORDERS: [x] Reviewed    Labs:  CAPILLARY BLOOD GLUCOSE        Platelet Count - Automated: 417 K/uL (03-09-23 @ 18:46)    PT/INR - ( 09 Mar 2023 18:46 )   PT: 12.6 sec;   INR: 1.09 ratio         PTT - ( 09 Mar 2023 18:46 )  PTT:26.3 sec  CT Head:     IV tenecteplase CONTRAINDICATIONS  [] None    ABSOLUTE EXCLUSION CRITERIA:  Symptoms resolved     ASSESSMENT:     47F w/ PMH of HTN, presents w/ speech difficultes and balance issues beginning at 1am last night. Episdoes have been on-off consisting of feeling off balance, difficulty getting words out, and slurred speech. She had returned to baseline up untill 515pm today when she suddenly developed similar symptoms again that resolved upon arrival to the ER.     NIHSS: 0   LKN: 515pm today   Pre-stroke MRS: 0  CTH: neg  CTA h/n: R. A2 stenosis otherwise no stenooclusive disease  CTP: neg    Imp: Acute onset dizziness, slurred speech possibly secondary to posterior fossa dysfunction; Etiology possibly transient ischemic attack.     No TNK as symptoms resolved  No MT as no LVO     Rec Load plavix 300mg; Followed by ASA 81mg + Plavix 75mg for 3 weeks followed by ASA 81mg alone indefinitely     Consultation provided via live audio with ED staff. HISTORY OF PRESENT ILLNESS:    47F w/ PMH of HTN, presents w/ speech difficultes and balance issues beginning at 1am last night. Episdoes have been on-off consisting of feeling off balance, difficulty getting words out, and slurred speech. She had returned to baseline up untill 515pm today when she suddenly developed similar symptoms again that resolved upon arrival to the ER.     NIHSS: 0   LKN: 515pm today   Pre-stroke MRS: 0  CTH: neg  CTA h/n: R. A2 stenosis otherwise no stenooclusive disease  CTP: neg    HOME MEDICATIONS:  Home Medications:  Diovan HCT 12.5 mg-160 mg oral tablet: 1 tab(s) orally once a day (05 Feb 2018 12:55)      SMOKING/ETOH/RECREATIONAL DRUGS:    VITALS/DATA/ORDERS: [x] Reviewed    Labs:  CAPILLARY BLOOD GLUCOSE        Platelet Count - Automated: 417 K/uL (03-09-23 @ 18:46)    PT/INR - ( 09 Mar 2023 18:46 )   PT: 12.6 sec;   INR: 1.09 ratio         PTT - ( 09 Mar 2023 18:46 )  PTT:26.3 sec  CT Head:     IV tenecteplase CONTRAINDICATIONS  [] None    ABSOLUTE EXCLUSION CRITERIA:  Symptoms resolved     ASSESSMENT:     47F w/ PMH of HTN, presents w/ speech difficultes and balance issues beginning at 1am last night. Episdoes have been on-off consisting of feeling off balance, difficulty getting words out, and slurred speech. She had returned to baseline up untill 515pm today when she suddenly developed similar symptoms again that resolved upon arrival to the ER.     NIHSS: 0   LKN: 515pm today   Pre-stroke MRS: 0  CTH: neg  CTA h/n: R. A2 stenosis otherwise no stenooclusive disease  CTP: neg    Imp: Acute onset dizziness, slurred speech possibly secondary to posterior fossa dysfunction; Etiology possibly transient ischemic attack.     No TNK as symptoms resolved  No MT as no LVO     Rec Load plavix 300mg; Followed by ASA 81mg + Plavix 75mg for 3 weeks followed by ASA 81mg alone indefinitely     Consultation provided via live audio with ED staff.    Attending attestation:   Clinical presentation and imaging results reviewed and agree with plan outlined by Dr. Meier

## 2023-03-09 NOTE — ED PROVIDER NOTE - NS ED ROS FT
Constitutional: No fever or chills  Eyes: No visual changes  HEENT: No throat pain  CV: No chest pain  Resp: No SOB no cough  GI: No abd pain, nausea or vomiting  : No dysuria  MSK: No musculoskeletal pain  Skin: No rash  Neuro: No headache. +slurred speech, +unsteady gait, +difficulty speaking

## 2023-03-09 NOTE — ED ADULT TRIAGE NOTE - CHIEF COMPLAINT QUOTE
pt BIBEMS c/o slurred speech today at 1630. pt states last night she felt like her balance was off, and slurred speech which resolved. today, pt experienced same symptoms at work, witnessed by co-worker 1 hour PTA. since then, symptoms have resolved. pt a&ox4, ROM in all extremities, BEFAST negative. MD Gomez called to triage, code stroke called 1815. pt brought directly to CT scan. pt hypertensive, given 5mg Metoprolol PTA.

## 2023-03-09 NOTE — ED PROVIDER NOTE - CLINICAL SUMMARY MEDICAL DECISION MAKING FREE TEXT BOX
46 y/o female with a PMHx of HTN, borderline HLD presents to the ED with crescendo/decrescendo TIA since last night. Code stroke called, will obtain CT imaging and reassessment.

## 2023-03-09 NOTE — ED PROVIDER NOTE - CRITICAL CARE ATTENDING CONTRIBUTION TO CARE
Elma BENJAMIN M.D. independently provided 35 minutes of critical care including but not limited to talking to consultants, speaking with patient and family and reviewing lab and radiology results.

## 2023-03-10 ENCOUNTER — TRANSCRIPTION ENCOUNTER (OUTPATIENT)
Age: 48
End: 2023-03-10

## 2023-03-10 VITALS
OXYGEN SATURATION: 99 % | SYSTOLIC BLOOD PRESSURE: 139 MMHG | DIASTOLIC BLOOD PRESSURE: 92 MMHG | HEART RATE: 59 BPM | TEMPERATURE: 98 F | RESPIRATION RATE: 18 BRPM

## 2023-03-10 DIAGNOSIS — I63.9 CEREBRAL INFARCTION, UNSPECIFIED: ICD-10-CM

## 2023-03-10 LAB
A1C WITH ESTIMATED AVERAGE GLUCOSE RESULT: 5.8 % — HIGH (ref 4–5.6)
ANION GAP SERPL CALC-SCNC: 6 MMOL/L — SIGNIFICANT CHANGE UP (ref 5–17)
BUN SERPL-MCNC: 16 MG/DL — SIGNIFICANT CHANGE UP (ref 7–23)
CALCIUM SERPL-MCNC: 9.1 MG/DL — SIGNIFICANT CHANGE UP (ref 8.5–10.1)
CHLORIDE SERPL-SCNC: 107 MMOL/L — SIGNIFICANT CHANGE UP (ref 96–108)
CHOLEST SERPL-MCNC: 260 MG/DL — HIGH
CO2 SERPL-SCNC: 29 MMOL/L — SIGNIFICANT CHANGE UP (ref 22–31)
CREAT SERPL-MCNC: 0.98 MG/DL — SIGNIFICANT CHANGE UP (ref 0.5–1.3)
EGFR: 72 ML/MIN/1.73M2 — SIGNIFICANT CHANGE UP
ESTIMATED AVERAGE GLUCOSE: 120 MG/DL — HIGH (ref 68–114)
GLUCOSE SERPL-MCNC: 100 MG/DL — HIGH (ref 70–99)
HCT VFR BLD CALC: 32.3 % — LOW (ref 34.5–45)
HDLC SERPL-MCNC: 52 MG/DL — SIGNIFICANT CHANGE UP
HGB BLD-MCNC: 10 G/DL — LOW (ref 11.5–15.5)
IRON SATN MFR SERPL: 19 UG/DL — LOW (ref 30–160)
IRON SATN MFR SERPL: 4 % — LOW (ref 14–50)
LIPID PNL WITH DIRECT LDL SERPL: 198 MG/DL — HIGH
MCHC RBC-ENTMCNC: 22.8 PG — LOW (ref 27–34)
MCHC RBC-ENTMCNC: 31 GM/DL — LOW (ref 32–36)
MCV RBC AUTO: 73.7 FL — LOW (ref 80–100)
NON HDL CHOLESTEROL: 208 MG/DL — HIGH
PLATELET # BLD AUTO: 467 K/UL — HIGH (ref 150–400)
POTASSIUM SERPL-MCNC: 3 MMOL/L — LOW (ref 3.5–5.3)
POTASSIUM SERPL-SCNC: 3 MMOL/L — LOW (ref 3.5–5.3)
RBC # BLD: 4.38 M/UL — SIGNIFICANT CHANGE UP (ref 3.8–5.2)
RBC # FLD: 19.3 % — HIGH (ref 10.3–14.5)
SODIUM SERPL-SCNC: 142 MMOL/L — SIGNIFICANT CHANGE UP (ref 135–145)
TIBC SERPL-MCNC: 429 UG/DL — SIGNIFICANT CHANGE UP (ref 220–430)
TRIGL SERPL-MCNC: 54 MG/DL — SIGNIFICANT CHANGE UP
UIBC SERPL-MCNC: 411 UG/DL — HIGH (ref 110–370)
WBC # BLD: 9.68 K/UL — SIGNIFICANT CHANGE UP (ref 3.8–10.5)
WBC # FLD AUTO: 9.68 K/UL — SIGNIFICANT CHANGE UP (ref 3.8–10.5)

## 2023-03-10 PROCEDURE — 93306 TTE W/DOPPLER COMPLETE: CPT | Mod: 26

## 2023-03-10 PROCEDURE — 99222 1ST HOSP IP/OBS MODERATE 55: CPT

## 2023-03-10 PROCEDURE — 70551 MRI BRAIN STEM W/O DYE: CPT | Mod: 26

## 2023-03-10 PROCEDURE — 95816 EEG AWAKE AND DROWSY: CPT | Mod: 26

## 2023-03-10 PROCEDURE — 99223 1ST HOSP IP/OBS HIGH 75: CPT

## 2023-03-10 RX ORDER — FERROUS SULFATE 325(65) MG
325 TABLET ORAL DAILY
Refills: 0 | Status: DISCONTINUED | OUTPATIENT
Start: 2023-03-10 | End: 2023-03-10

## 2023-03-10 RX ORDER — CLOPIDOGREL BISULFATE 75 MG/1
300 TABLET, FILM COATED ORAL ONCE
Refills: 0 | Status: COMPLETED | OUTPATIENT
Start: 2023-03-10 | End: 2023-03-10

## 2023-03-10 RX ORDER — VALSARTAN 80 MG/1
160 TABLET ORAL DAILY
Refills: 0 | Status: DISCONTINUED | OUTPATIENT
Start: 2023-03-10 | End: 2023-03-10

## 2023-03-10 RX ORDER — VALSARTAN 80 MG/1
1 TABLET ORAL
Qty: 30 | Refills: 0
Start: 2023-03-10 | End: 2023-04-08

## 2023-03-10 RX ORDER — FERROUS SULFATE 325(65) MG
1 TABLET ORAL
Qty: 30 | Refills: 0
Start: 2023-03-10 | End: 2023-04-08

## 2023-03-10 RX ORDER — CLOPIDOGREL BISULFATE 75 MG/1
1 TABLET, FILM COATED ORAL
Qty: 30 | Refills: 0
Start: 2023-03-10 | End: 2023-04-08

## 2023-03-10 RX ORDER — AMLODIPINE BESYLATE 2.5 MG/1
5 TABLET ORAL DAILY
Refills: 0 | Status: DISCONTINUED | OUTPATIENT
Start: 2023-03-10 | End: 2023-03-10

## 2023-03-10 RX ORDER — ATORVASTATIN CALCIUM 80 MG/1
80 TABLET, FILM COATED ORAL AT BEDTIME
Refills: 0 | Status: DISCONTINUED | OUTPATIENT
Start: 2023-03-10 | End: 2023-03-10

## 2023-03-10 RX ORDER — AMLODIPINE BESYLATE 2.5 MG/1
2 TABLET ORAL
Qty: 60 | Refills: 0
Start: 2023-03-10 | End: 2023-04-08

## 2023-03-10 RX ORDER — ASPIRIN/CALCIUM CARB/MAGNESIUM 324 MG
81 TABLET ORAL DAILY
Refills: 0 | Status: DISCONTINUED | OUTPATIENT
Start: 2023-03-10 | End: 2023-03-10

## 2023-03-10 RX ORDER — ATORVASTATIN CALCIUM 80 MG/1
1 TABLET, FILM COATED ORAL
Qty: 30 | Refills: 0
Start: 2023-03-10 | End: 2023-04-08

## 2023-03-10 RX ORDER — ASPIRIN/CALCIUM CARB/MAGNESIUM 324 MG
1 TABLET ORAL
Qty: 30 | Refills: 0
Start: 2023-03-10 | End: 2023-04-08

## 2023-03-10 RX ORDER — CLOPIDOGREL BISULFATE 75 MG/1
75 TABLET, FILM COATED ORAL DAILY
Refills: 0 | Status: DISCONTINUED | OUTPATIENT
Start: 2023-03-10 | End: 2023-03-10

## 2023-03-10 RX ADMIN — AMLODIPINE BESYLATE 5 MILLIGRAM(S): 2.5 TABLET ORAL at 09:16

## 2023-03-10 RX ADMIN — Medication 325 MILLIGRAM(S): at 09:16

## 2023-03-10 RX ADMIN — Medication 81 MILLIGRAM(S): at 09:15

## 2023-03-10 RX ADMIN — VALSARTAN 160 MILLIGRAM(S): 80 TABLET ORAL at 09:15

## 2023-03-10 RX ADMIN — CLOPIDOGREL BISULFATE 300 MILLIGRAM(S): 75 TABLET, FILM COATED ORAL at 01:58

## 2023-03-10 RX ADMIN — CLOPIDOGREL BISULFATE 75 MILLIGRAM(S): 75 TABLET, FILM COATED ORAL at 09:16

## 2023-03-10 NOTE — CONSULT NOTE ADULT - SUBJECTIVE AND OBJECTIVE BOX
Patient is a 47y old  Female who presents with a chief complaint of TIA (10 Mar 2023 01:49)      HPI:  46 yo Female w/ PMH of HTN, presented with speech difficulties and balance issues on 3/9at 1 AM. She returned from work and felt off balance, had difficulty getting words out, and slurred speech.  She states that symptoms lasted for about one minute. On 3/9 in the afternoon while at work, she again noticed slurred speech. This was also noted by her coworkers. She states that she also felt as if she had a right facial droop.  She reports having had a strange headache severe.  She reports having transient weakness in 2018.   In the ED her NIHSS was 0.    She reports a history of hypertension and states that she is not compliant with medications. When BP was checked at work she states that it was ~ 240/140.  She also reports that she was feeling stressed and anxious.    She reports a family history of hypertension and stroke at a young age - maternal cousin had a stroke at age 38 and her maternal grandmother had a stroke at age 51.  She is not aware of any family history of clotting disorders.    PAST MEDICAL & SURGICAL HISTORY:  HTN (hypertension)  Anemia  H/O dilation and curettage          FAMILY HISTORY:  Maternal cousin and maternal grandmother with HTN and strokes    Social Hx:  Nonsmoker, no drug use, social alcohol use    MEDICATIONS  (STANDING):  amLODIPine   Tablet 5 milliGRAM(s) Oral daily  aspirin enteric coated 81 milliGRAM(s) Oral daily  atorvastatin 80 milliGRAM(s) Oral at bedtime  clopidogrel Tablet 75 milliGRAM(s) Oral daily  ferrous    sulfate 325 milliGRAM(s) Oral daily  valsartan 160 milliGRAM(s) Oral daily       Allergies    clindamycin (Swelling (Mild); Urticaria)    Intolerances        ROS: Pertinent positives in HPI, all other ROS were reviewed and are negative.      Vital Signs Last 24 Hrs  T(C): 36.6 (10 Mar 2023 08:48), Max: 37.1 (10 Mar 2023 00:17)  T(F): 97.9 (10 Mar 2023 08:48), Max: 98.7 (10 Mar 2023 00:17)  HR: 59 (10 Mar 2023 08:48) (59 - 74)  BP: 139/92 (10 Mar 2023 08:48) (139/90 - 238/140)  BP(mean): 100 (10 Mar 2023 06:03) (100 - 118)  RR: 18 (10 Mar 2023 08:48) (18 - 19)  SpO2: 99% (10 Mar 2023 08:48) (92% - 100%)    Parameters below as of 10 Mar 2023 08:48  Patient On (Oxygen Delivery Method): room air            Constitutional: awake and alert.  HEENT: PERRLA, EOMI,   Neck: Supple.  Respiratory: Breath sounds are clear bilaterally  Cardiovascular: S1 and S2, regular / irregular rhythm  Gastrointestinal: soft, nontender  Extremities:  no edema  Musculoskeletal: no joint swelling/tenderness, no abnormal movements  Skin: No rashes    Neurological exam:  HF: A x O x 3. Appropriately interactive, normal affect. Speech fluent, No Aphasia or paraphasic errors. Naming /repetition intact   CN: MALI, EOMI, VFF, facial sensation normal, no NLFD, tongue midline, Palate moves equally, SCM equal bilaterally  Motor: No pronator drift, Strength 5/5 in all 4 ext, normal bulk and tone, no tremor, rigidity or bradykinesia.    Sens: Intact to light touch   Reflexes: Symmetric and normal . BJ 2+, BR 2+, KJ 2+, downgoing toes b/l  Coord:  finger to nose intact b/l, heel to shin intact bilaterally  Gait/Balance: Not tested    NIHSS: 0          Labs:   03-10    142  |  107  |  16  ----------------------------<  100<H>  3.0<L>   |  29  |  0.98    Ca    9.1      10 Mar 2023 08:28    TPro  8.1  /  Alb  3.3  /  TBili  0.3  /  DBili  x   /  AST  31  /  ALT  15  /  AlkPhos  57  03-09                              10.0   9.68  )-----------( 467      ( 10 Mar 2023 08:28 )             32.3       Radiology:  CT head 3/9/23:  No acute intracranial hemorrhage or mass effect.    If clinical suspicion for acute infarct persists, brain MRI can be   obtained if there is no contraindication.    CTA head and neck, CT perfusion 3/9/23:  CT perfusion:  No perfusion abnormality.    CT angiography neck:  1.  No hemodynamically significant stenosis of the bilateral cervical   ICAs using NASCET criteria.  Patent vertebral arteries.  No evidence of   vascular dissection.  2.  A 9 mm right thyroid nodule.    CT angiography brain: No large vessel occlusion. Stenosis of the right A2   segment. No evidence of aneurysm.    
HPI:  46 yo Female w/ PMH of HTN, presented with speech difficulties and balance issues beginning at 1am last night. Episodes had been on-off consisting of feeling off balance, difficulty getting words out, and slurred speech. She had returned to baseline up until 515 pm yesterday when she suddenly developed similar symptoms again that resolved upon arrival to the ER. Her symptoms yesterday was observed by her coworkers. She had no symptoms in the ED. Her NIHSS score in ED was 0. No other complain today. She never had stroke or stroke like symptoms before.  (10 Mar 2023 01:49)    Admitted for TIA.  on brain imaging had CVA.  "Small foci of restricted diffusion involving L caudate body, L posterior lentiform nucleus, mid corona radiata, L mid temporal lobe c/w acute L MCA territory   infarction."    Reviewed symptoms w/ pt.  Longstanding hx of htn since 20s.  No prior hx CAD. family hx of premature HTN & CVA      PAST MEDICAL AND SURGICAL HISTORY:  PAST MEDICAL & SURGICAL HISTORY:  HTN (hypertension)      Anemia      H/O dilation and curettage          ALLERGIES:  Allergies    clindamycin (Swelling (Mild); Urticaria)    Intolerances        SOCIAL HISTORY:  Social History:  Lives at home.  Non smoker.  Non alcoholic. (10 Mar 2023 01:49)      FAMILY  HISTORY:  FAMILY HISTORY:  No pertinent family history in first degree relatives        MEDICATIONS:  OUTPATIENT:  Home Medications:  Exforge 5 mg-160 mg oral tablet: 1 tab(s) orally once a day  *** Patient can&#x27;t recall the dose*** (09 Mar 2023 23:00)      INPATIENT:  MEDICATIONS  (STANDING):  amLODIPine   Tablet 5 milliGRAM(s) Oral daily  aspirin enteric coated 81 milliGRAM(s) Oral daily  atorvastatin 80 milliGRAM(s) Oral at bedtime  clopidogrel Tablet 75 milliGRAM(s) Oral daily  ferrous    sulfate 325 milliGRAM(s) Oral daily  valsartan 160 milliGRAM(s) Oral daily    MEDICATIONS  (PRN):    MEDICATIONS  (PRN):      REVIEW OF SYSTEMS:  ===============================  ===============================  CONSTITUTIONAL: No weakness, fevers or chills  EYES/ENT: No visual changes;  No vertigo or throat pain   NECK: No pain or stiffness  RESPIRATORY: No cough, wheezing, hemoptysis; No shortness of breath  CARDIOVASCULAR: No chest pain or palpitations  GASTROINTESTINAL: No abdominal or epigastric pain. No nausea, vomiting, or hematemesis;   No diarrhea or constipation. No melena or hematochezia.  GENITOURINARY: No dysuria, frequency or hematuria  NEUROLOGICAL: No numbness or weakness  SKIN: No itching, burning, rashes, or lesions   All other review of systems is negative unless indicated above    Vital Signs Last 24 Hrs  T(C): 36.6 (10 Mar 2023 08:48), Max: 37.1 (10 Mar 2023 00:17)  T(F): 97.9 (10 Mar 2023 08:48), Max: 98.7 (10 Mar 2023 00:17)  HR: 59 (10 Mar 2023 08:48) (59 - 71)  BP: 139/92 (10 Mar 2023 08:48) (139/90 - 174/113)  BP(mean): 100 (10 Mar 2023 06:03) (100 - 118)  RR: 18 (10 Mar 2023 08:48) (18 - 19)  SpO2: 99% (10 Mar 2023 08:48) (99% - 100%)    Parameters below as of 10 Mar 2023 08:48  Patient On (Oxygen Delivery Method): room air        I&O's Summary      I&O's Detail      PHYSICAL EXAM:    Constitutional: NAD, awake  HEENT: PERR, EOMI,  No oral cyananosis.  Neck:  supple,  No JVD  Respiratory: Breath sounds are clear bilaterally, No wheezing, rales or rhonchi  Cardiovascular: S1 and S2, regular rate and rhythm, no Murmurs, gallops or rubs  Gastrointestinal: Bowel Sounds present, soft, nontender.   Extremities: No peripheral edema. No clubbing or cyanosis.  Vascular: 2+ peripheral pulses  Neurological: A/O x 3, no focal deficits  Musculoskeletal: no calf tenderness.  Skin: No rashes.    ===============================  ===============================  LABS:                         10.0   9.68  )-----------( 467      ( 10 Mar 2023 08:28 )             32.3                         9.9    10.06 )-----------( 417      ( 09 Mar 2023 18:46 )             31.7     10 Mar 2023 08:28    142    |  107    |  16     ----------------------------<  100    3.0     |  29     |  0.98   09 Mar 2023 18:46    134    |  103    |  15     ----------------------------<  85     4.0     |  29     |  0.98     Ca    9.1        10 Mar 2023 08:28  Ca    8.8        09 Mar 2023 18:46    TPro  8.1    /  Alb  3.3    /  TBili  0.3    /  DBili  x      /  AST  31     /  ALT  15     /  AlkPhos  57     09 Mar 2023 18:46    PT/INR - ( 09 Mar 2023 18:46 )   PT: 12.6 sec;   INR: 1.09 ratio         PTT - ( 09 Mar 2023 18:46 )  PTT:26.3 sec    THYROID STUDIES:    ===============================  ===============================  CARDIAC BIOMARKERS:  -------  -BNP VALUES:    -------  -TROPONIN VALUES:   Troponin I, High Sensitivity Result: 30.17 ng/L (03-09-23 @ 18:46)  Troponin T, High Sensitivity Result: 10 ng/L [0 - 51] (08-01-18 @ 01:20)  Troponin T, High Sensitivity Result: 10 ng/L [0 - 51] (07-31-18 @ 23:23)  Troponin T, Serum: <0.01 ng/mL [0.00 - 0.06] (07-12-16 @ 00:12)  Troponin T, Serum: <0.01 ng/mL [0.00 - 0.06] (07-11-16 @ 18:24)       ===============================  ===============================  EKG: Nsr lvh no isch changes    TELE: no arrhythmias.    ===============================  ECHO  03/10/2023 :     Summary     The left ventricle is normal in size, wall motion and contractility.   Mild concentric left ventricular hypertrophy is present.   LV obstruction cannot be ruled out.   Estimated left ventricular ejection fraction is 60 %.   Normal appearing left atrium.   Normal appearing right atrium.   Normal appearing right ventricle structure and function.   Normal aortic valve structure and function.   Normal appearing mitral valve structure and function.   Trace mitral regurgitation is present.   Normal appearing tricuspid valve structure.   Mild (1+) tricuspid valve regurgitation is present.   Normal appearing pulmonic valve structure and function.   Trace pulmonic valvular regurgitation is present.   All visualized extra cardiac structures appears to be normal.     Signature     ----------------------------------------------------------------   Electronically signed by Silvia Fraga MD(Interpreting   physician) on 03/10/2023 03:10 PM   ----------------------------------------------------------------                  ===============================    Aleks Guevara M.D.  Cardiology, [facility]  Cell: [phone]  Office:   [phone] ([facility] Office)  [phone] ([facility] Office)    ===============================

## 2023-03-10 NOTE — H&P ADULT - ASSESSMENT
A/P:    1.  TIA  -follow clinically with neurochecks in telemetry unit  -follow PT/OT eval  -follow speech eval  -follow Echo  -follow MR report  -follow labs  -follow Neurology consult  -started on Aspirin and Plavix    2.  SCD for DVT ppx    3.  Code status: Full code  A/P:    1.  TIA  -follow clinically with neurochecks in telemetry unit  -follow PT/OT eval  -follow speech eval  -follow Echo  -follow MR report  -follow labs  -follow Neurology consult  -started on Aspirin and Plavix    2.  SCD for DVT ppx    3.  Code status: Full code     4.  Anemia  -most likely iron deficiency anemia  -follow CBC, follow iron level, TIBC level  -started on Ferrous sulfate

## 2023-03-10 NOTE — DISCHARGE NOTE PROVIDER - NSDCMRMEDTOKEN_GEN_ALL_CORE_FT
amLODIPine 5 mg oral tablet: 2 tab(s) orally once a day   aspirin 81 mg oral delayed release tablet: 1 tab(s) orally once a day  atorvastatin 80 mg oral tablet: 1 tab(s) orally once a day (at bedtime)  clopidogrel 75 mg oral tablet: 1 tab(s) orally once a day  Exforge 5 mg-160 mg oral tablet: 1 tab(s) orally once a day  *** Patient can&#x27;t recall the dose***  ferrous sulfate 325 mg (65 mg elemental iron) oral tablet: 1 tab(s) orally once a day  valsartan 160 mg oral tablet: 1 tab(s) orally once a day

## 2023-03-10 NOTE — DISCHARGE NOTE PROVIDER - CARE PROVIDER_API CALL
Aleks Guevara)  Cardiology  [address] [place] [address]  Phone: [phone]  Fax: [phone]  Follow Up Time:     Giuliana Ortiz)  Clinical Neurophysiology; EEGEpilepsy; Neurology; Sleep Medicine  [address], [address] [place] [address]  Phone: [phone]  Fax: [phone]  Follow Up Time:

## 2023-03-10 NOTE — DISCHARGE NOTE NURSING/CASE MANAGEMENT/SOCIAL WORK - NSDCPEFALRISK_GEN_ALL_CORE
For information on Fall & Injury Prevention, visit: [email or web address] OR  [email or web address] OR  https://www.cdc.gov/steadi/patient.html

## 2023-03-10 NOTE — SPEECH LANGUAGE PATHOLOGY EVALUATION - COMMENTS
pt able to maintain a conversation with turn-taking and appropriate exchange of information. Repetition: Intact. No evidence of agrammatism, apraxia of motor speech, dysarthria.   Confrontation naming: mild latencies with the Low Frequency items, but WNL.  Picture Description: able to describe events in the picture: Fluent sentences with o evidence of syntax or grammatic disturbance.   Pt is back to baseline and Service will not follow. She states that symptoms lasted for about one minute. On 3/9 in the afternoon while at work, she again noticed slurred speech. This was also noted by her coworkers. She states that she also felt as if she had a right facial droop.  She reports having had a strange headache severe.  She reports having transient weakness in 2018.   In the ED her NIHSS was 0.  She reports a history of hypertension and states that she is not compliant with medications. When BP was checked at work she states that it was ~ 240/140.  She also reports that she was feeling stressed and anxious. She reports a family history of hypertension and stroke at a young age - maternal cousin had a stroke at age 38 and her maternal grandmother had a stroke at age 51.  She is not aware of any family history of clotting disorders. Comprehension for Complex ideational Material WNL.

## 2023-03-10 NOTE — DISCHARGE NOTE PROVIDER - NSDCFUSCHEDAPPT_GEN_ALL_CORE_FT
Aleks Guevara Physician [facility]  CARDIOLOGY [address]  Scheduled Appointment: 03/24/2023

## 2023-03-10 NOTE — DISCHARGE NOTE PROVIDER - HOSPITAL COURSE
Reason for Admission: TIA  History of Present Illness:   46 yo Female w/ PMH of HTN, presented with speech difficulties and balance issues beginning at 1am last night. Episodes had been on-off consisting of feeling off balance, difficulty getting words out, and slurred speech. She had returned to baseline up until 515 pm yesterday when she suddenly developed similar symptoms again that resolved upon arrival to the ER. Her symptoms yesterday was observed by her coworkers. She had no symptoms in the ED. Her NIHSS score in ED was 0. No other complain today. She never had stroke or stroke like symptoms before.      Medical progress: No motor or sensory deficitis. Patient notes of chronically elevated blood pressures in SBP 170s and DBPs in the 100s  Complaints: no new complaints  State of mind: normal       REVIEW OF SYSTEMS:  General: NAD, hemodynamically stable  HEENT:  Eyes:  No visual loss, blurred vision, double vision or yellow sclerae  SKIN:  No rash or itching.  CARDIOVASCULAR:  No chest pain  RESPIRATORY:  No shortness of breath  GASTROINTESTINAL:  No anorexia, nausea, vomiting or diarrhea  NEUROLOGICAL:  No headache, dizziness  MUSCULOSKELETAL:  No muscle, back pain, joint pain or stiffness.  HEMATOLOGIC:  No anemia, bleeding or bruising.  LYMPHATICS:  No enlarged nodes. No history of splenectomy.  ENDOCRINOLOGIC:  No reports of sweating, cold or heat intolerance. No polyuria or polydipsia.  ALLERGIES:  No history of asthma, hives, eczema or rhinitis.

## 2023-03-10 NOTE — DISCHARGE NOTE NURSING/CASE MANAGEMENT/SOCIAL WORK - PATIENT PORTAL LINK FT
You can access the FollowMyHealth Patient Portal offered by [facility] by registering at the following website: [email or web address]. By joining [facility]’s FollowMyHealth portal, you will also be able to view your health information using other applications (apps) compatible with our system.

## 2023-03-10 NOTE — CONSULT NOTE ADULT - PROBLEM SELECTOR RECOMMENDATION 9
-Discussed w/ neuro. Multiple territories but overall more consistent w/ severe HTN as the cause  rather than cardioembolic.  However, DILIA & MCOT are reasonable to exclude   cardioembolic causes.  -Low suspicion for cardioembolic causes - OK to perform as OP.  -MCOT placed in hospital, DILIA will be scheduled in next week.  -Followup in 2 weeks for review of MCOT results.    OK to d/c.

## 2023-03-10 NOTE — DISCHARGE NOTE PROVIDER - NSDCCPCAREPLAN_GEN_ALL_CORE_FT
PRINCIPAL DISCHARGE DIAGNOSIS  Diagnosis: Acute ischemic stroke  Assessment and Plan of Treatment: - all foci of restricted diffusion involving the left caudate body, the   left posterior lentiform nucleus, the mid corona radiata, and left mid   temporal lobe, consistent with acute left MCA territory infarction.  - on aspirin / plavix  - continue with cholesterol panel  WHAT IS A STROKE? A stroke (CVA) occurs when blood flow to brain is interrupted causing lack of oxygen.  THINGS TO DO: (1) Manage your health conditions like diabetes or hypertension (2) Monitor your blood pressure (3) Avoid nicotine products – smoking can cause damage to your blood vessels and increase your risk for a stroke (4) Maintain a healthy weight (5) Stay active with exercise (6) Limit or avoid alcohol intake – alcohol can raise your blood pressure (7) Eat heart healthy foods like fruits, vegetables, and whole grains  MONITOR THESE SIGNS AND SYMPTOMS: (1) Loss of balance or confusion (2) Double vision or vision loss (3) Chest pain or shortness of breath (4) Trouble swallowing. If you experience any of these, DO alert your primary care provider, or return to the Emergency Department if you feel very sick.      SECONDARY DISCHARGE DIAGNOSES  Diagnosis: HTN (hypertension)  Assessment and Plan of Treatment: - please maintain 3 times a day blood pressure monitoring  - you have a poorly controlled blood pressures that need tight monitoring and control     PRINCIPAL DISCHARGE DIAGNOSIS  Diagnosis: Acute ischemic stroke  Assessment and Plan of Treatment: - all foci of restricted diffusion involving the left caudate body, the   left posterior lentiform nucleus, the mid corona radiata, and left mid   temporal lobe, consistent with acute left MCA territory infarction.  - on aspirin / plavix  - continue with cholesterol panel  - PLease follow up with Dr. Aleks Pavon as an outpatient  WHAT IS A STROKE? A stroke (CVA) occurs when blood flow to brain is interrupted causing lack of oxygen.  THINGS TO DO: (1) Manage your health conditions like diabetes or hypertension (2) Monitor your blood pressure (3) Avoid nicotine products – smoking can cause damage to your blood vessels and increase your risk for a stroke (4) Maintain a healthy weight (5) Stay active with exercise (6) Limit or avoid alcohol intake – alcohol can raise your blood pressure (7) Eat heart healthy foods like fruits, vegetables, and whole grains  MONITOR THESE SIGNS AND SYMPTOMS: (1) Loss of balance or confusion (2) Double vision or vision loss (3) Chest pain or shortness of breath (4) Trouble swallowing. If you experience any of these, DO alert your primary care provider, or return to the Emergency Department if you feel very sick.      SECONDARY DISCHARGE DIAGNOSES  Diagnosis: HTN (hypertension)  Assessment and Plan of Treatment: - please maintain 3 times a day blood pressure monitoring  - you have a poorly controlled blood pressures that need tight monitoring and control

## 2023-03-10 NOTE — PROVIDER CONTACT NOTE (OTHER) - SITUATION
DR. MADISON'S ANSWERING SERVICE AWARE OF CONSULT.
Consult called in spoke with Maria Elena
Faxed discharge instructions to office. - Bria OTERO

## 2023-03-10 NOTE — CONSULT NOTE ADULT - ASSESSMENT
46 yo Female w/ PMH of HTN with episodes of transient slurred speech and balance difficulties.  BP in triage was 238/140.    Transient slurred speech and balance difficulties  -Differential diagnosis includes TIA vs hypertensive urgency  -Continue monitoring on telemetry at this time.  -Continue aspirin + Plavix for now  -CTA does show stenosis of the right A2 segment otherwise no major vascular abnormalities  -f/u echo  -Will get routine EEG  -MRI brain  -Statin    Dr. Elizabeth will  cover on 3/11 and 3/12 and f/u if needed.

## 2023-03-10 NOTE — PHYSICAL THERAPY INITIAL EVALUATION ADULT - MODALITIES TREATMENT COMMENTS
pt left in bed supine post Eval; HM in place; callbell in reach; eunice well; denied pain / symptoms

## 2023-03-10 NOTE — H&P ADULT - HISTORY OF PRESENT ILLNESS
48 yo Female w/ PMH of HTN, presented with speech difficulties and balance issues beginning at 1am last night. Episodes had been on-off consisting of feeling off balance, difficulty getting words out, and slurred speech. She had returned to baseline up until 515 pm yesterday when she suddenly developed similar symptoms again that resolved upon arrival to the ER. Her symptoms yesterday was observed by her coworkers. She had no symptoms in the ED. Her NIHSS score in ED was 0. No other complain today. She never had stroke or stroke like symptoms before.

## 2023-03-10 NOTE — PATIENT PROFILE ADULT - FALL HARM RISK - UNIVERSAL INTERVENTIONS
Bed in lowest position, wheels locked, appropriate side rails in place/Call bell, personal items and telephone in reach/Instruct patient to call for assistance before getting out of bed or chair/Non-slip footwear when patient is out of bed/[place] to call system/Physically safe environment - no spills, clutter or unnecessary equipment/Purposeful Proactive Rounding/Room/bathroom lighting operational, light cord in reach

## 2023-03-10 NOTE — H&P ADULT - NSHPPHYSICALEXAM_GEN_ALL_CORE
T(C): 37.1 (03-10-23 @ 00:17), Max: 37.1 (03-10-23 @ 00:17)  HR: 71 (03-10-23 @ 00:17) (67 - 74)  BP: 166/83 (03-10-23 @ 00:17) (157/92 - 238/140)  RR: 18 (03-09-23 @ 23:19) (18 - 19)  SpO2: 99% (03-10-23 @ 00:17) (92% - 100%)    CONSTITUTIONAL: Well groomed, no apparent distress  EYES: PERRLA and symmetric, EOMI, No conjunctival or scleral injection, non-icteric  ENMT: Oral mucosa with moist membranes. Normal dentition; no pharyngeal injection or exudates             NECK: Supple, symmetric and without tracheal deviation   RESP: No respiratory distress, no use of accessory muscles; CTA b/l, no WRR  CV: RRR, +S1S2, no MRG; no JVD; no peripheral edema  GI: Soft, NT, ND, no rebound, no guarding; no palpable masses; no hepatosplenomegaly; no hernia palpated  LYMPH: No cervical LAD or tenderness; no axillary LAD or tenderness; no inguinal LAD or tenderness  MSK: Normal ROM without pain, no spinal tenderness, normal muscle strength/tone  SKIN: No rashes or ulcers noted; no subcutaneous nodules or induration palpable  NEURO: CN II-XII intact; normal reflexes in upper and lower extremities, sensation intact in upper and lower extremities b/l to light touch   PSYCH: Appropriate insight/judgment; A+O x 3, mood and affect appropriate, recent/remote memory intact

## 2023-03-13 ENCOUNTER — INPATIENT (INPATIENT)
Facility: HOSPITAL | Age: 48
LOS: 2 days | Discharge: HOME | DRG: 41 | End: 2023-03-16
Attending: PSYCHIATRY & NEUROLOGY | Admitting: PSYCHIATRY & NEUROLOGY
Payer: COMMERCIAL

## 2023-03-13 VITALS
RESPIRATION RATE: 20 BRPM | OXYGEN SATURATION: 100 % | SYSTOLIC BLOOD PRESSURE: 192 MMHG | WEIGHT: 184.97 LBS | HEIGHT: 64 IN | DIASTOLIC BLOOD PRESSURE: 102 MMHG | HEART RATE: 98 BPM | TEMPERATURE: 98 F

## 2023-03-13 DIAGNOSIS — R29.898 OTHER SYMPTOMS AND SIGNS INVOLVING THE MUSCULOSKELETAL SYSTEM: ICD-10-CM

## 2023-03-13 DIAGNOSIS — Z98.89 OTHER SPECIFIED POSTPROCEDURAL STATES: Chronic | ICD-10-CM

## 2023-03-13 LAB
ALBUMIN SERPL ELPH-MCNC: 4.2 G/DL — SIGNIFICANT CHANGE UP (ref 3.3–5)
ALP SERPL-CCNC: 69 U/L — SIGNIFICANT CHANGE UP (ref 40–120)
ALT FLD-CCNC: 10 U/L — SIGNIFICANT CHANGE UP (ref 10–45)
ANION GAP SERPL CALC-SCNC: 13 MMOL/L — SIGNIFICANT CHANGE UP (ref 5–17)
APTT BLD: 30.7 SEC — SIGNIFICANT CHANGE UP (ref 27.5–35.5)
AST SERPL-CCNC: 15 U/L — SIGNIFICANT CHANGE UP (ref 10–40)
BASOPHILS # BLD AUTO: 0.09 K/UL — SIGNIFICANT CHANGE UP (ref 0–0.2)
BASOPHILS NFR BLD AUTO: 0.8 % — SIGNIFICANT CHANGE UP (ref 0–2)
BILIRUB SERPL-MCNC: 0.2 MG/DL — SIGNIFICANT CHANGE UP (ref 0.2–1.2)
BUN SERPL-MCNC: 24 MG/DL — HIGH (ref 7–23)
CALCIUM SERPL-MCNC: 9.8 MG/DL — SIGNIFICANT CHANGE UP (ref 8.4–10.5)
CHLORIDE SERPL-SCNC: 100 MMOL/L — SIGNIFICANT CHANGE UP (ref 96–108)
CO2 SERPL-SCNC: 23 MMOL/L — SIGNIFICANT CHANGE UP (ref 22–31)
CREAT SERPL-MCNC: 0.89 MG/DL — SIGNIFICANT CHANGE UP (ref 0.5–1.3)
EGFR: 80 ML/MIN/1.73M2 — SIGNIFICANT CHANGE UP
EOSINOPHIL # BLD AUTO: 0.25 K/UL — SIGNIFICANT CHANGE UP (ref 0–0.5)
EOSINOPHIL NFR BLD AUTO: 2.3 % — SIGNIFICANT CHANGE UP (ref 0–6)
FLUAV AG NPH QL: SIGNIFICANT CHANGE UP
FLUBV AG NPH QL: SIGNIFICANT CHANGE UP
GLUCOSE SERPL-MCNC: 106 MG/DL — HIGH (ref 70–99)
HCT VFR BLD CALC: 33.6 % — LOW (ref 34.5–45)
HGB BLD-MCNC: 10.1 G/DL — LOW (ref 11.5–15.5)
IMM GRANULOCYTES NFR BLD AUTO: 0.4 % — SIGNIFICANT CHANGE UP (ref 0–0.9)
INR BLD: 1.03 RATIO — SIGNIFICANT CHANGE UP (ref 0.88–1.16)
LYMPHOCYTES # BLD AUTO: 3.69 K/UL — HIGH (ref 1–3.3)
LYMPHOCYTES # BLD AUTO: 34.3 % — SIGNIFICANT CHANGE UP (ref 13–44)
MCHC RBC-ENTMCNC: 22.6 PG — LOW (ref 27–34)
MCHC RBC-ENTMCNC: 30.1 GM/DL — LOW (ref 32–36)
MCV RBC AUTO: 75.2 FL — LOW (ref 80–100)
MONOCYTES # BLD AUTO: 0.97 K/UL — HIGH (ref 0–0.9)
MONOCYTES NFR BLD AUTO: 9 % — SIGNIFICANT CHANGE UP (ref 2–14)
NEUTROPHILS # BLD AUTO: 5.72 K/UL — SIGNIFICANT CHANGE UP (ref 1.8–7.4)
NEUTROPHILS NFR BLD AUTO: 53.2 % — SIGNIFICANT CHANGE UP (ref 43–77)
NRBC # BLD: 0 /100 WBCS — SIGNIFICANT CHANGE UP (ref 0–0)
PA ADP PRP-ACNC: 214 PRU — SIGNIFICANT CHANGE UP (ref 194–417)
PLATELET # BLD AUTO: 417 K/UL — HIGH (ref 150–400)
PLATELET RESPONSE ASPIRIN RESULT: 365 ARU — SIGNIFICANT CHANGE UP (ref 350–700)
POTASSIUM SERPL-MCNC: 3.4 MMOL/L — LOW (ref 3.5–5.3)
POTASSIUM SERPL-SCNC: 3.4 MMOL/L — LOW (ref 3.5–5.3)
PROT SERPL-MCNC: 8.2 G/DL — SIGNIFICANT CHANGE UP (ref 6–8.3)
PROTHROM AB SERPL-ACNC: 11.8 SEC — SIGNIFICANT CHANGE UP (ref 10.5–13.4)
RBC # BLD: 4.47 M/UL — SIGNIFICANT CHANGE UP (ref 3.8–5.2)
RBC # FLD: 19.7 % — HIGH (ref 10.3–14.5)
RSV RNA NPH QL NAA+NON-PROBE: SIGNIFICANT CHANGE UP
SARS-COV-2 RNA SPEC QL NAA+PROBE: SIGNIFICANT CHANGE UP
SODIUM SERPL-SCNC: 136 MMOL/L — SIGNIFICANT CHANGE UP (ref 135–145)
TROPONIN T, HIGH SENSITIVITY RESULT: 8 NG/L — SIGNIFICANT CHANGE UP (ref 0–51)
WBC # BLD: 10.76 K/UL — HIGH (ref 3.8–10.5)
WBC # FLD AUTO: 10.76 K/UL — HIGH (ref 3.8–10.5)

## 2023-03-13 PROCEDURE — 70498 CT ANGIOGRAPHY NECK: CPT | Mod: 26,MA

## 2023-03-13 PROCEDURE — 70450 CT HEAD/BRAIN W/O DYE: CPT | Mod: 26,MA,59

## 2023-03-13 PROCEDURE — 99285 EMERGENCY DEPT VISIT HI MDM: CPT

## 2023-03-13 PROCEDURE — 70496 CT ANGIOGRAPHY HEAD: CPT | Mod: 26,MA

## 2023-03-13 PROCEDURE — 0042T: CPT | Mod: MA

## 2023-03-13 RX ORDER — CLOPIDOGREL BISULFATE 75 MG/1
75 TABLET, FILM COATED ORAL DAILY
Refills: 0 | Status: DISCONTINUED | OUTPATIENT
Start: 2023-03-13 | End: 2023-03-16

## 2023-03-13 RX ORDER — ATORVASTATIN CALCIUM 80 MG/1
80 TABLET, FILM COATED ORAL AT BEDTIME
Refills: 0 | Status: DISCONTINUED | OUTPATIENT
Start: 2023-03-13 | End: 2023-03-16

## 2023-03-13 RX ORDER — ASPIRIN/CALCIUM CARB/MAGNESIUM 324 MG
81 TABLET ORAL DAILY
Refills: 0 | Status: DISCONTINUED | OUTPATIENT
Start: 2023-03-13 | End: 2023-03-16

## 2023-03-13 RX ORDER — INFLUENZA VIRUS VACCINE 15; 15; 15; 15 UG/.5ML; UG/.5ML; UG/.5ML; UG/.5ML
0.5 SUSPENSION INTRAMUSCULAR ONCE
Refills: 0 | Status: DISCONTINUED | OUTPATIENT
Start: 2023-03-13 | End: 2023-03-16

## 2023-03-13 RX ORDER — ENOXAPARIN SODIUM 100 MG/ML
40 INJECTION SUBCUTANEOUS EVERY 24 HOURS
Refills: 0 | Status: DISCONTINUED | OUTPATIENT
Start: 2023-03-13 | End: 2023-03-16

## 2023-03-13 RX ADMIN — Medication 81 MILLIGRAM(S): at 21:58

## 2023-03-13 RX ADMIN — ATORVASTATIN CALCIUM 80 MILLIGRAM(S): 80 TABLET, FILM COATED ORAL at 21:58

## 2023-03-13 RX ADMIN — CLOPIDOGREL BISULFATE 75 MILLIGRAM(S): 75 TABLET, FILM COATED ORAL at 21:58

## 2023-03-13 RX ADMIN — ENOXAPARIN SODIUM 40 MILLIGRAM(S): 100 INJECTION SUBCUTANEOUS at 21:58

## 2023-03-13 NOTE — PATIENT PROFILE ADULT - FALL HARM RISK - HARM RISK INTERVENTIONS

## 2023-03-13 NOTE — H&P ADULT - NSHPPHYSICALEXAM_GEN_ALL_CORE
Neurological Exam:  Mental Status: Orientated to self, date and place.  Attention intact.  No dysarthria. Speech fluent.  Cranial Nerves:   PERRL, EOMI, VFF, no nystagmus.    CN V1-3 intact to light touch .  No facial asymmetry.  Hearing intact to finger rub bilaterally.  Tongue, uvula and palate midline.  Sternocleidomastoid and Trapezius intact bilaterally.    Motor:   Tone: normal.                  Strength:     [] Upper extremity                      Delt       Bicep    Tricep                                                  R         5/5 5/5 5/5 5/5                                               L          5/5 5/5 5/5 5/5  [] Lower extremity                       HF          KE          KF        DF         PF                                               R        5/5 5/5 5/5 5/5 5/5                                               L         5/5 5/5 5/5 5/5        5/5  Pronator drift: none                 Dysmetria: None to finger-nose-finger or heel-shin-heel  No truncal ataxia.    Tremor: No resting, postural or action tremor.  No myoclonus.    Sensation: intact to light touch    Gait: normal.

## 2023-03-13 NOTE — H&P ADULT - NSHPADDITIONALINFOADULT_GEN_ALL_CORE
STROKE FELLOW ATTESTATION    47 year old woman w/ PMH of HTN, recent ischemic strokes (L. MCA territory) presented w/ RLE weakness and speech disturbance. On exam, patient with mild aphasia otherwise non focal exam.     MR brain w/o contrast 3.14.23: New area of diffusion restriction in the R. posterior temporal lobe; Previous area of diffusion restriction in the L. CR, L. BG, and L. temporal lobe consistent w/ recent infarctions.   CTA  h/n demonstrates short segment stenosis of the mid R. A2.   CTP bilateral frontal tmax delay, ?artifact    Imp: Recurrent episodes of speech difficulties and ?RHP now improved with MRI evidence of diffusion restriction in the L. BG, L. temporal lobe and R. temporal lobe. Etiology uncertain, possibly acute ischemic stroke versus CNS inflammatory process (ie MS, etc) versus systemic inflammatory process with CNS involvement (i.e vasculitis), versus HTN emergency with end organ damage.    A1c 5.8     /97   TTE hyperdynamic LV; Normal LA; No LVT    Plan   Repeat MR brain w/w/o contrast  Defer LP for now

## 2023-03-13 NOTE — H&P ADULT - HISTORY OF PRESENT ILLNESS
48 y/o female PMHx HTN, recent infarcts in L MCA territory presenting to [facility] ED as a code stroke for RLE weakness and speech disturbance, LKW 4PM today. Patient was [place] [employer] when she felt these sx. Upon exam, RLE weakness resolved, and patient is a 5/5 in RLE on exam. Patient able to ambulate without difficulty.     Patient was in [facility] for speech difficulty and balance issues and feeling like she has a right facial droop. In the ED, patient's NIHSS was 0. MRI showed small foci of restricted diffusion in the Left caudate body, left posterior lentiform nucleus, mid corona radiate, and left mid temporal lobe consistent with L MCA vascular territory. CTH was initially negative, CTA showed stenosis of the right A2 and no perfusion abnormalities. Patient was discharged on ASA and Plavix per CHANCE trial as well as Lipitor.     Not a tenecteplase candidate given sx resolved.   Not a thrombectomy candidate given no LVO.  46 y/o female PMHx HTN, recent infarcts in L MCA territory presenting to [facility] ED as a code stroke for RLE weakness and speech disturbance, LKW 4PM today. Patient was [place] in [employer] when she felt these sx. Upon exam, RLE weakness resolved, and patient is a 5/5 in RLE on exam. Patient able to ambulate without difficulty.     Patient was in [facility] for speech difficulty and balance issues and feeling like she has a right facial droop. In the ED, patient's NIHSS was 0. MRI showed small foci of restricted diffusion in the Left caudate body, left posterior lentiform nucleus, mid corona radiate, and left mid temporal lobe consistent with L MCA vascular territory. CTH was initially negative, CTA showed stenosis of the right A2 and no perfusion abnormalities. Patient was discharged on ASA and Plavix per CHANCE trial as well as Lipitor.     Denies numbness, vision changes, HA, dizziness.     Not a tenecteplase candidate given sx resolved.   Not a thrombectomy candidate given no LVO.

## 2023-03-13 NOTE — ED PROVIDER NOTE - PHYSICAL EXAMINATION
Attn - alert, NAD, no pallor or jaundice, PERRL 3 mm, moist mm, skin - warm and dry, Lungs - clear, no w/r/r, good BS bilaterally, Cor - rr, no M, no rub, Abdo - ND, soft, NT, no HSM, no CVAT, no guarding or rebound. Extremities - no edema, no calf tenderness, distal pulses intact and symmetrical, Neuro - CN intact, no facial asymmetry.  sl clumsiness R leg, but able to ambulate, sensation intact, no upper extrem drift.  speech is not fluent, but clear.

## 2023-03-13 NOTE — ED PROVIDER NOTE - CLINICAL SUMMARY MEDICAL DECISION MAKING FREE TEXT BOX
Attn - pt with acute onset of R side weakness and expressive aphasia at approx 4 pm today which is improving and hx of Left MCA infarct on 3/9/23  Code Stroke called on pt arrival with Neuro consult and CT, CTA and profusion studies and labs, EKG.

## 2023-03-13 NOTE — H&P ADULT - ATTENDING COMMENTS
I agree with above assessment and plan per fellow note.  Differential diagnosis is wide considering  Right temporal hyperintensity on diffusion weighted imaging corresponding with ADC.  Patient has known history of left hemispheric ischemic infarcts, treated at [facility]. I agree with above assessment and plan per fellow note.  Differential diagnosis is wide considering  New Right temporal hyperintensity on diffusion weighted imaging corresponding with ADC - as compared to previous MRI.   Patient has known history of left hemispheric ischemic infarcts, treated at [facility].    DDx Includes -    Embolic ischemic infarcts  Hypertensive emergency  Although unlikely, inflammatory/demyelinating process–therefore will need MRI brain with and without contrast  Low suspicion for vasculitis given no abnormality noted on most recent MRI.  To consider lumbar puncture to rule out alternative etiology and / or look for inflammatory markers for stroke in young work up

## 2023-03-13 NOTE — ED ADULT NURSE NOTE - OBJECTIVE STATEMENT
CODE STROKE: 48y/o F coming to the ED s/p weakness to R leg and difficulty finding words. Pt states that around 4[place] when she began to have R leg "drooping" and difficulty find her words. Pt states that a L MCA thursday with residuals that resolved. Pt denies any  CP/SOB/Fever/Chills/N/V/D. On exam PT A&Ox3, neg facial drop, neg slurred speech, PERRL 3mm, denies blurred vision, equal sensations in all extremities. Denies numbness, tingling, or weakness. Heart monitor placed, EKG completed. Abdomen soft, nontender, nondistended. IV placed, labs collected and sent.

## 2023-03-13 NOTE — H&P ADULT - ASSESSMENT
48 y/o female PMHx HTN, recent infarcts in L MCA territory presenting to [facility] ED as a code stroke for RLE weakness and speech disturbance, LKW 4PM today. Patient was walking in [employer] when she felt these sx. Upon exam, RLE weakness resolved, and patient is a 5/5 in RLE on exam. Patient able to ambulate without difficulty.     Patient was in [facility] for speech difficulty and balance issues and feeling like she has a right facial droop. In the ED, patient's NIHSS was 0. MRI showed small foci of restricted diffusion in the Left caudate body, left posterior lentiform nucleus, mid corona radiate, and left mid temporal lobe consistent with L MCA vascular territory. CTH was initially negative, CTA showed stenosis of the right A2 and no perfusion abnormalities. Patient was discharged on ASA and Plavix per CHANCE trial as well as Lipitor.     Not a tenecteplase candidate given sx resolved.   Not a thrombectomy candidate given no LVO.     Impression:     Recommendations:     []  C/w ASA81/[id number] for 3 weeks followed by ASA 81 alone  [] ARU/PRU   []  Atorvastatin 80, titrate to LDL<70  [] vEEG  []  MRI brain w/o contrast   [] TTE showed LVEF 60%  []  DVT prophylaxis   []  Telemonitoring;  Neurochecks q4  []  Permissive HTN up to 220/120 mmHg for first 24 hours after the symptom onset followed by gradual normotension.   []  Please send HbA1C and Lipid Panel  []  PT/OT evaluation  []  NPO until clears Dysphagia screen, otherwise Swallow evaluation  []  Stroke education provided    46 y/o female PMHx HTN, recent infarcts in L MCA territory presenting to [facility] ED as a code stroke for RLE weakness and speech disturbance, LKW 4PM today. Patient was walking in [employer] when she felt these sx. Upon exam, RLE weakness resolved, and patient is a 5/5 in RLE on exam. Patient able to ambulate without difficulty.     Patient was in [facility] for speech difficulty and balance issues and feeling like she has a right facial droop. In the ED, patient's NIHSS was 0. MRI showed small foci of restricted diffusion in the Left caudate body, left posterior lentiform nucleus, mid corona radiate, and left mid temporal lobe consistent with L MCA vascular territory. CTH was initially negative, CTA showed stenosis of the right A2 and no perfusion abnormalities. Patient was discharged on ASA and Plavix per CHANCE trial as well as Lipitor.     Denies numbness, vision changes, HA, dizziness.     Not a tenecteplase candidate given sx resolved.   Not a thrombectomy candidate given no LVO.     Impression: Transient RLE with speech disturbance likely 2/2 Left brain dysfunction due to acute ischemia in L MCA territory vs TIA vs recrudescence. Mechanism ESUS.     Recommendations:     []  C/w ASA81/[id number] for 3 weeks followed by ASA 81 alone  [] ARU/PRU   []  Atorvastatin 80, titrate to LDL<70  [] vEEG  []  MRI brain w/o contrast   [] TTE showed LVEF 60%  []  DVT prophylaxis-Lovenox   []  Telemonitoring;  Neurochecks q4  []  Permissive HTN up to 220/120 mmHg for first 24 hours after the symptom onset followed by gradual normotension.   []  Please send HbA1C and Lipid Panel  []  PT/OT evaluation  []  NPO until clears Dysphagia screen, otherwise Swallow evaluation  []  Stroke education provided     Case d/w stroke fellow under supervision of stroke attending.

## 2023-03-13 NOTE — ED PROVIDER NOTE - OBJECTIVE STATEMENT
Attn - pt seen in CT scan - Code Stroke called on arrival.  pt reports acute onset approx 4pm today of difficulty finding words and weakness right side.  pt was seen [facility] for similar complaint on 3/9/23 and MRI on 3/10 showed Left MCA infarct.  Symptoms improved post CVA.  Symptoms seem to be improving now.  NO: h/a, dizziness, change in vision, sensation.  Pt has PMHx - HTN, Meds - Atorvastatin, Valsartan, Plavix, ASA, Amlodipine, Fe++,  Allergy - clindamycin.

## 2023-03-13 NOTE — ED ADULT TRIAGE NOTE - NS ED NURSE DIRECT TO ROOM YN
Advised daughter that we will contact her once we get results in . IF an appt is needed we will schedule her . Pt verbalized understanding .   Yes

## 2023-03-13 NOTE — ED ADULT NURSE NOTE - TEMPLATE LIST FOR HEAD TO TOE ASSESSMENT
Patient Quality Checklist              Description: To Do List            Create Date: 07/28/2020     Due Date: 10/22/2020     Sanct Date:   Subject:   Notes:   Provider: Lexy John      Description: Remind: 6 MO appt.  Create Date: 09/06/2019     Due Date: 05/26/2020     Sanct Date: 05/26/2020  Subject: Office Visit  Notes: reschedule appt.  - See note  Provider: Marielos Gil  March 12, 2020 12:52:03 PM Ebony Romero: JYOTI on 3/12 to reschedule appt for 6month with Dr. HARDY  May 13, 2020 3:57:39 PM Ebony Romero: LM to schedule 6 mos f/u    May 20, 2020 2:52:33 PM Ebony Romero.: L/M 3x to schedule 6 mos f/u    May 26, 2020 7:59:26 AM Lexy John.: Scheduling correspondence sent.    May 26, 2020 10:28:43 AM Maki Camejo: Pt schedled for 8/10 at 12:00 in [place].    Description: Remind: 6 MO appt.  Create Date: 02/24/2020     Due Date: 03/06/2020     Sanct Date: 03/06/2020  Subject:   Notes:   Provider: Demetrius Marrero  Patient needs a 6 mo appointment in March February 24, 2020 10:41:24 AM Demetrius Marrero.:     March 6, 2020 4:18:36 PM Ewelina Hanson.: Duplicate QCL. WIll continue with the other open QCL.  
Neuro

## 2023-03-14 LAB
ANION GAP SERPL CALC-SCNC: 12 MMOL/L — SIGNIFICANT CHANGE UP (ref 5–17)
BUN SERPL-MCNC: 18 MG/DL — SIGNIFICANT CHANGE UP (ref 7–23)
CALCIUM SERPL-MCNC: 9.5 MG/DL — SIGNIFICANT CHANGE UP (ref 8.4–10.5)
CHLORIDE SERPL-SCNC: 102 MMOL/L — SIGNIFICANT CHANGE UP (ref 96–108)
CO2 SERPL-SCNC: 26 MMOL/L — SIGNIFICANT CHANGE UP (ref 22–31)
CREAT SERPL-MCNC: 0.79 MG/DL — SIGNIFICANT CHANGE UP (ref 0.5–1.3)
EGFR: 93 ML/MIN/1.73M2 — SIGNIFICANT CHANGE UP
GLUCOSE SERPL-MCNC: 91 MG/DL — SIGNIFICANT CHANGE UP (ref 70–99)
HCT VFR BLD CALC: 30.6 % — LOW (ref 34.5–45)
HGB BLD-MCNC: 9.2 G/DL — LOW (ref 11.5–15.5)
MCHC RBC-ENTMCNC: 22.4 PG — LOW (ref 27–34)
MCHC RBC-ENTMCNC: 30.1 GM/DL — LOW (ref 32–36)
MCV RBC AUTO: 74.5 FL — LOW (ref 80–100)
NRBC # BLD: 0 /100 WBCS — SIGNIFICANT CHANGE UP (ref 0–0)
PLATELET # BLD AUTO: 383 K/UL — SIGNIFICANT CHANGE UP (ref 150–400)
POTASSIUM SERPL-MCNC: 3 MMOL/L — LOW (ref 3.5–5.3)
POTASSIUM SERPL-SCNC: 3 MMOL/L — LOW (ref 3.5–5.3)
RBC # BLD: 4.11 M/UL — SIGNIFICANT CHANGE UP (ref 3.8–5.2)
RBC # FLD: 19.8 % — HIGH (ref 10.3–14.5)
SODIUM SERPL-SCNC: 140 MMOL/L — SIGNIFICANT CHANGE UP (ref 135–145)
WBC # BLD: 9.43 K/UL — SIGNIFICANT CHANGE UP (ref 3.8–10.5)
WBC # FLD AUTO: 9.43 K/UL — SIGNIFICANT CHANGE UP (ref 3.8–10.5)

## 2023-03-14 PROCEDURE — 74177 CT ABD & PELVIS W/CONTRAST: CPT | Mod: 26

## 2023-03-14 PROCEDURE — 93312 ECHO TRANSESOPHAGEAL: CPT | Mod: 26

## 2023-03-14 PROCEDURE — 93306 TTE W/DOPPLER COMPLETE: CPT | Mod: 26

## 2023-03-14 PROCEDURE — 95816 EEG AWAKE AND DROWSY: CPT | Mod: 26

## 2023-03-14 PROCEDURE — 99223 1ST HOSP IP/OBS HIGH 75: CPT

## 2023-03-14 PROCEDURE — 93356 MYOCRD STRAIN IMG SPCKL TRCK: CPT

## 2023-03-14 PROCEDURE — G0452: CPT | Mod: 26

## 2023-03-14 PROCEDURE — 72197 MRI PELVIS W/O & W/DYE: CPT | Mod: 26

## 2023-03-14 PROCEDURE — 93970 EXTREMITY STUDY: CPT | Mod: 26

## 2023-03-14 PROCEDURE — 70551 MRI BRAIN STEM W/O DYE: CPT | Mod: 26

## 2023-03-14 PROCEDURE — 71260 CT THORAX DX C+: CPT | Mod: 26

## 2023-03-14 RX ORDER — POTASSIUM CHLORIDE 20 MEQ
20 PACKET (EA) ORAL
Refills: 0 | Status: COMPLETED | OUTPATIENT
Start: 2023-03-14 | End: 2023-03-14

## 2023-03-14 RX ADMIN — Medication 20 MILLIEQUIVALENT(S): at 12:13

## 2023-03-14 RX ADMIN — ENOXAPARIN SODIUM 40 MILLIGRAM(S): 100 INJECTION SUBCUTANEOUS at 21:35

## 2023-03-14 RX ADMIN — CLOPIDOGREL BISULFATE 75 MILLIGRAM(S): 75 TABLET, FILM COATED ORAL at 12:12

## 2023-03-14 RX ADMIN — Medication 81 MILLIGRAM(S): at 12:12

## 2023-03-14 RX ADMIN — ATORVASTATIN CALCIUM 80 MILLIGRAM(S): 80 TABLET, FILM COATED ORAL at 21:34

## 2023-03-14 RX ADMIN — Medication 20 MILLIEQUIVALENT(S): at 14:12

## 2023-03-14 NOTE — PHYSICAL THERAPY INITIAL EVALUATION ADULT - GAIT DEVIATIONS NOTED, PT EVAL
decreased HS on R with L lateral trunk lean./decreased gordon/decreased step length/decreased stride length

## 2023-03-14 NOTE — SPEECH LANGUAGE PATHOLOGY EVALUATION - COMMENTS
Continued history:   -Upon exam, RLE weakness resolved, and patient is a 5/5 in RLE on exam. Patient able to ambulate without difficulty.     -CT BRAIN 3/13: No CT evidence of acute intracranial hemorrhage, mass effect, or midline shift. Hypoattenuation within the left basal ganglia corresponding to foci of restricted diffusion seen on comparison MR, compatible with known subacute infarcts.    -MRI Brain: 3/10: Small foci of restricted diffusion involving the left caudate body, the left posterior lentiform nucleus, the mid corona radiata, and left mid temporal lobe, consistent with acute left MCA territory infarction. No hemorrhagic transformation.      Swallow/Speech history: No reports in SCM at [facility]. Pt off unit unavailable to discuss.  Appreciated pt completing a language evaluation on 3/10/23 at T: "Pt is back to baseline and Service will not follow."

## 2023-03-14 NOTE — PHYSICAL THERAPY INITIAL EVALUATION ADULT - WEIGHT-BEARING RESTRICTIONS: STAND/SIT, REHAB EVAL
Other s/p procedural sedation.  pt called and is doing well, without complaints. full weight-bearing

## 2023-03-14 NOTE — OCCUPATIONAL THERAPY INITIAL EVALUATION ADULT - PERTINENT HX OF CURRENT PROBLEM, REHAB EVAL
46 y/o female PMHx HTN, recent infarcts in L MCA territory presenting to [facility] ED as a code stroke for RLE weakness and speech disturbance, LKW 4PM today. Patient was [place] [employer] when she felt these sx. Upon exam, RLE weakness resolved, and patient is a 5/5 in RLE on exam. Patient able to ambulate without difficulty.  Patient was in [facility] for speech difficulty and balance issues and feeling like she has a right facial droop. In the ED, patient's NIHSS was 0. MRI showed small foci of restricted diffusion in the Left caudate body, left posterior lentiform nucleus, mid corona radiate, and left mid temporal lobe consistent with L MCA vascular territory. CTH was initially negative, CTA showed stenosis of the right A2 and no perfusion abnormalities. Patient was discharged on ASA and Plavix per CHANCE trial as well as Lipitor.   CT brain 3/13-No CT evidence of acute intracranial hemorrhage, mass effect, or midline shift. Hypoattenuation within the left basal ganglia corresponding to foci of restricted diffusion seen on comparison MR, compatible with known subacute infarcts.  CTA Neck 3/13: No flow-limiting stenosis in the carotid or vertebral arteries.  CTA Head 3/13: No large vessel occlusion about the [place] of [place]. Short focal stenosis of the mid A2 segment of the right CELESTINE.  CT Perfusion 3/13: Perfusion abnormalities throughout the bilateral cerebri and left cerebellum, most suggestive of artifact given the distribution of abnormality.

## 2023-03-14 NOTE — PHYSICAL THERAPY INITIAL EVALUATION ADULT - NSPTDISCHREC_GEN_A_CORE
Home with outpatient physical therapy services to increase strength, balance, and endurance to improve all functional mobility./Outpatient PT

## 2023-03-14 NOTE — SPEECH LANGUAGE PATHOLOGY EVALUATION - SLP DIAGNOSIS
Patient currently of the unit. RN Jose Ramon aware of attempt. Will defer and re-attempt pending pt schedule. Abril Oviedo MS CCC-SLP Prefer teams   extension [phone]

## 2023-03-14 NOTE — PHYSICAL THERAPY INITIAL EVALUATION ADULT - PERTINENT HX OF CURRENT PROBLEM, REHAB EVAL
48 y/o female PMHx HTN, recent infarcts in L MCA territory presenting to [facility] ED as a code stroke for RLE weakness and speech disturbance, LKW 4PM today. Patient was walking in [employer] when she felt these sx. Upon exam, RLE weakness resolved, and patient is a 5/5 in RLE on exam. Patient able to ambulate without difficulty. Patient was in [facility] for speech difficulty and balance issues and feeling like she has a right facial droop. In the ED, patient's NIHSS was 0. MRI showed small foci of restricted diffusion in the Left caudate body, left posterior lentiform nucleus, mid corona radiate, and left mid temporal lobe consistent with L MCA vascular territory. CTH was initially negative, CTA showed stenosis of the right A2 and no perfusion abnormalities. Patient was discharged on ASA and Plavix per CHANCE trial as well as Lipitor. CTA Neck 3/13: No flow-limiting stenosis in the carotid or vertebral arteries. CTA Head 3/13: No large vessel occlusion about the [place]. Short focal stenosis of the mid A2 segment of the right CELESTINE. CT Perfusion 3/13: Perfusion abnormalities throughout the bilateral cerebri and left cerebellum, most suggestive of artifact given the distribution of abnormality. CT Brain 3/13 - No CT evidence of acute intracranial hemorrhage, mass effect, or midline shift. Hypoattenuation within the left basal ganglia corresponding to foci of restricted diffusion seen on comparison MR, compatible with known subacute infarcts. MR Head 3/10 Small foci of restricted diffusion involving the left caudate body, the left posterior lentiform nucleus, the mid corona radiata, and left mid temporal lobe, consistent with acute left MCA territory infarction. No hemorrhagic transformation. XR Chest 3/9 + Minimal atelectasis. !2 lead EKG 3/9 - Normal sinus rhythm. Possible Left atrial enlargement. Left ventricular hypertrophy.

## 2023-03-14 NOTE — PHYSICAL THERAPY INITIAL EVALUATION ADULT - ACTIVE RANGE OF MOTION EXAMINATION, REHAB EVAL
matthew. upper extremity Active ROM was WNL (within normal limits)/bilateral lower extremity Active ROM was WNL (within normal limits)

## 2023-03-14 NOTE — PHYSICAL THERAPY INITIAL EVALUATION ADULT - ADDITIONAL COMMENTS
Pt. lives in a 2 story PH with her 2 children with 0 GHASSAN and one flight to the bedroom. Patient states she was independent in all ADLs/IADLs prior to admission, +drive. Pt. is a nurse. Pt. does not own or use any AD.

## 2023-03-14 NOTE — PHYSICAL THERAPY INITIAL EVALUATION ADULT - PLANNED THERAPY INTERVENTIONS, PT EVAL
GOAL: patient will be able to negotiate 12 stairs (I) with one HR in 2 weeks/balance training/bed mobility training/gait training/strengthening/transfer training

## 2023-03-14 NOTE — EEG REPORT - NS EEG TEXT BOX
ROUTINE EEG    NINO BELL [id number]     Study Date: 03-14-23  Duration: 20 min  --------------------------------------------------------------------------------------------------  History:  CC/ HPI Patient is a 47y old  Female who presents with a chief complaint of stroke work up (13 Mar 2023 18:41)    MEDICATIONS  (STANDING):  aspirin  chewable 81 milliGRAM(s) Oral daily  atorvastatin 80 milliGRAM(s) Oral at bedtime  clopidogrel Tablet 75 milliGRAM(s) Oral daily  enoxaparin Injectable 40 milliGRAM(s) SubCutaneous every 24 hours  influenza   Vaccine 0.5 milliLiter(s) IntraMuscular once    --------------------------------------------------------------------------------------------------  Study Interpretation:    [[[Abbreviation Key:  PDR=alpha rhythm/posterior dominant rhythm. A-P=anterior posterior.  Amplitude: ‘very low’:<20; ‘low’:20-49; ‘medium’:; ‘high’:>150uV.  Persistence for periodic/rhythmic patterns (% of epoch) ‘rare’:<1%; ‘occasional’:1-10%; ‘frequent’:10-50%; ‘abundant’:50-90%; ‘continuous’:>90%.  Persistence for sporadic discharges: ‘rare’:<1/hr; ‘occasional’:1/min-1/hr; ‘frequent’:>1/min; ‘abundant’:>1/10 sec.  RPP=rhythmic and periodic patterns; GRDA=generalized rhythmic delta activity; FIRDA=frontal intermittent GRDA; LRDA=lateralized rhythmic delta activity; TIRDA=temporal intermittent rhythmic delta activity;  LPD=PLED=lateralized periodic discharges; GPD=generalized periodic discharges; BIPDs =bilateral independent periodic discharges; Mf=multifocal; SIRPDs=stimulus induced rhythmic, periodic, or ictal appearing discharges; BIRDs=brief potentially ictal rhythmic discharges >4 Hz, lasting .5-10s; PFA (paroxysmal bursts >13 Hz or =8 Hz <10s).  Modifiers: +F=with fast component; +S=with spike component; +R=with rhythmic component.  S-B=burst suppression pattern.  Max=maximal. N1-drowsy; N2-stage II sleep; N3-slow wave sleep. SSS/BETS=small sharp spikes/benign epileptiform transients of sleep. HV=hyperventilation; PS=photic stimulation]]]    Daily EEG Visual Analysis    FINDINGS:      Background:  Continuity: Continuous  Symmetry: Symmetric  Posterior dominant rhythm (PDR): 10.5-11 Hz, reactive to eye closure. Symmetric low-amplitude frontal beta in wakefulness.  State Change: Present  Voltage: Normal  Anterior Posterior Gradient: Present  Other background findings: None  Breach: Absent    Background Slowing:  Generalized slowing: None  Focal slowing: None    State Changes:   Drowsiness is characterized by fragmentation, attenuation, and slowing of the background frequencies.  Stage 2 sleep is characterized by symmetric K complexes and sleep spindles.     Sporadic Epileptiform Discharges:    None    Rhythmic and Periodic Patterns (RPPs):  None     Electrographic and Electroclinical seizures:  None    Other Clinical Events:  None    Activation Procedures:   Hyperventilation was not performed.    Photic stimulation was performed and did not elicit any abnormalities.      Artifacts:  Intermittent myogenic and movement artifacts are present.    EKG:  Single-lead EKG shows regular rhythm at 60-80 bpm.    EEG Classification / Summary:  Normal EEG in the awake, drowsy, and asleep states. No focal or epileptiform abnormalities are captured.     Clinical Impression:  A normal EEG neither refutes nor supports a diagnosis of epilepsy.          -------------------------------------------------------------------------------------------------------  [facility] EEG Reading Room Ph#: [phone]  Epilepsy Answering Service after 5PM and before 8:30AM: Ph#: [phone]    Regi Maddox MD  Attending Physician, [facility] [place]

## 2023-03-14 NOTE — SPEECH LANGUAGE PATHOLOGY EVALUATION - SLP PRECAUTIONS/LIMITATIONS: VISION
-Normal vision: sees adequately in most situations; can see medication labels, newsprint; contacts/eyeglasses

## 2023-03-14 NOTE — PHYSICAL THERAPY INITIAL EVALUATION ADULT - MANUAL MUSCLE TESTING RESULTS, REHAB EVAL
R UE grossly 4-/5, R  strength 3/5, R LE grossly 4-/5 besides R DF/PF strength grossly 3+/5. L LE/UE grossly 5/5

## 2023-03-14 NOTE — SPEECH LANGUAGE PATHOLOGY EVALUATION - H & P REVIEW
INNO BELL is a  46 y/o female w/ PMHx HTN, recent infarcts in L MCA territory presenting to [facility] ED as a code stroke for RLE weakness and speech disturbance, LKW 4PM 3/13/23. Patient was [place] [employer] when she felt these sx. She was in [facility] for speech difficulty and balance issues as well as feeling like she has a right facial droop. In the ED, patient's NIHSS was 0. MRI showed small foci of restricted diffusion in the Left caudate body, left posterior lentiform nucleus, mid corona radiate, and left mid temporal lobe consistent with L MCA vascular territory. CTH was initially negative, CTA showed stenosis of the right A2 and no perfusion abnormalities. Patient was discharged on ASA and Plavix per CHANCE trial as well as Lipitor./yes

## 2023-03-15 DIAGNOSIS — R26.81 UNSTEADINESS ON FEET: ICD-10-CM

## 2023-03-15 DIAGNOSIS — I63.9 CEREBRAL INFARCTION, UNSPECIFIED: ICD-10-CM

## 2023-03-15 DIAGNOSIS — I10 ESSENTIAL (PRIMARY) HYPERTENSION: ICD-10-CM

## 2023-03-15 DIAGNOSIS — R47.81 SLURRED SPEECH: ICD-10-CM

## 2023-03-15 DIAGNOSIS — D50.9 IRON DEFICIENCY ANEMIA, UNSPECIFIED: ICD-10-CM

## 2023-03-15 DIAGNOSIS — Z20.822 CONTACT WITH AND (SUSPECTED) EXPOSURE TO COVID-19: ICD-10-CM

## 2023-03-15 DIAGNOSIS — Z88.1 ALLERGY STATUS TO OTHER ANTIBIOTIC AGENTS STATUS: ICD-10-CM

## 2023-03-15 DIAGNOSIS — Z91.14 PATIENT'S OTHER NONCOMPLIANCE WITH MEDICATION REGIMEN: ICD-10-CM

## 2023-03-15 DIAGNOSIS — Z28.310 UNVACCINATED FOR COVID-19: ICD-10-CM

## 2023-03-15 DIAGNOSIS — I63.512 CEREBRAL INFARCTION DUE TO UNSPECIFIED OCCLUSION OR STENOSIS OF LEFT MIDDLE CEREBRAL ARTERY: ICD-10-CM

## 2023-03-15 DIAGNOSIS — Z82.49 FAMILY HISTORY OF ISCHEMIC HEART DISEASE AND OTHER DISEASES OF THE CIRCULATORY SYSTEM: ICD-10-CM

## 2023-03-15 DIAGNOSIS — Z82.3 FAMILY HISTORY OF STROKE: ICD-10-CM

## 2023-03-15 DIAGNOSIS — R29.700 NIHSS SCORE 0: ICD-10-CM

## 2023-03-15 DIAGNOSIS — I16.0 HYPERTENSIVE URGENCY: ICD-10-CM

## 2023-03-15 LAB
ANION GAP SERPL CALC-SCNC: 10 MMOL/L — SIGNIFICANT CHANGE UP (ref 5–17)
AT III ACT/NOR PPP CHRO: 99 % — SIGNIFICANT CHANGE UP (ref 85–135)
BUN SERPL-MCNC: 18 MG/DL — SIGNIFICANT CHANGE UP (ref 7–23)
CALCIUM SERPL-MCNC: 9.4 MG/DL — SIGNIFICANT CHANGE UP (ref 8.4–10.5)
CARDIOLIPIN AB SER-ACNC: NEGATIVE — SIGNIFICANT CHANGE UP
CHLORIDE SERPL-SCNC: 102 MMOL/L — SIGNIFICANT CHANGE UP (ref 96–108)
CO2 SERPL-SCNC: 25 MMOL/L — SIGNIFICANT CHANGE UP (ref 22–31)
CREAT SERPL-MCNC: 0.78 MG/DL — SIGNIFICANT CHANGE UP (ref 0.5–1.3)
DRVVT RATIO: 1.01 RATIO — SIGNIFICANT CHANGE UP (ref 0–1.21)
DRVVT SCREEN TO CONFIRM RATIO: SIGNIFICANT CHANGE UP
EGFR: 94 ML/MIN/1.73M2 — SIGNIFICANT CHANGE UP
GLUCOSE SERPL-MCNC: 96 MG/DL — SIGNIFICANT CHANGE UP (ref 70–99)
HCG UR QL: NEGATIVE — SIGNIFICANT CHANGE UP
HCT VFR BLD CALC: 31.2 % — LOW (ref 34.5–45)
HCYS SERPL-MCNC: 10.4 UMOL/L — SIGNIFICANT CHANGE UP
HGB BLD-MCNC: 9.4 G/DL — LOW (ref 11.5–15.5)
MCHC RBC-ENTMCNC: 22.5 PG — LOW (ref 27–34)
MCHC RBC-ENTMCNC: 30.1 GM/DL — LOW (ref 32–36)
MCV RBC AUTO: 74.6 FL — LOW (ref 80–100)
NRBC # BLD: 0 /100 WBCS — SIGNIFICANT CHANGE UP (ref 0–0)
PLATELET # BLD AUTO: 347 K/UL — SIGNIFICANT CHANGE UP (ref 150–400)
POTASSIUM SERPL-MCNC: 3.4 MMOL/L — LOW (ref 3.5–5.3)
POTASSIUM SERPL-SCNC: 3.4 MMOL/L — LOW (ref 3.5–5.3)
PROT C ACT/NOR PPP: 107 % — SIGNIFICANT CHANGE UP (ref 74–150)
RBC # BLD: 4.18 M/UL — SIGNIFICANT CHANGE UP (ref 3.8–5.2)
RBC # FLD: 19.6 % — HIGH (ref 10.3–14.5)
SODIUM SERPL-SCNC: 137 MMOL/L — SIGNIFICANT CHANGE UP (ref 135–145)
WBC # BLD: 8.71 K/UL — SIGNIFICANT CHANGE UP (ref 3.8–10.5)
WBC # FLD AUTO: 8.71 K/UL — SIGNIFICANT CHANGE UP (ref 3.8–10.5)

## 2023-03-15 PROCEDURE — 99233 SBSQ HOSP IP/OBS HIGH 50: CPT

## 2023-03-15 PROCEDURE — 70553 MRI BRAIN STEM W/O & W/DYE: CPT | Mod: 26

## 2023-03-15 PROCEDURE — 70544 MR ANGIOGRAPHY HEAD W/O DYE: CPT | Mod: 26,59

## 2023-03-15 RX ORDER — BENZOCAINE AND MENTHOL 5; 1 G/100ML; G/100ML
1 LIQUID ORAL
Refills: 0 | Status: DISCONTINUED | OUTPATIENT
Start: 2023-03-15 | End: 2023-03-16

## 2023-03-15 RX ADMIN — ATORVASTATIN CALCIUM 80 MILLIGRAM(S): 80 TABLET, FILM COATED ORAL at 22:18

## 2023-03-15 RX ADMIN — CLOPIDOGREL BISULFATE 75 MILLIGRAM(S): 75 TABLET, FILM COATED ORAL at 12:24

## 2023-03-15 RX ADMIN — Medication 81 MILLIGRAM(S): at 12:24

## 2023-03-15 RX ADMIN — ENOXAPARIN SODIUM 40 MILLIGRAM(S): 100 INJECTION SUBCUTANEOUS at 22:19

## 2023-03-15 NOTE — CONSULT NOTE ADULT - SUBJECTIVE AND OBJECTIVE BOX
CHIEF COMPLAINT:  Stroke    HISTORY OF PRESENT ILLNESS: 48 y/o female PMHx HTN, recent infarcts in L MCA territory presenting to [facility] ED as a code stroke for RLE weakness and speech disturbance, LKW 4PM today. Patient was walking in [employer] when she felt these sx. Upon exam, RLE weakness resolved, and patient is a 5/5 in RLE on exam. Patient able to ambulate without difficulty.     Patient was in [facility] for speech difficulty and balance issues and feeling like she has a right facial droop. In the ED, patient's NIHSS was 0. MRI showed small foci of restricted diffusion in the Left caudate body, left posterior lentiform nucleus, mid corona radiate, and left mid temporal lobe consistent with L MCA vascular territory. CTH was initially negative, CTA showed stenosis of the right A2 and no perfusion abnormalities. Patient was discharged on ASA and Plavix per CHANCE trial as well as Lipitor.    Cardiology consulted for cardiac management.  Pt denies cardiac history, does not see a cardiologist.  Currently denies chest pain, SOB, palpitations.     PAST MEDICAL & SURGICAL HISTORY:  HTN (hypertension)    Anemia    H/O ischemic left MCA stroke    H/O dilation and curettage    MEDICATIONS:  aspirin  chewable 81 milliGRAM(s) Oral daily  clopidogrel Tablet 75 milliGRAM(s) Oral daily  enoxaparin Injectable 40 milliGRAM(s) SubCutaneous every 24 hours    atorvastatin 80 milliGRAM(s) Oral at bedtime    benzocaine/menthol Lozenge 1 Lozenge Oral four times a day PRN  influenza   Vaccine 0.5 milliLiter(s) IntraMuscular once    FAMILY HISTORY:  No pertinent family history in first degree relatives    SOCIAL HISTORY:    [ ] Non-smoker  [ ] Smoker  [ ] Alcohol    Allergies    clindamycin (Swelling (Mild); Urticaria)    Intolerances    REVIEW OF SYSTEMS:  CONSTITUTIONAL: No fever, weight loss, + fatigue  EYES: No eye pain, visual disturbances, or discharge  ENMT:  No difficulty hearing, tinnitus, vertigo; No sinus or throat pain  NECK: No pain or stiffness  RESPIRATORY: No cough, wheezing, chills or hemoptysis; No Shortness of Breath  CARDIOVASCULAR: No chest pain, palpitations, passing out, dizziness, or leg swelling  GASTROINTESTINAL: No abdominal or epigastric pain. No nausea, vomiting, or hematemesis; No diarrhea or constipation. No melena or hematochezia.  GENITOURINARY: No dysuria, frequency, hematuria, or incontinence  NEUROLOGICAL: No headaches, memory loss, loss of strength, numbness, or tremors  SKIN: No itching, burning, rashes, or lesions   LYMPH Nodes: No enlarged glands  ENDOCRINE: No heat or cold intolerance; No hair loss  MUSCULOSKELETAL: No joint pain or swelling; No muscle, back, or extremity pain  PSYCHIATRIC: No depression, anxiety, mood swings, or difficulty sleeping  HEME/LYMPH: No easy bruising, or bleeding gums  ALLERY AND IMMUNOLOGIC: No hives or eczema	    [ ] All others negative	  [ ] Unable to obtain    PHYSICAL EXAM:  T(C): 36.9 (03-15-23 @ 10:30), Max: 36.9 (03-14-23 @ 18:00)  HR: 69 (03-15-23 @ 12:15) (66 - 95)  BP: 136/86 (03-15-23 @ 12:15) (111/57 - 159/98)  RR: 17 (03-15-23 @ 12:15) (11 - 19)  SpO2: 98% (03-15-23 @ 12:15) (92% - 100%)  Wt(kg): --  I&O's Summary    Appearance: Normal	  HEENT: Normal oral mucosa, PERRL, EOMI	  Lymphatic: No lymphadenopathy  Cardiovascular: Normal S1 S2, No JVD, No murmurs, No edema  Respiratory: Lungs clear to auscultation	  Psychiatry: A & O x 3, Mood & affect appropriate  Gastrointestinal:  Soft, Non-tender, + BS	  Skin: No rashes, No ecchymoses, No cyanosis	  NEUROLOGICAL EXAM:  Mental status: Awake, alert, oriented x3, no aphasia, no neglect, normal memory   Cranial Nerves: No facial asymmetry, no nystagmus, no dysarthria,  tongue midline  Motor exam: Normal tone, no drift, 5/5 RUE, 5/5 RLE, 5/5 LUE, 5/5 LLE, normal fine finger movements.  Sensation: Intact to light touch   Coordination/ Gait: No dysmetria, INGA intact and symmetric bilaterally  Vascular: Peripheral pulses palpable 2+ bilaterally    TELEMETRY: SR    ECG:  < from: Transthoracic Echocardiogram (03.14.23 @ 07:25) >    Patient name: NINO BELL  YOB: 1975   Age: 47 (F)   MR#: [id number]  Study Date: 3/14/2023  [facility]  Study quality: Technically good  Referring Physician: James Castañeda MD  Blood Pressure: 132/81 mmHg  Height: 163 cm  Weight: 84 kg  BSA: 1.9 m2  ------------------------------------------------------------------------  PROCEDURE: Transthoracic echocardiogram with 2-D, M-Mode  and complete spectral and color flow Doppler. Strain  Imaging.  INDICATION: Transient cerebral ischemic attack, unspecified  (G45.9)  ------------------------------------------------------------------------  Dimensions:    Normal Values:  LA:     3.3    2.0 - 4.0 cm  Ao:     3.3    2.0 - 3.8 cm  SEPTUM: 0.9    0.6 - 1.2 cm  PWT:    0.8    0.6 - 1.1 cm  LVIDd:  4.8    3.0 - 5.6 cm  LVIDs:  2.8    1.8 - 4.0 cm  Derived variables:  LVMI: 73 g/m2  RWT: 0.33  EF (Rachel Method): 75 %Doppler Peak Velocity (m/sec):  AoV=1.8 TV=2.0  ------------------------------------------------------------------------  Observations:  Mitral Valve: Normal mitral valve. Minimal mitral  regurgitation.  Aortic Valve/Aorta: Normal aortic valve.  Normal aortic root size.  Left Atrium: Mildly dilated left atrium.  Left Ventricle: Normal left ventricular internal dimensions  and wall thicknesses.  Hyperdynamic left ventricle.  Strain Imaging done on [employer].  Chaya GLS = -20.7% with an average HR of 57 bpm.  Normal diastolic function.  Right Heart: Normal right atrium. Normal right ventricular  size and function.  Normal tricuspid valve. Minimal tricuspid regurgitation.  Normal pulmonic valve.  Pericardium/Pleura: Normal pericardium with no pericardial  effusion.  Hemodynamic: IVC is normal. Pulmonary artery pressure is  normal.  ------------------------------------------------------------------------  Conclusions:  Hyperdynamic left ventricle.  ------------------------------------------------------------------------  Confirmed on  3/14/2023 - 10:26:20 by Malick Gordon MD, FASE  ------------------------------------------------------------------------    < end of copied text >  	  RADIOLOGY: < from: CT Abdomen and Pelvis w/ Oral Cont and w/ IV Cont (03.14.23 @ 18:36) >  ACC: [id number] EXAM:  CT ABDOMEN AND PELVIS OC IC   ORDERED BY:  BRIAN ROSADO     ACC: [id number] EXAM:  CT CHEST IC   ORDERED BY:  BRIAN ROSADO     PROCEDURE DATE:  03/14/2023          INTERPRETATION:  CLINICAL INFORMATION: CVA. Evaluate for occult  malignancy.    COMPARISON: None.    CONTRAST/COMPLICATIONS:  IV Contrast: Omnipaque 350  90 cc administered   10 cc discarded  Oral Contrast: Omnipaque 300  Complications: None reported at time of study completion    PROCEDURE:  CT of the Chest, Abdomen and Pelvis was performed.  Sagittal and coronal reformats were performed.    FINDINGS:  CHEST:  LUNGS AND LARGE AIRWAYS: Patent central airways. No pulmonary nodules.  PLEURA: No pleural effusion.  VESSELS: Within normal limits.  HEART: Heart size is normal. No pericardial effusion.  MEDIASTINUM AND MARAH: No lymphadenopathy.  CHEST WALL AND LOWER NECK: Within normal limits.    ABDOMEN AND PELVIS:  LIVER: 1.5 cm cyst right lobe.  BILE DUCTS: Normal caliber.  GALLBLADDER: Gallbladder sludge.  SPLEEN: Within normal limits.  PANCREAS: Within normal limits.  ADRENALS: Within normal limits.  KIDNEYS/URETERS: Within normal limits.    BLADDER: Within normal limits.  REPRODUCTIVE ORGANS: Enlarged heterogeneous uterine fundus suggesting   adenomyosis.    BOWEL: No bowel obstruction. Appendix is normal.  PERITONEUM: No ascites.  VESSELS: Within normal limits.  RETROPERITONEUM/LYMPH NODES: No lymphadenopathy.  ABDOMINAL WALL: Within normal limits.  BONES: Within normal limits.    IMPRESSION:  NoCT evidence of occult malignancy in the chest, abdomen and pelvis.        --- End of Report ---    < end of copied text >  < from: MR Pelvis Bony Only w/wo IV Cont (03.14.23 @ 13:10) >  ACC: [id number] EXAM:  MR PELVIS BONY ONLY WAW IC   ORDERED BY:  BRIAN ROSADO     PROCEDURE DATE:  03/14/2023          INTERPRETATION:  EXAMINATION:MR PELVIS BONY WITHOUT AND WITH IV CONTRAST    CLINICAL INDICATION:Stroke. Evaluate for malignancy.    COMPARISON: Correlation with 10/8/2016 pelvis ultrasound    TECHNIQUE: MRI of the pelvis (musculoskeletal protocol) was performed   without and with intravenous contrast. 7.5 mL Gadavist was administered   and 0 mL was discarded.    INTERPRETATION:    Bones and articular cartilage: There is no fracture or dislocation. There   is no evidence of avascular necrosis.    Soft tissues: There is no soft tissue mass or retracted tendon tear.   There is trace bilateral greater trochanteric bursitis.    Other: There is an enlarged uterus with suspected adenomyosis and/or   fibroids versus possibly thickening of the endometrium. There are   cervical nabothian cysts. Nabothian cysts. There is trace fluid in the   cul-de-sac.    IMPRESSION:  1.  No evidence of bony metastatic disease.    2.  Enlarged uterus with suspected adenomyosis and/or fibroids versus   possibly thickening of the endometrium. Advise correlation with   pre/menopausal status and pelvis ultrasound.    3.  Trace bilateral greater trochanteric bursitis.    --- End of Report ---    < end of copied text >  < from: MR Head No Cont (03.14.23 @ 13:09) >    ACC: [id number] EXAM:  MR BRAIN   ORDERED BY: EUSEBIA NICHOLESOURAV     PROCEDURE DATE:  03/14/2023          INTERPRETATION:  CLINICAL INDICATIONS: stroke    COMPARISON: MRI brain dated 3/10/2023. CT head dated 3/13/2023    TECHNIQUE: MRI brain: Multiplanar, multisequence MR imaging of the brain   are obtained without the administration of intravenous Gadavist contrast.    FINDINGS:  There is a new small area of abnormal restricted diffusion in the   posterior right temporal lobe image 41 of series 5..Stable to improved   small areas of abnormal restricted diffusion left corona radiata/left   basal ganglia and left temporal lobe compatible to recent infarctions.    Scattered periventricular and subcortical white matter T2 /FLAIR   hyperintensitiesare seen without mass effect, nonspecific, likely   representing mild chronic microvascular changes. Normal T2 flow-voids are   seen within  the intracranial vasculature. The lateral ventricles and   cortical sulci are age-appropriate in size and configuration. There is no   mass, mass effect, or extra-axial fluid collection. There is no   susceptibility artifact to suggest hemorrhage. Midline structures are   normal.    Mild inflammatory mucosal changes are seen throughout the various   portionsof the paranasal sinuses. The orbits and mastoid air cells are   unremarkable.    IMPRESSION: New small right temporal lobe infarct. Stable to improved   small left MCA territory infarctions.    --- End of Report ---    < end of copied text >    OTHER: 	  	  LABS:	 	    CARDIAC MARKERS: Troponin T, High Sensitivity Result: 8: Specimen not hemolyzed                 9.4    8.71  )-----------( 347      ( 15 Mar 2023 05:04 )             31.2     03-15    137  |  102  |  18  ----------------------------<  96  3.4<L>   |  25  |  0.78    Ca    9.4      15 Mar 2023 05:04    TPro  8.2  /  Alb  4.2  /  TBili  0.2  /  DBili  x   /  AST  15  /  ALT  10  /  AlkPhos  69  03-13    proBNP:   Lipid Profile: Lipid Profile (03.10.23 @ 08:28)   Cholesterol, Serum: 260 mg/dL   Triglycerides, Serum: 54 mg/dL   HDL Cholesterol, Serum: 52 mg/dL   Non HDL Cholesterol: 208 LDL Cholesterol Calculated: 198 mg/dL (03.10.23 @ 08:28)   HgA1c: A1C with Estimated Average Glucose Result: 5.8: Method: Immunoassay   TSH:

## 2023-03-15 NOTE — CONSULT NOTE ADULT - PROBLEM/RECOMMENDATION-1
Covering sw informed during huddle that pt mental status improving and OT rec is rehab again. SW sent updated therapy and progress notes to [facility] and [facility].    UPDATE: [facility] called, pt declined.    Ayala Peres, JESSICAW [id number]   DISPLAY PLAN FREE TEXT

## 2023-03-15 NOTE — CONSULT NOTE ADULT - ASSESSMENT
48 y/o female PMHx HTN, recent infarcts in L MCA territory presenting to [facility] ED as a code stroke for RLE weakness and speech disturbance, LKW 4PM today. Patient was walking in [employer] when she felt these sx. Upon exam, RLE weakness resolved, and patient is a 5/5 in RLE on exam. Patient able to ambulate without difficulty.

## 2023-03-15 NOTE — PROGRESS NOTE ADULT - ASSESSMENT
ASSESSMENT:     NEURO: Continue close monitoring for neurologic deterioration, permissive HTN, titrate statin to LDL goal less than 70, MRI Brain w/o, MRA Head w/o and Neck w/contrast. Physical therapy/OT/Speech eval/treatment.     ANTITHROMBOTIC THERAPY:     PULMONARY: CXR clear, protecting airway, saturating well     CARDIOVASCULAR: check TTE, cardiac monitoring                              SBP goal:     GASTROINTESTINAL:  dysphagia screen       Diet:     RENAL: BUN/Cr stable, good urine output      Na Goal: Greater than 135     Reyes:    HEMATOLOGY: H/H stable, Platelets normal      DVT ppx: Heparin s.c [] LMWH []     ID: afebrile, no leukocytosis     OTHER: Updated patient at bedside     DISPOSITION: Outpatient PT. No skilled OT needs.    CORE MEASURES:        Admission NIHSS:      TPA: [] YES [x] NO      LDL/HDL: N/A     Depression Screen: PASS     Statin Therapy: YES     Dysphagia Screen: [x] PASS [] FAIL     Smoking [] YES [x] NO      Afib [] YES [x] NO     Stroke Education [x] YES [] NO    Obtain screening lower extremity venous ultrasound in patients who meet 1 or more of the following criteria as patient is high risk for DVT/PE on admission:   [] History of DVT/PE  [] Hypercoagulable states (Factor V Leiden, Cancer, OCP, etc. )  [] Prolonged immobility (hemiplegia/hemiparesis/post operative or any other extended immobilization)  [] Transferred from outside facility (Rehab or Long term care)  [] Age </= to 50 48 YO female PMHx HTN, recent infarcts in L MCA territory presenting to [facility] ED as a code stroke for RLE weakness and speech disturbance, LKW 4PM today. Patient was walking in [employer] when she felt these sx. Upon exam, RLE weakness resolved, and patient is a 5/5 in RLE on exam. Patient able to ambulate without difficulty. Patient was in [facility] for speech difficulty and balance issues and feeling like she has a right facial droop. In the ED, patient's NIHSS was 0. MRI showed small foci of restricted diffusion in the Left caudate body, left posterior lentiform nucleus, mid corona radiate, and left mid temporal lobe consistent with L MCA vascular territory. CTH was initially negative, CTA showed stenosis of the right A2 and no perfusion abnormalities. Patient was discharged on ASA and Plavix per CHANCE trial as well as Lipitor.     NEURO: Continue close monitoring for neurologic deterioration. Goal BP is less than 160. Continue Lipitor 80mg, titrate statin to LDL goal less than 70. MRI Brain w/o, MRA Head w/o and Neck w/contrast as above. MRA Head and MRI Head ordered. s/p DILIA pending result. Physical therapy/OT recommended Outpatient PT, No skilled OT needs.     ANTITHROMBOTIC THERAPY: Continue ASA/Plavix    PULMONARY: CXR clear, protecting airway, saturating well     CARDIOVASCULAR: Continue cardiac monitoring. TTE 75%                              SBP goal: Less than 160    GASTROINTESTINAL: Passed dysphagia screen       Diet: Refular    RENAL: BUN/Cr stable, good urine output      Na Goal: Greater than 135     Reyes: NO    HEMATOLOGY: H/H stable, Platelets normal      DVT ppx: Lovenox 40 subq     ID: afebrile, no leukocytosis     OTHER: Updated patient at bedside     DISPOSITION: Outpatient PT. No skilled OT needs.    CORE MEASURES:        Admission NIHSS: 0     TPA: [] YES [x] NO      LDL/HDL: N/A     Depression Screen: PASS     Statin Therapy: YES     Dysphagia Screen: [x] PASS [] FAIL     Smoking [] YES [x] NO      Afib [] YES [x] NO     Stroke Education [x] YES [] NO    Obtain screening lower extremity venous ultrasound in patients who meet 1 or more of the following criteria as patient is high risk for DVT/PE on admission:   [] History of DVT/PE  [] Hypercoagulable states (Factor V Leiden, Cancer, OCP, etc. )  [] Prolonged immobility (hemiplegia/hemiparesis/post operative or any other extended immobilization)  [] Transferred from outside facility (Rehab or Long term care)  [] Age </= to 50 46 YO female PMHx HTN, recent infarcts in L MCA territory presenting to [facility] ED as a code stroke for RLE weakness and speech disturbance, LKW 4PM today. Patient was walking in [employer] when she felt these sx. Upon exam, RLE weakness resolved, and patient is a 5/5 in RLE on exam. Patient able to ambulate without difficulty. Patient was in [facility] for speech difficulty and balance issues and feeling like she has a right facial droop. In the ED, patient's NIHSS was 0. MRI showed small foci of restricted diffusion in the Left caudate body, left posterior lentiform nucleus, mid corona radiate, and left mid temporal lobe consistent with L MCA vascular territory. CTH was initially negative, CTA showed stenosis of the right A2 and no perfusion abnormalities. Patient was discharged on ASA and Plavix per CHANCE trial as well as Lipitor.     Impression:  Embolic ischemic infarcts - bi hemispheric   Hypertensive emergency  Although unlikely, inflammatory/demyelinating process–therefore will need MRI brain with and without contrast  Will keep off Plavix for 5 days then proceed with Lumbar puncture to rule out inflammatory etiology      NEURO: Continue close monitoring for neurologic deterioration. Goal BP is less than 160. Continue Lipitor 80mg, titrate statin to LDL goal less than 70. MRI Brain w/o, MRA Head w/o and Neck w/contrast as above. MRA Head and MRI Head ordered. s/p DILIA pending result. Physical therapy/OT recommended Outpatient PT, No skilled OT needs.     ANTITHROMBOTIC THERAPY: Continue ASA/Plavix    PULMONARY: CXR clear, protecting airway, saturating well     CARDIOVASCULAR: Continue cardiac monitoring. TTE 75%                              SBP goal: Less than 160    GASTROINTESTINAL: Passed dysphagia screen       Diet: Refular    RENAL: BUN/Cr stable, good urine output      Na Goal: Greater than 135     Reyes: NO    HEMATOLOGY: H/H stable, Platelets normal      DVT ppx: Lovenox 40 subq     ID: afebrile, no leukocytosis     OTHER: Updated patient at bedside     DISPOSITION: Outpatient PT. No skilled OT needs.    CORE MEASURES:        Admission NIHSS: 0     TPA: [] YES [x] NO      LDL/HDL: N/A     Depression Screen: PASS     Statin Therapy: YES     Dysphagia Screen: [x] PASS [] FAIL     Smoking [] YES [x] NO      Afib [] YES [x] NO     Stroke Education [x] YES [] NO    Obtain screening lower extremity venous ultrasound in patients who meet 1 or more of the following criteria as patient is high risk for DVT/PE on admission:   [] History of DVT/PE  [] Hypercoagulable states (Factor V Leiden, Cancer, OCP, etc. )  [] Prolonged immobility (hemiplegia/hemiparesis/post operative or any other extended immobilization)  [] Transferred from outside facility (Rehab or Long term care)  [] Age </= to 50

## 2023-03-15 NOTE — CONSULT NOTE ADULT - PROBLEM SELECTOR RECOMMENDATION 9
MR H - new small right temporal lobe infarct, stable to improved small left MCA territory infarctions  ASA/Plavix/Statin  TTE - EF 75%, min MR, mod dilated LA, min TR    - s/p DILIA, results pending   monitor on tele   management as per Stroke Team

## 2023-03-15 NOTE — PROGRESS NOTE ADULT - SUBJECTIVE AND OBJECTIVE BOX
THE PATIENT WAS SEEN AND EXAMINED BY ME WITH THE HOUSESTAFF AND STROKE TEAM DURING MORNING ROUNDS.   HPI:  46 y/o female PMHx HTN, recent infarcts in L MCA territory presenting to [facility] ED as a code stroke for RLE weakness and speech disturbance, LKW 4PM today. Patient was walking in [employer] when she felt these sx. Upon exam, RLE weakness resolved, and patient is a 5/5 in RLE on exam. Patient able to ambulate without difficulty.     Patient was in [facility] for speech difficulty and balance issues and feeling like she has a right facial droop. In the ED, patient's NIHSS was 0. MRI showed small foci of restricted diffusion in the Left caudate body, left posterior lentiform nucleus, mid corona radiate, and left mid temporal lobe consistent with L MCA vascular territory. CTH was initially negative, CTA showed stenosis of the right A2 and no perfusion abnormalities. Patient was discharged on ASA and Plavix per CHANCE trial as well as Lipitor.     Denies numbness, vision changes, HA, dizziness.     Not a tenecteplase candidate given sx resolved.   Not a thrombectomy candidate given no LVO.  (13 Mar 2023 18:41)      SUBJECTIVE: No events overnight.  No new neurologic complaints.      aspirin  chewable 81 milliGRAM(s) Oral daily  atorvastatin 80 milliGRAM(s) Oral at bedtime  clopidogrel Tablet 75 milliGRAM(s) Oral daily  enoxaparin Injectable 40 milliGRAM(s) SubCutaneous every 24 hours  influenza   Vaccine 0.5 milliLiter(s) IntraMuscular once      PHYSICAL EXAM:   Vital Signs Last 24 Hrs  T(C): 36.7 (15 Mar 2023 04:00), Max: 36.9 (14 Mar 2023 18:00)  T(F): 98.1 (15 Mar 2023 04:00), Max: 98.4 (14 Mar 2023 18:00)  HR: 66 (15 Mar 2023 06:00) (66 - 84)  BP: 127/75 (15 Mar 2023 06:00) (127/75 - 159/98)  BP(mean): 90 (15 Mar 2023 06:00) (32 - 121)  RR: 15 (15 Mar 2023 06:00) (11 - 19)  SpO2: 99% (15 Mar 2023 06:00) (92% - 100%)    Parameters below as of 15 Mar 2023 06:00  Patient On (Oxygen Delivery Method): room air        General: No acute distress  HEENT: EOM intact, visual fields full  Abdomen: Soft, nontender, nondistended   Extremities: No edema    NEUROLOGICAL EXAM:  Mental status: Awake, alert, oriented x3, no aphasia, no neglect, normal memory   Cranial Nerves: No facial asymmetry, no nystagmus, no dysarthria,  tongue midline  Motor exam: Normal tone, no drift, 5/5 RUE, 5/5 RLE, 5/5 LUE, 5/5 LLE, normal fine finger movements.  Sensation: Intact to light touch   Coordination/ Gait: No dysmetria, INGA intact and symmetric bilaterally    LABS:                        9.4    8.71  )-----------( 347      ( 15 Mar 2023 05:04 )             31.2    03-15    137  |  102  |  18  ----------------------------<  96  3.4<L>   |  25  |  0.78    Ca    9.4      15 Mar 2023 05:04    TPro  8.2  /  Alb  4.2  /  TBili  0.2  /  DBili  x   /  AST  15  /  ALT  10  /  AlkPhos  69  03-13  PT/INR - ( 13 Mar 2023 17:39 )   PT: 11.8 sec;   INR: 1.03 ratio         PTT - ( 13 Mar 2023 17:39 )  PTT:30.7 sec      IMAGING: Reviewed by me.      THE PATIENT WAS SEEN AND EXAMINED BY ME WITH THE HOUSESTAFF AND STROKE TEAM DURING MORNING ROUNDS.     HPI:  46 YO female PMHx HTN, recent infarcts in L MCA territory presenting to [facility] ED as a code stroke for RLE weakness and speech disturbance, LKW 4PM today. Patient was walking in [employer] when she felt these sx. Upon exam, RLE weakness resolved, and patient is a 5/5 in RLE on exam. Patient able to ambulate without difficulty. Patient was in [facility] for speech difficulty and balance issues and feeling like she has a right facial droop. In the ED, patient's NIHSS was 0. MRI showed small foci of restricted diffusion in the Left caudate body, left posterior lentiform nucleus, mid corona radiate, and left mid temporal lobe consistent with L MCA vascular territory. CTH was initially negative, CTA showed stenosis of the right A2 and no perfusion abnormalities. Patient was discharged on ASA and Plavix per CHANCE trial as well as Lipitor.   Not a tenecteplase candidate given sx resolved.   Not a thrombectomy candidate given no LVO.     SUBJECTIVE: No events overnight.  No new neurologic complaints.      aspirin  chewable 81 milliGRAM(s) Oral daily  atorvastatin 80 milliGRAM(s) Oral at bedtime  clopidogrel Tablet 75 milliGRAM(s) Oral daily  enoxaparin Injectable 40 milliGRAM(s) SubCutaneous every 24 hours  influenza   Vaccine 0.5 milliLiter(s) IntraMuscular once    PHYSICAL EXAM:   Vital Signs Last 24 Hrs  T(C): 36.7 (15 Mar 2023 04:00), Max: 36.9 (14 Mar 2023 18:00)  T(F): 98.1 (15 Mar 2023 04:00), Max: 98.4 (14 Mar 2023 18:00)  HR: 66 (15 Mar 2023 06:00) (66 - 84)  BP: 127/75 (15 Mar 2023 06:00) (127/75 - 159/98)  BP(mean): 90 (15 Mar 2023 06:00) (32 - 121)  RR: 15 (15 Mar 2023 06:00) (11 - 19)  SpO2: 99% (15 Mar 2023 06:00) (92% - 100%)    General: No acute distress  HEENT: EOM intact, visual fields full  Abdomen: Soft, nontender, nondistended   Extremities: No edema    NEUROLOGICAL EXAM:  Mental status: Awake, alert, oriented x3, no aphasia, no neglect, normal memory   Cranial Nerves: No facial asymmetry, no nystagmus, no dysarthria,  tongue midline  Motor exam: Normal tone, no drift, 5/5 RUE, 5/5 RLE, 5/5 LUE, 5/5 LLE, normal fine finger movements.  Sensation: Intact to light touch   Coordination/ Gait: No dysmetria, INGA intact and symmetric bilaterally    LABS:                        9.4    8.71  )-----------( 347      ( 15 Mar 2023 05:04 )             31.2    03-15    137  |  102  |  18  ----------------------------<  96  3.4<L>   |  25  |  0.78    Ca    9.4      15 Mar 2023 05:04    TPro  8.2  /  Alb  4.2  /  TBili  0.2  /  DBili  x   /  AST  15  /  ALT  10  /  AlkPhos  69  03-13  PT/INR - ( 13 Mar 2023 17:39 )   PT: 11.8 sec;   INR: 1.03 ratio         PTT - ( 13 Mar 2023 17:39 )  PTT:30.7 sec      IMAGING: Reviewed by me.      THE PATIENT WAS SEEN AND EXAMINED BY ME WITH THE HOUSESTAFF AND STROKE TEAM DURING MORNING ROUNDS.     HPI:  46 YO female PMHx HTN, recent infarcts in L MCA territory presenting to [facility] ED as a code stroke for RLE weakness and speech disturbance, LKW 4PM today. Patient was walking in [employer] when she felt these sx. Upon exam, RLE weakness resolved, and patient is a 5/5 in RLE on exam. Patient able to ambulate without difficulty. Patient was [facility] for speech difficulty and balance issues and feeling like she has a right facial droop. In the ED, patient's NIHSS was 0. MRI showed small foci of restricted diffusion in the Left caudate body, left posterior lentiform nucleus, mid corona radiate, and left mid temporal lobe consistent with L MCA vascular territory. CTH was initially negative, CTA showed stenosis of the right A2 and no perfusion abnormalities. Patient was discharged on ASA and Plavix per CHANCE trial as well as Lipitor.   Not a tenecteplase candidate given sx resolved.   Not a thrombectomy candidate given no LVO.     SUBJECTIVE: No events overnight.  No new neurologic complaints.      aspirin  chewable 81 milliGRAM(s) Oral daily  atorvastatin 80 milliGRAM(s) Oral at bedtime  clopidogrel Tablet 75 milliGRAM(s) Oral daily  enoxaparin Injectable 40 milliGRAM(s) SubCutaneous every 24 hours  influenza   Vaccine 0.5 milliLiter(s) IntraMuscular once    PHYSICAL EXAM:   Vital Signs Last 24 Hrs  T(C): 36.7 (15 Mar 2023 04:00), Max: 36.9 (14 Mar 2023 18:00)  T(F): 98.1 (15 Mar 2023 04:00), Max: 98.4 (14 Mar 2023 18:00)  HR: 66 (15 Mar 2023 06:00) (66 - 84)  BP: 127/75 (15 Mar 2023 06:00) (127/75 - 159/98)  BP(mean): 90 (15 Mar 2023 06:00) (32 - 121)  RR: 15 (15 Mar 2023 06:00) (11 - 19)  SpO2: 99% (15 Mar 2023 06:00) (92% - 100%)    General: No acute distress  HEENT: EOM intact, visual fields full  Abdomen: Soft, nontender, nondistended   Extremities: No edema    NEUROLOGICAL EXAM:  Mental status: Awake, alert, oriented x3, no aphasia, no neglect, normal memory   Cranial Nerves: No facial asymmetry, no nystagmus, no dysarthria,  tongue midline  Motor exam: Normal tone, no drift, 5/5 RUE, 5/5 RLE, 5/5 LUE, 5/5 LLE, normal fine finger movements.  Sensation: Intact to light touch   Coordination/ Gait: No dysmetria, INGA intact and symmetric bilaterally    LABS:                        9.4    8.71  )-----------( 347      ( 15 Mar 2023 05:04 )             31.2    03-15    137  |  102  |  18  ----------------------------<  96  3.4<L>   |  25  |  0.78    Ca    9.4      15 Mar 2023 05:04    TPro  8.2  /  Alb  4.2  /  TBili  0.2  /  DBili  x   /  AST  15  /  ALT  10  /  AlkPhos  69  03-13  PT/INR - ( 13 Mar 2023 17:39 )   PT: 11.8 sec;   INR: 1.03 ratio         PTT - ( 13 Mar 2023 17:39 )  PTT:30.7 sec    IMAGING: Reviewed by me.     TTE 3/14/23:   Mitral Valve: Normal mitral valve. Minimal mitral  regurgitation.  Aortic Valve/Aorta: Normal aortic valve.  Normal aortic root size.  Left Atrium: Mildly dilated left atrium.  Left Ventricle: Normal left ventricular internal dimensions  and wall thicknesses.  Hyperdynamic left ventricle.  Strain Imaging done on [employer].  Averge GLS = -20.7% with an average HR of 57 bpm.  Normal diastolic function.  Right Heart: Normal right atrium. Normal right ventricular  size and function.  Normal tricuspid valve. Minimal tricuspid regurgitation.  Normal pulmonic valve.  Pericardium/Pleura: Normal pericardium with no pericardial  effusion.  Hemodynamic: IVC is normal. Pulmonary artery pressure is  normal.    MR Pelvis 3/14/23:   1.  No evidence of bony metastatic disease.    2.  Enlarged uterus with suspected adenomyosis and/or fibroids versus   possibly thickening of the endometrium. Advise correlation with   pre/menopausal status and pelvis ultrasound.    3.  Trace bilateral greater trochanteric bursitis.    3/14/23 MRI Head:  New small right temporal lobe infarct. Stable to improved   small left MCA territory infarctions.    CT A/P 3/14/23:   No CT evidence of occult malignancy in the chest, abdomen and pelvis.    Stroke Perfusion: 3/13/23  CTA Neck: No flow-limiting stenosis in the carotid or vertebral arteries.    CTA Head: No large vessel occlusion about the [place]. Short   focal stenosis of the mid A2 segment of the right CELESTINE.    CT Perfusion: Perfusion abnormalities throughout the bilateral cerebri   and left cerebellum, most suggestive of artifact given the distribution   of abnormality.    Other: Redemonstrated 8 mm thyroid nodule.

## 2023-03-15 NOTE — PRE-ANESTHESIA EVALUATION ADULT - NSANTHOSAYNRD_GEN_A_CORE
No. GAYATRI screening performed.  STOP BANG Legend: 0-2 = LOW Risk; 3-4 = INTERMEDIATE Risk; 5-8 = HIGH Risk

## 2023-03-16 ENCOUNTER — TRANSCRIPTION ENCOUNTER (OUTPATIENT)
Age: 48
End: 2023-03-16

## 2023-03-16 VITALS
DIASTOLIC BLOOD PRESSURE: 76 MMHG | RESPIRATION RATE: 16 BRPM | OXYGEN SATURATION: 100 % | SYSTOLIC BLOOD PRESSURE: 127 MMHG | HEART RATE: 72 BPM

## 2023-03-16 DIAGNOSIS — I63.9 CEREBRAL INFARCTION, UNSPECIFIED: ICD-10-CM

## 2023-03-16 LAB
ANION GAP SERPL CALC-SCNC: 11 MMOL/L — SIGNIFICANT CHANGE UP (ref 5–17)
B2 GLYCOPROT1 AB SER QL: NEGATIVE — SIGNIFICANT CHANGE UP
BUN SERPL-MCNC: 14 MG/DL — SIGNIFICANT CHANGE UP (ref 7–23)
CALCIUM SERPL-MCNC: 9.2 MG/DL — SIGNIFICANT CHANGE UP (ref 8.4–10.5)
CHLORIDE SERPL-SCNC: 104 MMOL/L — SIGNIFICANT CHANGE UP (ref 96–108)
CO2 SERPL-SCNC: 25 MMOL/L — SIGNIFICANT CHANGE UP (ref 22–31)
CREAT SERPL-MCNC: 0.74 MG/DL — SIGNIFICANT CHANGE UP (ref 0.5–1.3)
DRVVT RATIO: 1.05 RATIO — SIGNIFICANT CHANGE UP (ref 0–1.21)
DRVVT SCREEN TO CONFIRM RATIO: SIGNIFICANT CHANGE UP
EGFR: 100 ML/MIN/1.73M2 — SIGNIFICANT CHANGE UP
GLUCOSE SERPL-MCNC: 83 MG/DL — SIGNIFICANT CHANGE UP (ref 70–99)
HCT VFR BLD CALC: 30.6 % — LOW (ref 34.5–45)
HGB BLD-MCNC: 9.2 G/DL — LOW (ref 11.5–15.5)
MCHC RBC-ENTMCNC: 22.1 PG — LOW (ref 27–34)
MCHC RBC-ENTMCNC: 30.1 GM/DL — LOW (ref 32–36)
MCV RBC AUTO: 73.6 FL — LOW (ref 80–100)
NORMALIZED SCT PPP-RTO: 0.93 RATIO — SIGNIFICANT CHANGE UP (ref 0–1.16)
NORMALIZED SCT PPP-RTO: SIGNIFICANT CHANGE UP
NRBC # BLD: 0 /100 WBCS — SIGNIFICANT CHANGE UP (ref 0–0)
PLATELET # BLD AUTO: 350 K/UL — SIGNIFICANT CHANGE UP (ref 150–400)
POTASSIUM SERPL-MCNC: 3.3 MMOL/L — LOW (ref 3.5–5.3)
POTASSIUM SERPL-SCNC: 3.3 MMOL/L — LOW (ref 3.5–5.3)
RBC # BLD: 4.16 M/UL — SIGNIFICANT CHANGE UP (ref 3.8–5.2)
RBC # FLD: 19.9 % — HIGH (ref 10.3–14.5)
SODIUM SERPL-SCNC: 140 MMOL/L — SIGNIFICANT CHANGE UP (ref 135–145)
WBC # BLD: 9.21 K/UL — SIGNIFICANT CHANGE UP (ref 3.8–10.5)
WBC # FLD AUTO: 9.21 K/UL — SIGNIFICANT CHANGE UP (ref 3.8–10.5)

## 2023-03-16 PROCEDURE — 84484 ASSAY OF TROPONIN QUANT: CPT

## 2023-03-16 PROCEDURE — 97110 THERAPEUTIC EXERCISES: CPT

## 2023-03-16 PROCEDURE — 85025 COMPLETE CBC W/AUTO DIFF WBC: CPT

## 2023-03-16 PROCEDURE — 72197 MRI PELVIS W/O & W/DYE: CPT

## 2023-03-16 PROCEDURE — 81240 F2 GENE: CPT

## 2023-03-16 PROCEDURE — 82962 GLUCOSE BLOOD TEST: CPT

## 2023-03-16 PROCEDURE — 82435 ASSAY OF BLOOD CHLORIDE: CPT

## 2023-03-16 PROCEDURE — 85303 CLOT INHIBIT PROT C ACTIVITY: CPT

## 2023-03-16 PROCEDURE — 93306 TTE W/DOPPLER COMPLETE: CPT

## 2023-03-16 PROCEDURE — 84132 ASSAY OF SERUM POTASSIUM: CPT

## 2023-03-16 PROCEDURE — 82947 ASSAY GLUCOSE BLOOD QUANT: CPT

## 2023-03-16 PROCEDURE — 85018 HEMOGLOBIN: CPT

## 2023-03-16 PROCEDURE — 93312 ECHO TRANSESOPHAGEAL: CPT

## 2023-03-16 PROCEDURE — 85300 ANTITHROMBIN III ACTIVITY: CPT

## 2023-03-16 PROCEDURE — 81291 MTHFR GENE: CPT

## 2023-03-16 PROCEDURE — 83605 ASSAY OF LACTIC ACID: CPT

## 2023-03-16 PROCEDURE — 80053 COMPREHEN METABOLIC PANEL: CPT

## 2023-03-16 PROCEDURE — 70450 CT HEAD/BRAIN W/O DYE: CPT | Mod: MA

## 2023-03-16 PROCEDURE — 86146 BETA-2 GLYCOPROTEIN ANTIBODY: CPT

## 2023-03-16 PROCEDURE — 82330 ASSAY OF CALCIUM: CPT

## 2023-03-16 PROCEDURE — 97116 GAIT TRAINING THERAPY: CPT

## 2023-03-16 PROCEDURE — 97161 PT EVAL LOW COMPLEX 20 MIN: CPT

## 2023-03-16 PROCEDURE — 85613 RUSSELL VIPER VENOM DILUTED: CPT

## 2023-03-16 PROCEDURE — 99233 SBSQ HOSP IP/OBS HIGH 50: CPT

## 2023-03-16 PROCEDURE — 85027 COMPLETE CBC AUTOMATED: CPT

## 2023-03-16 PROCEDURE — 87637 SARSCOV2&INF A&B&RSV AMP PRB: CPT

## 2023-03-16 PROCEDURE — C1764: CPT

## 2023-03-16 PROCEDURE — 74177 CT ABD & PELVIS W/CONTRAST: CPT

## 2023-03-16 PROCEDURE — 85014 HEMATOCRIT: CPT

## 2023-03-16 PROCEDURE — 93005 ELECTROCARDIOGRAM TRACING: CPT

## 2023-03-16 PROCEDURE — 82803 BLOOD GASES ANY COMBINATION: CPT

## 2023-03-16 PROCEDURE — 0042T: CPT | Mod: MA

## 2023-03-16 PROCEDURE — 70544 MR ANGIOGRAPHY HEAD W/O DYE: CPT

## 2023-03-16 PROCEDURE — 85307 ASSAY ACTIVATED PROTEIN C: CPT

## 2023-03-16 PROCEDURE — 84295 ASSAY OF SERUM SODIUM: CPT

## 2023-03-16 PROCEDURE — 85610 PROTHROMBIN TIME: CPT

## 2023-03-16 PROCEDURE — 97165 OT EVAL LOW COMPLEX 30 MIN: CPT

## 2023-03-16 PROCEDURE — 85576 BLOOD PLATELET AGGREGATION: CPT

## 2023-03-16 PROCEDURE — 86140 C-REACTIVE PROTEIN: CPT

## 2023-03-16 PROCEDURE — 81241 F5 GENE: CPT

## 2023-03-16 PROCEDURE — 85730 THROMBOPLASTIN TIME PARTIAL: CPT

## 2023-03-16 PROCEDURE — 93356 MYOCRD STRAIN IMG SPCKL TRCK: CPT

## 2023-03-16 PROCEDURE — A9585: CPT

## 2023-03-16 PROCEDURE — 70553 MRI BRAIN STEM W/O & W/DYE: CPT

## 2023-03-16 PROCEDURE — 95816 EEG AWAKE AND DROWSY: CPT

## 2023-03-16 PROCEDURE — 80048 BASIC METABOLIC PNL TOTAL CA: CPT

## 2023-03-16 PROCEDURE — 85652 RBC SED RATE AUTOMATED: CPT

## 2023-03-16 PROCEDURE — 70551 MRI BRAIN STEM W/O DYE: CPT

## 2023-03-16 PROCEDURE — 71260 CT THORAX DX C+: CPT

## 2023-03-16 PROCEDURE — 93970 EXTREMITY STUDY: CPT

## 2023-03-16 PROCEDURE — 99285 EMERGENCY DEPT VISIT HI MDM: CPT

## 2023-03-16 PROCEDURE — 70496 CT ANGIOGRAPHY HEAD: CPT | Mod: MA

## 2023-03-16 PROCEDURE — 83090 ASSAY OF HOMOCYSTEINE: CPT

## 2023-03-16 PROCEDURE — 33208 INSRT HEART PM ATRIAL & VENT: CPT

## 2023-03-16 PROCEDURE — 81025 URINE PREGNANCY TEST: CPT

## 2023-03-16 PROCEDURE — 86147 CARDIOLIPIN ANTIBODY EA IG: CPT

## 2023-03-16 PROCEDURE — 70498 CT ANGIOGRAPHY NECK: CPT | Mod: MA

## 2023-03-16 PROCEDURE — 85306 CLOT INHIBIT PROT S FREE: CPT

## 2023-03-16 RX ORDER — AMLODIPINE AND VALSARTAN 5; 320 MG/1; MG/1
1 TABLET, FILM COATED ORAL
Qty: 0 | Refills: 0 | DISCHARGE

## 2023-03-16 RX ORDER — POTASSIUM CHLORIDE 20 MEQ
40 PACKET (EA) ORAL ONCE
Refills: 0 | Status: COMPLETED | OUTPATIENT
Start: 2023-03-16 | End: 2023-03-16

## 2023-03-16 RX ORDER — ASPIRIN/CALCIUM CARB/MAGNESIUM 324 MG
1 TABLET ORAL
Qty: 90 | Refills: 3
Start: 2023-03-16 | End: 2024-03-09

## 2023-03-16 RX ORDER — CLOPIDOGREL BISULFATE 75 MG/1
1 TABLET, FILM COATED ORAL
Qty: 21 | Refills: 0
Start: 2023-03-16 | End: 2023-04-05

## 2023-03-16 RX ORDER — ATORVASTATIN CALCIUM 80 MG/1
1 TABLET, FILM COATED ORAL
Qty: 90 | Refills: 3
Start: 2023-03-16 | End: 2024-03-09

## 2023-03-16 RX ADMIN — Medication 81 MILLIGRAM(S): at 11:17

## 2023-03-16 RX ADMIN — Medication 40 MILLIEQUIVALENT(S): at 11:17

## 2023-03-16 NOTE — PROGRESS NOTE ADULT - ASSESSMENT
46 y/o female PMHx HTN, recent infarcts in L MCA territory presenting to [facility] ED as a code stroke for RLE weakness and speech disturbance, LKW 4PM today. Patient was walking in [employer] when she felt these sx. Upon exam, RLE weakness resolved, and patient is a 5/5 in RLE on exam. Patient able to ambulate without difficulty.

## 2023-03-16 NOTE — DISCHARGE NOTE PROVIDER - CARE PROVIDER_API CALL
Bruce Thompson (DO)  Cardiology; Internal Medicine  [address], [address]  [place] [address]  Phone: [phone]  Fax: [phone]  Follow Up Time: 2 weeks    Abigail Marrero (NP; RN)  NP in [facility]  [address] [place] [address], [address]  [place] [address]  Phone: [phone]  Fax: [phone]  Follow Up Time: 2 weeks

## 2023-03-16 NOTE — DISCHARGE NOTE NURSING/CASE MANAGEMENT/SOCIAL WORK - NSDCFUADDAPPT_GEN_ALL_CORE_FT
Outpatient Lumbar Puncture on Tuesday, 03/21 9:30am, should arrive at the hospital, [address], entrance 3 outpatient registration on 1 st floor at 8:45am. Will need someone to drive you home from appt.

## 2023-03-16 NOTE — PROGRESS NOTE ADULT - CRITICAL CARE SERVICES PROVIDED
Problem: Falls - Risk of:  Goal: Will remain free from falls  Description: Will remain free from falls  3/7/2022 2212 by Anthony Hull RN  Outcome: Ongoing  Note: No falls noted this shift. Continue falling star program. Bed alarm on, bed in low position. Call light and personal belongings in reach. Patient uses call light appropriately. 3/7/2022 1108 by Cesilia Christianson RN  Outcome: Ongoing  Goal: Absence of physical injury  Description: Absence of physical injury  Outcome: Ongoing     Problem: Pain:  Goal: Pain level will decrease  Description: Pain level will decrease  3/7/2022 2212 by Anthony Hull RN  Outcome: Ongoing  Note: Patient complaining of pain this shift. Prn pain medication available. Will monitor. 3/7/2022 1108 by Cesilia Christianson RN  Outcome: Ongoing  Goal: Control of acute pain  Description: Control of acute pain  Outcome: Ongoing  Goal: Control of chronic pain  Description: Control of chronic pain  Outcome: Ongoing     Problem: Skin Integrity:  Goal: Will show no infection signs and symptoms  Description: Will show no infection signs and symptoms  Outcome: Ongoing  Goal: Absence of new skin breakdown  Description: Absence of new skin breakdown  Outcome: Ongoing  Note: No new skin break down noted at this time. Encouraged patient to reposition self in bed. Care plan reviewed with patient. Patient verbalizes understanding of plan of care and contributes to goal setting. Patient is critically ill, requiring critical care services.

## 2023-03-16 NOTE — DISCHARGE NOTE PROVIDER - HOSPITAL COURSE
46 YO female PMHx HTN, recent infarcts in L MCA territory presented to [facility] ED as a code stroke for RLE weakness and speech disturbance, LKW 4PM on day of admission. Patient was walking in [employer] when she felt these sx. Upon exam, RLE weakness resolved. Patient was able to ambulate without difficulty. Patient was in [facility] for speech difficulty and balance issues and feeling like she has a right facial droop. In the ED, patient's NIHSS was 0. MRI showed small foci of restricted diffusion in the Left caudate body, left posterior lentiform nucleus, mid corona radiate, and left mid temporal lobe consistent with L MCA vascular territory. CTH was initially negative, CTA showed stenosis of the right A2 and no perfusion abnormalities. Patient was discharged on ASA and Plavix per CHANCE trial as well as Lipitor. Not a tenecteplase candidate given sx resolved.   Not a thrombectomy candidate given no LVO.     TTE 3/14/23:   Mitral Valve: Normal mitral valve. Minimal mitral  regurgitation.  Aortic Valve/Aorta: Normal aortic valve.  Normal aortic root size.  Left Atrium: Mildly dilated left atrium.  Left Ventricle: Normal left ventricular internal dimensions  and wall thicknesses.  Hyperdynamic left ventricle.  Strain Imaging done on [employer].  Averge GLS = -20.7% with an average HR of 57 bpm.  Normal diastolic function.  Right Heart: Normal right atrium. Normal right ventricular  size and function.  Normal tricuspid valve. Minimal tricuspid regurgitation.  Normal pulmonic valve.  Pericardium/Pleura: Normal pericardium with no pericardial  effusion.  Hemodynamic: IVC is normal. Pulmonary artery pressure is  normal.    MR Pelvis 3/14/23:   1.  No evidence of bony metastatic disease.  2.  Enlarged uterus with suspected adenomyosis and/or fibroids versus   possibly thickening of the endometrium. Advise correlation with   pre/menopausal status and pelvis ultrasound.  3.  Trace bilateral greater trochanteric bursitis.    3/14/23 MRI Head:  New small right temporal lobe infarct. Stable to improved   small left MCA territory infarctions.    CT A/P 3/14/23:   No CT evidence of occult malignancy in the chest, abdomen and pelvis.    Stroke Perfusion: 3/13/23  CTA Neck: No flow-limiting stenosis in the carotid or vertebral arteries.    CTA Head: No large vessel occlusion about the [place]. Short   focal stenosis of the mid A2 segment of the right CELESTINE.    CT Perfusion: Perfusion abnormalities throughout the bilateral cerebri   and left cerebellum, most suggestive of artifact given the distribution   of abnormality.    Other: Redemonstrated 8 mm thyroid nodule.    Routine EEG (3/16): EEG Classification / Summary:  Normal EEG in the awake, drowsy, and asleep states. No focal or epileptiform abnormalities are captured.   Clinical Impression:  A normal EEG neither refutes nor supports a diagnosis of epilepsy.    Impression: Embolic ischemic infarcts - bi hemispheric w/ hypertensive emergency. Low suspicion for inflammatory/demyelinating process.    PLAN:  -Stable for discharge to home  -Continue Aspirin 81mg daily oral  -Continue Atorvastatin 80mg daily oral  -Follow-up as outpatient with cardiologist: Dr. Bruce Thompson  -Follow-up as outpatient with stroke provider: Abigail Marrero NP   48 YO female PMHx HTN, recent infarcts in L MCA territory presented to [facility] ED as a code stroke for RLE weakness and speech disturbance, LKW 4PM on day of admission. Patient was walking in [employer] when she felt these sx. Upon exam, RLE weakness resolved. Patient was able to ambulate without difficulty. Patient was in [facility] for speech difficulty and balance issues and feeling like she has a right facial droop. In the ED, patient's NIHSS was 0. MRI showed small foci of restricted diffusion in the Left caudate body, left posterior lentiform nucleus, mid corona radiate, and left mid temporal lobe consistent with L MCA vascular territory. CTH was initially negative, CTA showed stenosis of the right A2 and no perfusion abnormalities. Patient was discharged on ASA and Plavix per CHANCE trial as well as Lipitor. Not a tenecteplase candidate given sx resolved. Not a thrombectomy candidate given no LVO.     TTE 3/14/23:   Mitral Valve: Normal mitral valve. Minimal mitral  regurgitation.  Aortic Valve/Aorta: Normal aortic valve.  Normal aortic root size.  Left Atrium: Mildly dilated left atrium.  Left Ventricle: Normal left ventricular internal dimensions  and wall thicknesses.  Hyperdynamic left ventricle.  Strain Imaging done on [employer].  Averge GLS = -20.7% with an average HR of 57 bpm.  Normal diastolic function.  Right Heart: Normal right atrium. Normal right ventricular  size and function.  Normal tricuspid valve. Minimal tricuspid regurgitation.  Normal pulmonic valve.  Pericardium/Pleura: Normal pericardium with no pericardial  effusion.  Hemodynamic: IVC is normal. Pulmonary artery pressure is  normal.    MR Pelvis 3/14/23:   1.  No evidence of bony metastatic disease.  2.  Enlarged uterus with suspected adenomyosis and/or fibroids versus   possibly thickening of the endometrium. Advise correlation with   pre/menopausal status and pelvis ultrasound.  3.  Trace bilateral greater trochanteric bursitis.    3/14/23 MRI Head:  New small right temporal lobe infarct. Stable to improved   small left MCA territory infarctions.    CT A/P 3/14/23:   No CT evidence of occult malignancy in the chest, abdomen and pelvis.    Stroke Perfusion: 3/13/23  CTA Neck: No flow-limiting stenosis in the carotid or vertebral arteries.    CTA Head: No large vessel occlusion about the [place]. Short   focal stenosis of the mid A2 segment of the right CELESTINE.    CT Perfusion: Perfusion abnormalities throughout the bilateral cerebri   and left cerebellum, most suggestive of artifact given the distribution   of abnormality.    Other: Redemonstrated 8 mm thyroid nodule.    Routine EEG (3/16): EEG Classification / Summary:  Normal EEG in the awake, drowsy, and asleep states. No focal or epileptiform abnormalities are captured.   Clinical Impression:  A normal EEG neither refutes nor supports a diagnosis of epilepsy.    Impression: Embolic ischemic infarcts - bi hemispheric w/ hypertensive emergency. Low suspicion for inflammatory/demyelinating process.    PLAN:  -Stable for discharge to home  -Continue Aspirin 81mg daily oral  -Continue Atorvastatin 80mg daily oral  -Follow-up as outpatient with cardiologist: Dr. Bruce Thompson  -Follow-up as outpatient with stroke provider: Abigail Marrero NP 46 YO female PMHx HTN, recent infarcts in L MCA territory presented to [facility] ED as a code stroke for RLE weakness and speech disturbance, LKW 4PM on day of admission. Patient was walking in [employer] when she felt these sx. Upon exam, RLE weakness resolved. Patient was able to ambulate without difficulty. Patient was in [facility] for speech difficulty and balance issues and feeling like she has a right facial droop. In the ED, patient's NIHSS was 0. MRI showed small foci of restricted diffusion in the Left caudate body, left posterior lentiform nucleus, mid corona radiate, and left mid temporal lobe consistent with L MCA vascular territory. CTH was initially negative, CTA showed stenosis of the right A2 and no perfusion abnormalities. Patient was discharged on ASA and Plavix per CHANCE trial as well as Lipitor. Not a tenecteplase candidate given sx resolved. Not a thrombectomy candidate given no LVO.     TTE 3/14/23:   Mitral Valve: Normal mitral valve. Minimal mitral  regurgitation.  Aortic Valve/Aorta: Normal aortic valve.  Normal aortic root size.  Left Atrium: Mildly dilated left atrium.  Left Ventricle: Normal left ventricular internal dimensions  and wall thicknesses.  Hyperdynamic left ventricle.  Strain Imaging done on [employer].  Averge GLS = -20.7% with an average HR of 57 bpm.  Normal diastolic function.  Right Heart: Normal right atrium. Normal right ventricular  size and function.  Normal tricuspid valve. Minimal tricuspid regurgitation.  Normal pulmonic valve.  Pericardium/Pleura: Normal pericardium with no pericardial  effusion.  Hemodynamic: IVC is normal. Pulmonary artery pressure is  normal.    MR Pelvis 3/14/23:   1.  No evidence of bony metastatic disease.  2.  Enlarged uterus with suspected adenomyosis and/or fibroids versus   possibly thickening of the endometrium. Advise correlation with   pre/menopausal status and pelvis ultrasound.  3.  Trace bilateral greater trochanteric bursitis.    3/14/23 MRI Head:  New small right temporal lobe infarct. Stable to improved   small left MCA territory infarctions.    CT A/P 3/14/23:   No CT evidence of occult malignancy in the chest, abdomen and pelvis.    Stroke Perfusion: 3/13/23  CTA Neck: No flow-limiting stenosis in the carotid or vertebral arteries.    CTA Head: No large vessel occlusion about the [place]. Short   focal stenosis of the mid A2 segment of the right CELESTINE.    CT Perfusion: Perfusion abnormalities throughout the bilateral cerebri   and left cerebellum, most suggestive of artifact given the distribution   of abnormality.    Other: Redemonstrated 8 mm thyroid nodule.    Routine EEG (3/16): EEG Classification / Summary:  Normal EEG in the awake, drowsy, and asleep states. No focal or epileptiform abnormalities are captured.   Clinical Impression:  A normal EEG neither refutes nor supports a diagnosis of epilepsy.    Impression: Embolic ischemic infarcts - bi hemispheric w/ hypertensive emergency. Low suspicion for inflammatory/demyelinating process.    PLAN:  -Stable for discharge to home  -Continue Aspirin 81mg daily oral  -Please start Plavix 75mg daily for 3 weeks (21 days) after obtaining lumbar puncture and okay from radiologist   -Continue Atorvastatin 80mg daily oral  -Follow-up as outpatient with cardiologist: Dr. Bruce Thompson  -Follow-up as outpatient with stroke provider: Abigail Marrero NP 46 YO female PMHx HTN, recent infarcts in L MCA territory presented to [facility] ED as a code stroke for RLE weakness and speech disturbance, LKW 4PM on day of admission. Patient was walking in [employer] when she felt these sx. Upon exam, RLE weakness resolved. Patient was able to ambulate without difficulty. Patient was in [facility] for speech difficulty and balance issues and feeling like she has a right facial droop. In the ED, patient's NIHSS was 0. MRI showed small foci of restricted diffusion in the Left caudate body, left posterior lentiform nucleus, mid corona radiate, and left mid temporal lobe consistent with L MCA vascular territory. CTH was initially negative, CTA showed stenosis of the right A2 and no perfusion abnormalities. Patient was discharged on ASA and Plavix per CHANCE trial as well as Lipitor. Not a tenecteplase candidate given sx resolved. Not a thrombectomy candidate given no LVO.   Imaging:   MRI HEAD with contrast:  1.  BRAIN:   Several small scattered lesions of acute to subacute   infarction, including multiple lesions within the left middle cerebral   arterial distribution and a single lesion within the right temporal lobe,   are unchanged from 3/10/2023. No interval lesion has developed    2.  ANTERIOR CIRCULATION:    Intracranial atherosclerosis most evident   left anterior cerebral artery A2 segment    3. POSTERIOR CIRCULATION:  Intact.    TTE 3/14/23:   Mitral Valve: Normal mitral valve. Minimal mitral  regurgitation.  Aortic Valve/Aorta: Normal aortic valve.  Normal aortic root size.  Left Atrium: Mildly dilated left atrium.  Left Ventricle: Normal left ventricular internal dimensions  and wall thicknesses.  Hyperdynamic left ventricle.  Strain Imaging done on [employer].  Averge GLS = -20.7% with an average HR of 57 bpm.  Normal diastolic function.  Right Heart: Normal right atrium. Normal right ventricular  size and function.  Normal tricuspid valve. Minimal tricuspid regurgitation.  Normal pulmonic valve.  Pericardium/Pleura: Normal pericardium with no pericardial  effusion.  Hemodynamic: IVC is normal. Pulmonary artery pressure is  normal.    MR Pelvis 3/14/23:   1.  No evidence of bony metastatic disease.  2.  Enlarged uterus with suspected adenomyosis and/or fibroids versus   possibly thickening of the endometrium. Advise correlation with   pre/menopausal status and pelvis ultrasound.  3.  Trace bilateral greater trochanteric bursitis.    3/14/23 MRI Head:  New small right temporal lobe infarct. Stable to improved   small left MCA territory infarctions.    CT A/P 3/14/23:   No CT evidence of occult malignancy in the chest, abdomen and pelvis.    Stroke Perfusion: 3/13/23  CTA Neck: No flow-limiting stenosis in the carotid or vertebral arteries.    CTA Head: No large vessel occlusion about the [place]. Short   focal stenosis of the mid A2 segment of the right CELESTINE.    CT Perfusion: Perfusion abnormalities throughout the bilateral cerebri   and left cerebellum, most suggestive of artifact given the distribution   of abnormality.    Other: Redemonstrated 8 mm thyroid nodule.    Routine EEG (3/16): EEG Classification / Summary:  Normal EEG in the awake, drowsy, and asleep states. No focal or epileptiform abnormalities are captured.   Clinical Impression:  A normal EEG neither refutes nor supports a diagnosis of epilepsy.    Impression: Embolic ischemic infarcts - bi hemispheric w/ hypertensive emergency etiology ESUS Low suspicion for inflammatory/demyelinating process.  ILR placed to rule out A. Fib as possible source.     PLAN:  -Stable for discharge to home  -Continue Aspirin 81mg daily oral  -Please start Plavix 75mg daily for 3 weeks (21 days) after obtaining lumbar puncture and okay from radiologist   -Continue Atorvastatin 80mg daily oral  -Plan for outpatient LP, scheduled for Tuesday, 03/21 at 9:30am, orders placed in [employer]  -Follow-up as outpatient with cardiologist: Dr. Bruce Thompson  -Follow-up as outpatient with stroke provider: Abigail Marrero NP

## 2023-03-16 NOTE — DISCHARGE NOTE PROVIDER - NSDCCPCAREPLAN_GEN_ALL_CORE_FT
PRINCIPAL DISCHARGE DIAGNOSIS  Diagnosis: CVA (cerebrovascular accident)  Assessment and Plan of Treatment: Please follow up with neurologist in 1 week. The office will call you to schedule an appointment, if you do not hear from them please call [phone]. Continue taking medications as prescribed. If you were prescribed a statin for your cholesterol please make sure you have your liver enzymes checked with your primary care physician. Monitor your blood pressure. Reduce fat, cholesterol and salt in your diet. Increase intake of fruits and vegetables. Limit alcohol to minimum and do not smoke. You may be at risk for falling, make changes to your home to help you walk easier. Keep up to date on vaccinations.  If you experience any symptoms of facial drooping, slurred speech, arm or leg weakness, severe headache, vision changes or any worsening symptoms, notify provider immediately and return to ER.

## 2023-03-16 NOTE — DISCHARGE NOTE PROVIDER - NSDCMRMEDTOKEN_GEN_ALL_CORE_FT
amLODIPine 5 mg oral tablet: 2 tab(s) orally once a day   ferrous sulfate 325 mg (65 mg elemental iron) oral tablet: 1 tab(s) orally once a day  valsartan 160 mg oral tablet: 1 tab(s) orally once a day   amLODIPine 5 mg oral tablet: 2 tab(s) orally once a day   aspirin 81 mg oral tablet, chewable: 1 tab(s) orally once a day  atorvastatin 80 mg oral tablet: 1 tab(s) orally once a day (at bedtime)  ferrous sulfate 325 mg (65 mg elemental iron) oral tablet: 1 tab(s) orally once a day  Plavix 75 mg oral tablet: -1 tab(s) orally once a day.   -Please start after lumbar puncture performed when okay from radiologist.   valsartan 160 mg oral tablet: 1 tab(s) orally once a day   amLODIPine 5 mg oral tablet: 2 tab(s) orally once a day   aspirin 81 mg oral tablet, chewable: 1 tab(s) orally once a day  atorvastatin 80 mg oral tablet: 1 tab(s) orally once a day (at bedtime)  ferrous sulfate 325 mg (65 mg elemental iron) oral tablet: 1 tab(s) orally once a day  Outpatient Physical Therapy: Dx: Stroke  Plavix 75 mg oral tablet: -1 tab(s) orally once a day.   -Please start after lumbar puncture performed when okay from radiologist.   valsartan 160 mg oral tablet: 1 tab(s) orally once a day

## 2023-03-16 NOTE — DISCHARGE NOTE PROVIDER - NSDCFUADDAPPT_GEN_ALL_CORE_FT
Outpatient Lumbar Puncture on Tuesday, 9:30am, should arrive at the hospital, [address], entrance 3 outpatient registration on 1 st floor at 8:45am. Will need someone to drive you home from appt.  Outpatient Lumbar Puncture on Tuesday, 03/21 9:30am, should arrive at the hospital, [address], entrance 3 outpatient registration on 1 st floor at 8:45am. Will need someone to drive you home from appt.

## 2023-03-16 NOTE — DISCHARGE NOTE PROVIDER - PROVIDER TOKENS
PROVIDER:[TOKEN:[[id number]],FOLLOWUP:[2 weeks]],PROVIDER:[TOKEN:[[id number]],FOLLOWUP:[2 weeks]]

## 2023-03-16 NOTE — PROGRESS NOTE ADULT - PROBLEM SELECTOR PLAN 1
MR H - new small right temporal lobe infarct, stable to improved small left MCA territory infarctions  ASA/Plavix/Statin  TTE - EF 75%, min MR, mod dilated LA, min TR    - s/p DILIA, no PFO  monitor on tele   Hypercoagulable workup

## 2023-03-16 NOTE — CONSULT NOTE ADULT - SUBJECTIVE AND OBJECTIVE BOX
EP Attending  HISTORY OF PRESENT ILLNESS: HPI:  48 y/o female PMHx HTN, recent infarcts in L MCA territory presenting to [facility] ED as a code stroke for RLE weakness and speech disturbance, LKW 4PM today. Patient was walking in [employer] when she felt these sx. Upon exam, RLE weakness resolved, and patient is a 5/5 in RLE on exam. Patient able to ambulate without difficulty.     Patient was in [facility] for speech difficulty and balance issues and feeling like she has a right facial droop. In the ED, patient's NIHSS was 0. MRI showed small foci of restricted diffusion in the Left caudate body, left posterior lentiform nucleus, mid corona radiate, and left mid temporal lobe consistent with L MCA vascular territory. CTH was initially negative, CTA showed stenosis of the right A2 and no perfusion abnormalities. Patient was discharged on ASA and Plavix per CHANCE trial as well as Lipitor.   Denies numbness, vision changes, HA, dizziness.   Not a tenecteplase candidate given sx resolved.   Not a thrombectomy candidate given no LVO.  (13 Mar 2023 18:41)    Ms March presents after 2 acute strokes in opposite sides of the brain within 1 week.  She is a recent start on aspirin, plavix and atorvastatin after the first event.  No history of diabetes, or arrhythmia diagnosis, no history of fainting, palpitations, or unexplained exertional fatigue.  A 10 pt ROS is otherwise negative.    PAST MEDICAL & SURGICAL HISTORY:  HTN (hypertension)  Anemia  H/O ischemic left MCA stroke  H/O dilation and curettage    MEDICATIONS  (STANDING):  aspirin  chewable 81 milliGRAM(s) Oral daily  atorvastatin 80 milliGRAM(s) Oral at bedtime  enoxaparin Injectable 40 milliGRAM(s) SubCutaneous every 24 hours  influenza   Vaccine 0.5 milliLiter(s) IntraMuscular once    Allergies    clindamycin (Swelling (Mild); Urticaria)    Intolerances    FAMILY HISTORY:  No pertinent family history in first degree relatives    Non-contributary for premature coronary disease or sudden cardiac death    SOCIAL HISTORY:    [ x] Non-smoker  [ ] Smoker  [ ] Alcohol    PHYSICAL EXAM:  T(C): 36.9 (03-16-23 @ 08:00), Max: 36.9 (03-15-23 @ 18:00)  HR: 86 (03-16-23 @ 10:00) (65 - 86)  BP: 157/108 (03-16-23 @ 10:00) (111/70 - 157/108)  RR: 15 (03-16-23 @ 10:00) (13 - 23)  SpO2: 100% (03-16-23 @ 10:00) (98% - 100%)  Wt(kg): --    General: Well nourished, no acute distress, alert and oriented x 3  Head: normocephalic, no trauma  Neck: no JVD, no bruit, supple, not enlarged  CV: S1S2, no S3, regular rate, rhythm is SINUS, no murmurs.    Lungs: clear BL, no rales or wheezes  Abdomen: bowel sounds +, soft, nontender, nondistended  Extremities: no clubbing, cyanosis or edema  Neuro: Moves all 4 extremities, sensation intact x 4 extremities  Skin: warm and moist, normal turgor  Psych: Mood and affect are appropriate for circumstances  MSK: normal range of motion and strength x4 extremities.      TELEMETRY: NSR, no AFib	    ECG: NSR 	  Echo:  < from: Transesophageal Echocardiogram (03.14.23 @ 09:54) >  Dimensions:    Normal Values:  LA:            2.0 - 4.0 cm  Ao:     2.9    2.0 - 3.8 cm  SEPTUM:        0.6 - 1.2 cm  PWT:           0.6 - 1.1 cm  LVIDd:         3.0 - 5.6 cm  LVIDs:         1.8 - 4.0 cm  EF (Visual Estimate): 60-65 %  Doppler Peak Velocity (m/sec): AoV=1.9  ------------------------------------------------------------------------  Observations:  Mitral Valve: Normal mitral valve. No mitral regurgitation  seen.  Aortic Valve/Aorta: Normal trileaflet aortic valve. The  peak aortic velocity is increased probably due to a  hyperdynamic left ventricle.  Aortic Annulus: 2.5 cm.  Aortic Root: 2.9 cm.  LVOT diameter: 2.1 cm.   Simple atheroma noted in aortic arch/descending aorta.  Left Atrium: No thrombus appreciated in left atrium and  left atrial appendage.No diastolic reversal in pulmonary  veins.  Left Ventricle: Normal left ventricular systolic function.  No segmental wall motion abnormalities. Visually, LVEF is  60-65%.  No diastolic parameters were obtained.  Right Heart: No RA/RAA clot. Normal right ventricular size  and systolic function. Normal tricuspid valve. Mild  eccentric tricuspid regurgitation. Normal pulmonic valve.  Minimal pulmonic regurgitation.  Pericardium/Pleura: Normal pericardium with no pericardial  effusion.  Hemodynamic: RVSP is at least 12 mmHg (RAP was not  estimated).  Agitated saline injection and color doppler  reveals no evidence of intracardiac or transpulmonic  shunting.    < end of copied text >  	  LABS:	 	                          9.2    9.21  )-----------( 350      ( 16 Mar 2023 06:30 )             30.6     03-16    140  |  104  |  14  ----------------------------<  83  3.3<L>   |  25  |  0.74    Ca    9.2      16 Mar 2023 06:32    ASSESSMENT/PLAN: Ms Barroso is a very pleasant 47y Female with history of hypertension. She presents with second stroke within 1 week, and is a recent start on secondary prevention medications.    HTN - permissive BP allowed in the acute setting.  Continue aspirin, plavix and atorvastatin for secondary stroke prevention.  She has a history of HTN, no diabetes on insulin, as well as the aforementioned recent stroke.  Her echocardiogram is reassuring, normal EF, normal valves, and no patent foramen ovale.  Normal head and neck vasculature.  Neurology note suggests that her infarct may be embolic due to the size/location, and contributory arrhythmias should be ruled out.  She is referred for a loop recorder specifically for surveillance for paroxysmal atrial fibrillation after an embolic stroke of unknown source.  A repeat stroke from AFib has a high likelihood of being disabling or fatal, and identifying this arrhythmia would change medical management by allowing us to start oral anticoagulation (which she is willing to take).  There is less than 1% risk of infection for this bedside minor surgery.  She prefers this pathway over an event monitor, which would require daily wear for ~30 days at a time.  We will return to bedside later to proceed with implant, see separate operative report.  Afterwards OK for discharge immediately.  Wound check with Dr Thompson.    Wilton Garcia M.D.  Cardiac Electrophysiology    office [phone]  pager [phone]

## 2023-03-16 NOTE — DISCHARGE NOTE NURSING/CASE MANAGEMENT/SOCIAL WORK - PATIENT PORTAL LINK FT
You can access the FollowMyHealth Patient Portal offered by [facility] by registering at the following website: [email or web address]. By joining [facility]’s FollowMyHealth portal, you will also be able to view your health information using other applications (apps) compatible with our system.

## 2023-03-16 NOTE — PROGRESS NOTE ADULT - ASSESSMENT
48 YO female PMHx HTN, recent infarcts in L MCA territory presented to [facility] ED as a code stroke for RLE weakness and speech disturbance, LKW 4PM on 3/13. Patient was walking in [employer] when she felt these sx. Patient was in [facility] for speech difficulty and balance issues and feeling like she has a right facial droop. In the ED, patient's NIHSS was 0. MRI showed small foci of restricted diffusion in the Left caudate body, left posterior lentiform nucleus, mid corona radiate, and left mid temporal lobe consistent with L MCA vascular territory. CTH was initially negative, CTA showed stenosis of the right A2 and no perfusion abnormalities. Patient was discharged on ASA and Plavix per CHANCE trial as well as Lipitor.     Impression:  Embolic ischemic infarcts - bi hemispheric   Hypertensive emergency  Although unlikely, inflammatory/demyelinating process–therefore will need MRI brain with and without contrast  Will keep off Plavix for 5 days then proceed with Lumbar puncture to rule out inflammatory etiology    NEURO: Neurologically unchanged, Continue close monitoring for neurologic deterioration. normotension. - Lipitor 80mg. MRI Brain w/o, MRA Head w/o and Neck w/contrast as above. MRA Head and MRI Head ordered. Physical therapy/OT recommended Outpatient PT, No skilled OT needs.     ANTITHROMBOTIC THERAPY: Continue ASA/Plavix for 3 weeks followed by aspirin alone.     PULMONARY: CXR clear, protecting airway, saturating well     CARDIOVASCULAR: Continue cardiac monitoring. TTE 75%, DILIA 60-65% Simple atheroma noted in aortic arch/descending aorta. Mild eccentric tricuspid regurgitation. Plan for ILR to screen for occult arrythmia.                               SBP goal: <160    GASTROINTESTINAL: Passed dysphagia screen       Diet: Regular    RENAL: BUN/Cr stable, good urine output ; hypokalemia- s/p KCl supplement      Na Goal: Greater than 135     Reyes: NO    HEMATOLOGY: H/H without change- anemia, Platelets 350     DVT ppx: Lovenox 40 subq     ID: afebrile, no leukocytosis     OTHER: Updated patient at bedside     DISPOSITION: Outpatient PT. No skilled OT needs.    CORE MEASURES:        Admission NIHSS: 0     TPA: [] YES [x] NO      LDL/HDL: N/A     Depression Screen: PASS     Statin Therapy: YES     Dysphagia Screen: [x] PASS [] FAIL     Smoking [] YES [x] NO      Afib [] YES [x] NO     Stroke Education [x] YES [] NO    Obtain screening lower extremity venous ultrasound in patients who meet 1 or more of the following criteria as patient is high risk for DVT/PE on admission:   [] History of DVT/PE  [] Hypercoagulable states (Factor V Leiden, Cancer, OCP, etc. )  [] Prolonged immobility (hemiplegia/hemiparesis/post operative or any other extended immobilization)  [] Transferred from outside facility (Rehab or Long term care)  [] Age </= to 50   46 YO female PMHx HTN, recent infarcts in L MCA territory presented to [facility] ED as a code stroke for RLE weakness and speech disturbance, LKW 4PM on 3/13. Patient was [place] in [employer] when she felt these sx. Patient was in [facility] for speech difficulty and balance issues and feeling like she has a right facial droop. In the ED, patient's NIHSS was 0. MRI showed small foci of restricted diffusion in the Left caudate body, left posterior lentiform nucleus, mid corona radiate, and left mid temporal lobe consistent with L MCA vascular territory. CTH was initially negative, CTA showed stenosis of the right A2 and no perfusion abnormalities. Patient was discharged on ASA and Plavix per CHANCE trial as well as Lipitor.     Impression:  Embolic ischemic infarcts - bi hemispheric   Hypertensive emergency  Although unlikely, inflammatory/demyelinating process–therefore will need MRI brain with and without contrast  Will keep off Plavix for 5 days then proceed with Lumbar puncture to rule out inflammatory etiology    NEURO: Neurologically unchanged, Continue close monitoring for neurologic deterioration. normotension. - Lipitor 80mg. MRI Brain w/o, MRA Head w/o and Neck w/contrast as above. MRA Head and MRI Head ordered. EEG on 3/14 neg for seizures. Physical therapy/OT recommended Outpatient PT, No skilled OT needs.     ANTITHROMBOTIC THERAPY: Continue ASA/Plavix for 3 weeks followed by aspirin alone.     PULMONARY: CXR clear, protecting airway, saturating well     CARDIOVASCULAR: Continue cardiac monitoring. TTE 75%, DILIA 60-65% Simple atheroma noted in aortic arch/descending aorta. Mild eccentric tricuspid regurgitation. Plan for ILR to screen for occult arrythmia.                               SBP goal: <160    GASTROINTESTINAL: Passed dysphagia screen       Diet: Regular    RENAL: BUN/Cr stable, good urine output ; hypokalemia- s/p KCl supplement      Na Goal: Greater than 135     Reyes: NO    HEMATOLOGY: H/H without change- anemia, Platelets 350; CT C/A/P neg for malignancy. Lupus profile pending, hypercoag panel sent. LE doppler neg for DVT.      DVT ppx: Lovenox 40 subq     ID: afebrile, no leukocytosis     OTHER: Updated patient at bedside     DISPOSITION: Outpatient PT. No skilled OT needs.    CORE MEASURES:        Admission NIHSS: 0     TPA: [] YES [x] NO      LDL/HDL: N/A     Depression Screen: PASS     Statin Therapy: YES     Dysphagia Screen: [x] PASS [] FAIL     Smoking [] YES [x] NO      Afib [] YES [x] NO     Stroke Education [x] YES [] NO    Obtain screening lower extremity venous ultrasound in patients who meet 1 or more of the following criteria as patient is high risk for DVT/PE on admission:   [] History of DVT/PE  [] Hypercoagulable states (Factor V Leiden, Cancer, OCP, etc. )  [] Prolonged immobility (hemiplegia/hemiparesis/post operative or any other extended immobilization)  [] Transferred from outside facility (Rehab or Long term care)  [] Age </= to 50   48 YO female PMHx HTN, recent infarcts in L MCA territory presented to [facility] ED as a code stroke for RLE weakness and speech disturbance, LKW 4PM on 3/13. Patient was walking in [employer] when she felt these sx. Patient was in [facility] for speech difficulty and balance issues and feeling like she has a right facial droop. In the ED, patient's NIHSS was 0. MRI showed small foci of restricted diffusion in the Left caudate body, left posterior lentiform nucleus, mid corona radiate, and left mid temporal lobe consistent with L MCA vascular territory. CTH was initially negative, CTA showed stenosis of the right A2 and no perfusion abnormalities. Patient was discharged on ASA and Plavix per CHANCE trial as well as Lipitor.     Impression:  Embolic ischemic infarcts - bi hemispheric   Hypertensive emergency  Although unlikely, inflammatory/demyelinating process–therefore will need MRI brain with and without contrast    NEURO: Neurologically unchanged, Continue close monitoring for neurologic deterioration. normotension. - Lipitor 80mg. MRI Brain w/o, MRA Head w/o and Neck w/contrast as above. MRA Head and MRI Head ordered. EEG on 3/14 neg for seizures. Physical therapy/OT recommended Outpatient PT, No skilled OT needs.     ANTITHROMBOTIC THERAPY: Continue ASA/Plavix for 3 weeks followed by aspirin alone. Plavix now discontinued as patient has scheduled LP with IR. Will tell patient to restart once deemed safe by IR provider.     PULMONARY: CXR clear, protecting airway, saturating well     CARDIOVASCULAR: Continue cardiac monitoring. TTE 75%, DILIA 60-65% Simple atheroma noted in aortic arch/descending aorta. Mild eccentric tricuspid regurgitation. s/p  ILR to screen for occult arrythmia.                               SBP goal: <160    GASTROINTESTINAL: Passed dysphagia screen       Diet: Regular    RENAL: BUN/Cr stable, good urine output ; hypokalemia- s/p KCl supplement      Na Goal: Greater than 135     Reyes: NO    HEMATOLOGY: H/H without change- anemia, Platelets 350; CT C/A/P neg for malignancy. Lupus profile pending, hypercoag panel sent. LE doppler neg for DVT.      DVT ppx: Lovenox 40 subq     ID: afebrile, no leukocytosis     OTHER: Updated patient at bedside, all questions and concerns addressed. Denied carbon monoxide poisoning, heavy metal exposure.      DISPOSITION: Outpatient PT. No skilled OT needs.    CORE MEASURES:        Admission NIHSS: 0     TPA: [] YES [x] NO      LDL/HDL: N/A     Depression Screen: PASS     Statin Therapy: YES     Dysphagia Screen: [x] PASS [] FAIL     Smoking [] YES [x] NO      Afib [] YES [x] NO     Stroke Education [x] YES [] NO    Obtain screening lower extremity venous ultrasound in patients who meet 1 or more of the following criteria as patient is high risk for DVT/PE on admission:   [] History of DVT/PE  [] Hypercoagulable states (Factor V Leiden, Cancer, OCP, etc. )  [] Prolonged immobility (hemiplegia/hemiparesis/post operative or any other extended immobilization)  [] Transferred from outside facility (Rehab or Long term care)  [] Age </= to 50

## 2023-03-16 NOTE — PROGRESS NOTE ADULT - SUBJECTIVE AND OBJECTIVE BOX
Subjective: Patient seen and examined. No new events except as noted.   remains in Stroke unit   Sr   no cp     REVIEW OF SYSTEMS:    CONSTITUTIONAL: No weakness, fevers or chills  EYES/ENT: No visual changes;  No vertigo or throat pain   NECK: No pain or stiffness  RESPIRATORY: No cough, wheezing, hemoptysis; No shortness of breath  CARDIOVASCULAR: No chest pain or palpitations  GASTROINTESTINAL: No abdominal or epigastric pain. No nausea, vomiting, or hematemesis; No diarrhea or constipation. No melena or hematochezia.  GENITOURINARY: No dysuria, frequency or hematuria  NEUROLOGICAL: No numbness or weakness  SKIN: No itching, burning, rashes, or lesions   All other review of systems is negative unless indicated above.    MEDICATIONS:  MEDICATIONS  (STANDING):  aspirin  chewable 81 milliGRAM(s) Oral daily  atorvastatin 80 milliGRAM(s) Oral at bedtime  enoxaparin Injectable 40 milliGRAM(s) SubCutaneous every 24 hours  influenza   Vaccine 0.5 milliLiter(s) IntraMuscular once  potassium chloride    Tablet ER 40 milliEquivalent(s) Oral once      PHYSICAL EXAM:  T(C): 36.8 (03-16-23 @ 04:00), Max: 36.9 (03-15-23 @ 18:00)  HR: 76 (03-16-23 @ 06:00) (67 - 88)  BP: 118/76 (03-16-23 @ 06:00) (111/57 - 152/89)  RR: 17 (03-16-23 @ 06:00) (15 - 23)  SpO2: 100% (03-16-23 @ 06:00) (96% - 100%)  Wt(kg): --  I&O's Summary        Appearance: Normal	  HEENT: Normal oral mucosa, PERRL, EOMI	  Lymphatic: No lymphadenopathy  Cardiovascular: Normal S1 S2, No JVD, No murmurs, No edema  Respiratory: Lungs clear to auscultation	  Psychiatry: A & O x 3, Mood & affect appropriate  Gastrointestinal:  Soft, Non-tender, + BS	  Skin: No rashes, No ecchymoses, No cyanosis	  NEUROLOGICAL EXAM:  Mental status: Awake, alert, oriented x3, no aphasia, no neglect, normal memory   Cranial Nerves: No facial asymmetry, no nystagmus, no dysarthria,  tongue midline  Motor exam: Normal tone, no drift, 5/5 RUE, 5/5 RLE, 5/5 LUE, 5/5 LLE, normal fine finger movements.  Sensation: Intact to light touch   Coordination/ Gait: No dysmetria, INGA intact and symmetric bilaterally  Vascular: Peripheral pulses palpable 2+ bilaterally      LABS:    CARDIAC MARKERS:                                9.2    9.21  )-----------( 350      ( 16 Mar 2023 06:30 )             30.6     03-16    140  |  104  |  14  ----------------------------<  83  3.3<L>   |  25  |  0.74    Ca    9.2      16 Mar 2023 06:32              TELEMETRY: 	 SR    ECG:  	  RADIOLOGY:   DIAGNOSTIC TESTING:  [ ] Echocardiogram:  < from: Transesophageal Echocardiogram (03.14.23 @ 09:54) >    Patient name: NINO BELL  YOB: 1975   Age: 47 (F)   MR#: [id number]  Study Date: 3/14/2023  Location: [address] [place], [place] [id number]: Alvaro García MD  Study quality: Technically good  Referring Physician: James Castañeda MD  Blood Pressure: 138/81 mmHg  Height: 165 cm  Weight: 84 kg  BSA: 1.9 m2  Heart Rate: 69 mmHg  ------------------------------------------------------------------------  PROCEDURE: Transesophageal and transthoracic  echocardiograms with 2-D, M-Mode and complete spectral and  color flow Doppler were performed.  Informed consent was  first obtained for DILIA. The patient was sedated - see  anesthesia record.  The procedure was monitored with  automatic blood pressure monitoring, ECG tracings and pulse  oximetry.  The transesophageal probe was placed in the  esophagus posterior to the heart without complications.  INDICATION: Cerebral infarction, unspecified (I63.9)  ------------------------------------------------------------------------  Dimensions:    Normal Values:  LA:            2.0 - 4.0 cm  Ao:     2.9    2.0 - 3.8 cm  SEPTUM:        0.6 - 1.2 cm  PWT:           0.6 - 1.1 cm  LVIDd:         3.0 - 5.6 cm  LVIDs:         1.8 - 4.0 cm  EF (Visual Estimate): 60-65 %  Doppler Peak Velocity (m/sec): AoV=1.9  ------------------------------------------------------------------------  Observations:  Mitral Valve: Normal mitral valve. No mitral regurgitation  seen.  Aortic Valve/Aorta: Normal trileaflet aortic valve. The  peak aortic velocity is increased probably due to a  hyperdynamic left ventricle.  Aortic Annulus: 2.5 cm.  Aortic Root: 2.9 cm.  LVOT diameter: 2.1 cm.   Simple atheroma noted in aortic arch/descending aorta.  Left Atrium: No thrombus appreciated in left atrium and  left atrial appendage.No diastolic reversal in pulmonary  veins.  Left Ventricle: Normal left ventricular systolic function.  No segmental wall motion abnormalities. Visually, LVEF is  60-65%.  No diastolic parameters were obtained.  Right Heart: No RA/RAA clot. Normal right ventricular size  and systolic function. Normal tricuspid valve. Mild  eccentric tricuspid regurgitation. Normal pulmonic valve.  Minimal pulmonic regurgitation.  Pericardium/Pleura: Normal pericardium with no pericardial  effusion.  Hemodynamic: RVSP is at least 12 mmHg (RAP was not  estimated).  Agitated saline injection and color doppler  reveals no evidence of intracardiac or transpulmonic  shunting.  ------------------------------------------------------------------------  Conclusions:  1. Normal trileaflet aortic valve. The peak aortic velocity  is increased probably due to a hyperdynamic left ventricle.    2. Aortic Annulus: 2.5 cm.  Aortic Root: 2.9 cm.  LVOT diameter: 2.1 cm.  Simple atheroma noted in aortic arch/descending aorta  (Grade II, measuring < 4 mm)  3. No thrombus appreciated in left atrium and left atrial  appendage. No diastolic reversal in pulmonary veins.  4. Normal left ventricular systolic function. No segmental  wall motion abnormalities. Visually, LVEF is 60-65%.  5. No diastolic parameters were obtained.  6. No RA/RAA clot.  7. Normal right ventricular size and systolic function.  8. Normal tricuspid valve. Mild eccentric tricuspid  regurgitation.  9. Agitated saline injection and color doppler reveals no  evidence of intracardiac or transpulmonic shunting.  10. Normal pericardium with no pericardial effusion.  *** No previous Echo exam.  ------------------------------------------------------------------------  Confirmed on  3/15/2023 - 15:47:18 by Alma Lazar M.D.  ------------------------------------------------------------------------    < end of copied text >    [ ]  Catheterization:  [ ] Stress Test:    OTHER:

## 2023-03-16 NOTE — DISCHARGE NOTE PROVIDER - NSDCFUSCHEDAPPT_GEN_ALL_CORE_FT
Aleks Guevara Physician [facility]  CARDIOLOGY [address]  Scheduled Appointment: 03/24/2023     [facility]  DIAGRAD [address]  Scheduled Appointment: 03/21/2023    Aleks Guevara  [facility]  CARDIOLOGY [address]  Scheduled Appointment: 03/24/2023

## 2023-03-16 NOTE — PROGRESS NOTE ADULT - SUBJECTIVE AND OBJECTIVE BOX
THE PATIENT WAS SEEN AND EXAMINED BY ME WITH THE HOUSESTAFF AND STROKE TEAM DURING MORNING ROUNDS.     HPI:  48 YO female PMHx HTN, recent infarcts in L MCA territory presented to [facility] ED as a code stroke for RLE weakness and speech disturbance, LKW 4PM on day of admission. Patient was walking in [employer] when she felt these sx. Upon exam, RLE weakness resolved,Patient able to ambulate without difficulty. Patient was in [facility] for speech difficulty and balance issues and feeling like she has a right facial droop. In the ED, patient's NIHSS was 0. MRI showed small foci of restricted diffusion in the Left caudate body, left posterior lentiform nucleus, mid corona radiate, and left mid temporal lobe consistent with L MCA vascular territory. CTH was initially negative, CTA showed stenosis of the right A2 and no perfusion abnormalities. Patient was discharged on ASA and Plavix per CHANCE trial as well as Lipitor.   Not a tenecteplase candidate given sx resolved.   Not a thrombectomy candidate given no LVO.     SUBJECTIVE: No events overnight.  No new neurologic complaints.  ROS: otherwise negative.     aspirin  chewable 81 milliGRAM(s) Oral daily  atorvastatin 80 milliGRAM(s) Oral at bedtime  benzocaine/menthol Lozenge 1 Lozenge Oral four times a day PRN  clopidogrel Tablet 75 milliGRAM(s) Oral daily  enoxaparin Injectable 40 milliGRAM(s) SubCutaneous every 24 hours  influenza   Vaccine 0.5 milliLiter(s) IntraMuscular once      PHYSICAL EXAM:   Vital Signs Last 24 Hrs  T(C): 36.8 (16 Mar 2023 04:00), Max: 36.9 (15 Mar 2023 10:30)  T(F): 98.2 (16 Mar 2023 04:00), Max: 98.5 (15 Mar 2023 10:30)  HR: 76 (16 Mar 2023 06:00) (67 - 95)  BP: 118/76 (16 Mar 2023 06:00) (111/57 - 152/89)  BP(mean): 88 (16 Mar 2023 06:00) (82 - 134)  RR: 17 (16 Mar 2023 06:00) (15 - 23)  SpO2: 100% (16 Mar 2023 06:00) (96% - 100%)    Parameters below as of 16 Mar 2023 06:00  Patient On (Oxygen Delivery Method): room air        General: No acute distress  HEENT: EOM intact, visual fields full  Abdomen: Soft, nontender, nondistended   Extremities: No edema    NEUROLOGICAL EXAM:  Mental status: Awake, alert, oriented x3, no aphasia, no neglect, normal memory   Cranial Nerves: No facial asymmetry, no nystagmus, no dysarthria,  tongue midline  Motor exam: Normal tone, no drift, 5/5 RUE, 5/5 RLE, 5/5 LUE, 5/5 LLE, normal fine finger movements.  Sensation: Intact to light touch   Coordination/ Gait: No dysmetria, INGA intact and symmetric bilaterally    LABS:                        9.2    9.21  )-----------( 350      ( 16 Mar 2023 06:30 )             30.6    03-16    140  |  104  |  14  ----------------------------<  83  3.3<L>   |  25  |  0.74    Ca    9.2      16 Mar 2023 06:32          IMAGING: Reviewed by me.       TTE 3/14/23:   Mitral Valve: Normal mitral valve. Minimal mitral  regurgitation.  Aortic Valve/Aorta: Normal aortic valve.  Normal aortic root size.  Left Atrium: Mildly dilated left atrium.  Left Ventricle: Normal left ventricular internal dimensions  and wall thicknesses.  Hyperdynamic left ventricle.  Strain Imaging done on [place].  Averge GLS = -20.7% with an average HR of 57 bpm.  Normal diastolic function.  Right Heart: Normal right atrium. Normal right ventricular  size and function.  Normal tricuspid valve. Minimal tricuspid regurgitation.  Normal pulmonic valve.  Pericardium/Pleura: Normal pericardium with no pericardial  effusion.  Hemodynamic: IVC is normal. Pulmonary artery pressure is  normal.    MR Pelvis 3/14/23:   1.  No evidence of bony metastatic disease.    2.  Enlarged uterus with suspected adenomyosis and/or fibroids versus   possibly thickening of the endometrium. Advise correlation with   pre/menopausal status and pelvis ultrasound.    3.  Trace bilateral greater trochanteric bursitis.    3/14/23 MRI Head:  New small right temporal lobe infarct. Stable to improved   small left MCA territory infarctions.    CT A/P 3/14/23:   No CT evidence of occult malignancy in the chest, abdomen and pelvis.    Stroke Perfusion: 3/13/23  CTA Neck: No flow-limiting stenosis in the carotid or vertebral arteries.    CTA Head: No large vessel occlusion about the [place]. Short   focal stenosis of the mid A2 segment of the right CELESTINE.    CT Perfusion: Perfusion abnormalities throughout the bilateral cerebri   and left cerebellum, most suggestive of artifact given the distribution   of abnormality.    Other: Redemonstrated 8 mm thyroid nodule.   THE PATIENT WAS SEEN AND EXAMINED BY ME WITH THE HOUSESTAFF AND STROKE TEAM DURING MORNING ROUNDS.     HPI:  46 YO female PMHx HTN, recent infarcts in L MCA territory presented to [facility] ED as a code stroke for RLE weakness and speech disturbance, LKW 4PM on day of admission. Patient was walking in [employer] when she felt these sx. Upon exam, RLE weakness resolved,Patient able to ambulate without difficulty. Patient was in [facility] for speech difficulty and balance issues and feeling like she has a right facial droop. In the ED, patient's NIHSS was 0. MRI showed small foci of restricted diffusion in the Left caudate body, left posterior lentiform nucleus, mid corona radiate, and left mid temporal lobe consistent with L MCA vascular territory. CTH was initially negative, CTA showed stenosis of the right A2 and no perfusion abnormalities. Patient was discharged on ASA and Plavix per CHANCE trial as well as Lipitor.   Not a tenecteplase candidate given sx resolved.   Not a thrombectomy candidate given no LVO.     SUBJECTIVE: No events overnight.  No new neurologic complaints.  ROS: otherwise negative.     aspirin  chewable 81 milliGRAM(s) Oral daily  atorvastatin 80 milliGRAM(s) Oral at bedtime  benzocaine/menthol Lozenge 1 Lozenge Oral four times a day PRN  clopidogrel Tablet 75 milliGRAM(s) Oral daily  enoxaparin Injectable 40 milliGRAM(s) SubCutaneous every 24 hours  influenza   Vaccine 0.5 milliLiter(s) IntraMuscular once      PHYSICAL EXAM:   Vital Signs Last 24 Hrs  T(C): 36.8 (16 Mar 2023 04:00), Max: 36.9 (15 Mar 2023 10:30)  T(F): 98.2 (16 Mar 2023 04:00), Max: 98.5 (15 Mar 2023 10:30)  HR: 76 (16 Mar 2023 06:00) (67 - 95)  BP: 118/76 (16 Mar 2023 06:00) (111/57 - 152/89)  BP(mean): 88 (16 Mar 2023 06:00) (82 - 134)  RR: 17 (16 Mar 2023 06:00) (15 - 23)  SpO2: 100% (16 Mar 2023 06:00) (96% - 100%)    Parameters below as of 16 Mar 2023 06:00  Patient On (Oxygen Delivery Method): room air        General: No acute distress  HEENT: EOM intact, visual fields full  Abdomen: Soft, nontender, nondistended   Extremities: No edema    NEUROLOGICAL EXAM:  Mental status: Awake, alert, oriented x3, speech fluent, follows commands   Cranial Nerves: No facial asymmetry, no nystagmus, no dysarthria,  tongue midline  Motor exam: Normal tone, no drift, 5/5 RUE, 5/5 RLE, 5/5 LUE, 5/5 LLE  Sensation: Intact to light touch   Coordination/ Gait: not tested     LABS:                        9.2    9.21  )-----------( 350      ( 16 Mar 2023 06:30 )             30.6    03-16    140  |  104  |  14  ----------------------------<  83  3.3<L>   |  25  |  0.74    Ca    9.2      16 Mar 2023 06:32          IMAGING: Reviewed by me.       TTE 3/14/23:   Mitral Valve: Normal mitral valve. Minimal mitral  regurgitation.  Aortic Valve/Aorta: Normal aortic valve.  Normal aortic root size.  Left Atrium: Mildly dilated left atrium.  Left Ventricle: Normal left ventricular internal dimensions  and wall thicknesses.  Hyperdynamic left ventricle.  Strain Imaging done on [place].  Averge GLS = -20.7% with an average HR of 57 bpm.  Normal diastolic function.  Right Heart: Normal right atrium. Normal right ventricular  size and function.  Normal tricuspid valve. Minimal tricuspid regurgitation.  Normal pulmonic valve.  Pericardium/Pleura: Normal pericardium with no pericardial  effusion.  Hemodynamic: IVC is normal. Pulmonary artery pressure is  normal.    MR Pelvis 3/14/23:   1.  No evidence of bony metastatic disease.    2.  Enlarged uterus with suspected adenomyosis and/or fibroids versus   possibly thickening of the endometrium. Advise correlation with   pre/menopausal status and pelvis ultrasound.    3.  Trace bilateral greater trochanteric bursitis.    3/14/23 MRI Head:  New small right temporal lobe infarct. Stable to improved   small left MCA territory infarctions.    CT A/P 3/14/23:   No CT evidence of occult malignancy in the chest, abdomen and pelvis.    Stroke Perfusion: 3/13/23  CTA Neck: No flow-limiting stenosis in the carotid or vertebral arteries.    CTA Head: No large vessel occlusion about the [place] of [place]. Short   focal stenosis of the mid A2 segment of the right CELESTINE.    CT Perfusion: Perfusion abnormalities throughout the bilateral cerebri   and left cerebellum, most suggestive of artifact given the distribution   of abnormality.    Other: Redemonstrated 8 mm thyroid nodule.

## 2023-03-17 PROBLEM — Z86.73 PERSONAL HISTORY OF TRANSIENT ISCHEMIC ATTACK (TIA), AND CEREBRAL INFARCTION WITHOUT RESIDUAL DEFICITS: Chronic | Status: ACTIVE | Noted: 2023-03-13

## 2023-03-17 LAB
APCR PPP: 2.49 RATIO — SIGNIFICANT CHANGE UP
PROT S FREE PPP-ACNC: 75 % — SIGNIFICANT CHANGE UP (ref 63–140)

## 2023-03-20 LAB
DNA PLOIDY SPEC FC-IMP: SIGNIFICANT CHANGE UP
PTR INTERPRETATION: SIGNIFICANT CHANGE UP

## 2023-03-21 ENCOUNTER — RESULT REVIEW (OUTPATIENT)
Age: 48
End: 2023-03-21

## 2023-03-21 ENCOUNTER — APPOINTMENT (OUTPATIENT)
Dept: RADIOLOGY | Facility: HOSPITAL | Age: 48
End: 2023-03-21
Payer: COMMERCIAL

## 2023-03-21 ENCOUNTER — OUTPATIENT (OUTPATIENT)
Dept: OUTPATIENT SERVICES | Facility: HOSPITAL | Age: 48
LOS: 1 days | End: 2023-03-21
Payer: COMMERCIAL

## 2023-03-21 DIAGNOSIS — Z98.89 OTHER SPECIFIED POSTPROCEDURAL STATES: Chronic | ICD-10-CM

## 2023-03-21 DIAGNOSIS — I63.9 CEREBRAL INFARCTION, UNSPECIFIED: ICD-10-CM

## 2023-03-21 PROCEDURE — 84157 ASSAY OF PROTEIN OTHER: CPT

## 2023-03-21 PROCEDURE — 87483 CNS DNA AMP PROBE TYPE 12-25: CPT

## 2023-03-21 PROCEDURE — 82040 ASSAY OF SERUM ALBUMIN: CPT

## 2023-03-21 PROCEDURE — 82784 ASSAY IGA/IGD/IGG/IGM EACH: CPT

## 2023-03-21 PROCEDURE — 86053 AQAPRN-4 ANTB FLO CYTMTRY EA: CPT

## 2023-03-21 PROCEDURE — 62328 DX LMBR SPI PNXR W/FLUOR/CT: CPT

## 2023-03-21 PROCEDURE — 87102 FUNGUS ISOLATION CULTURE: CPT

## 2023-03-21 PROCEDURE — 82042 OTHER SOURCE ALBUMIN QUAN EA: CPT

## 2023-03-21 PROCEDURE — 82164 ANGIOTENSIN I ENZYME TEST: CPT

## 2023-03-21 PROCEDURE — 89051 BODY FLUID CELL COUNT: CPT

## 2023-03-21 PROCEDURE — 86592 SYPHILIS TEST NON-TREP QUAL: CPT

## 2023-03-21 PROCEDURE — 87205 SMEAR GRAM STAIN: CPT

## 2023-03-21 PROCEDURE — 82945 GLUCOSE OTHER FLUID: CPT

## 2023-03-21 PROCEDURE — 86789 WEST NILE VIRUS ANTIBODY: CPT

## 2023-03-21 PROCEDURE — 86403 PARTICLE AGGLUT ANTBDY SCRN: CPT

## 2023-03-21 PROCEDURE — 82232 ASSAY OF BETA-2 PROTEIN: CPT

## 2023-03-21 PROCEDURE — 86788 WEST NILE VIRUS AB IGM: CPT

## 2023-03-21 PROCEDURE — 83615 LACTATE (LD) (LDH) ENZYME: CPT

## 2023-03-21 PROCEDURE — 87070 CULTURE OTHR SPECIMN AEROBIC: CPT

## 2023-03-21 PROCEDURE — 83605 ASSAY OF LACTIC ACID: CPT

## 2023-03-21 PROCEDURE — 86362 MOG-IGG1 ANTB CBA EACH: CPT

## 2023-03-21 PROCEDURE — 83873 ASSAY OF CSF PROTEIN: CPT

## 2023-03-21 PROCEDURE — 87116 MYCOBACTERIA CULTURE: CPT

## 2023-03-22 LAB — MOLECULAR GENETICS - MTHFR GENE CASE: SIGNIFICANT CHANGE UP

## 2023-03-24 ENCOUNTER — APPOINTMENT (OUTPATIENT)
Dept: CARDIOLOGY | Facility: CLINIC | Age: 48
End: 2023-03-24

## 2023-03-25 ENCOUNTER — EMERGENCY (EMERGENCY)
Facility: HOSPITAL | Age: 48
LOS: 1 days | Discharge: HOME | End: 2023-03-25
Payer: COMMERCIAL

## 2023-03-25 VITALS
WEIGHT: 179.9 LBS | SYSTOLIC BLOOD PRESSURE: 161 MMHG | RESPIRATION RATE: 20 BRPM | OXYGEN SATURATION: 98 % | DIASTOLIC BLOOD PRESSURE: 90 MMHG | HEIGHT: 64 IN | HEART RATE: 75 BPM

## 2023-03-25 DIAGNOSIS — Z98.89 OTHER SPECIFIED POSTPROCEDURAL STATES: Chronic | ICD-10-CM

## 2023-03-25 PROCEDURE — 83735 ASSAY OF MAGNESIUM: CPT

## 2023-03-25 PROCEDURE — 82803 BLOOD GASES ANY COMBINATION: CPT

## 2023-03-25 PROCEDURE — 85014 HEMATOCRIT: CPT

## 2023-03-25 PROCEDURE — 36415 COLL VENOUS BLD VENIPUNCTURE: CPT

## 2023-03-25 PROCEDURE — 82947 ASSAY GLUCOSE BLOOD QUANT: CPT

## 2023-03-25 PROCEDURE — 99284 EMERGENCY DEPT VISIT MOD MDM: CPT

## 2023-03-25 PROCEDURE — 70450 CT HEAD/BRAIN W/O DYE: CPT

## 2023-03-25 PROCEDURE — 80053 COMPREHEN METABOLIC PANEL: CPT

## 2023-03-25 PROCEDURE — 0225U NFCT DS DNA&RNA 21 SARSCOV2: CPT

## 2023-03-25 PROCEDURE — 82330 ASSAY OF CALCIUM: CPT

## 2023-03-25 PROCEDURE — 70496 CT ANGIOGRAPHY HEAD: CPT | Mod: 26,MA

## 2023-03-25 PROCEDURE — 84132 ASSAY OF SERUM POTASSIUM: CPT

## 2023-03-25 PROCEDURE — 99285 EMERGENCY DEPT VISIT HI MDM: CPT | Mod: 25

## 2023-03-25 PROCEDURE — 85025 COMPLETE CBC W/AUTO DIFF WBC: CPT

## 2023-03-25 PROCEDURE — 96375 TX/PRO/DX INJ NEW DRUG ADDON: CPT | Mod: XU

## 2023-03-25 PROCEDURE — 84702 CHORIONIC GONADOTROPIN TEST: CPT

## 2023-03-25 PROCEDURE — 84295 ASSAY OF SERUM SODIUM: CPT

## 2023-03-25 PROCEDURE — 82435 ASSAY OF BLOOD CHLORIDE: CPT

## 2023-03-25 PROCEDURE — 70498 CT ANGIOGRAPHY NECK: CPT | Mod: MA

## 2023-03-25 PROCEDURE — 85610 PROTHROMBIN TIME: CPT

## 2023-03-25 PROCEDURE — 82962 GLUCOSE BLOOD TEST: CPT

## 2023-03-25 PROCEDURE — 85018 HEMOGLOBIN: CPT

## 2023-03-25 PROCEDURE — 96374 THER/PROPH/DIAG INJ IV PUSH: CPT | Mod: XU

## 2023-03-25 PROCEDURE — 85730 THROMBOPLASTIN TIME PARTIAL: CPT

## 2023-03-25 PROCEDURE — 93005 ELECTROCARDIOGRAM TRACING: CPT

## 2023-03-25 PROCEDURE — 70496 CT ANGIOGRAPHY HEAD: CPT | Mod: MA

## 2023-03-25 PROCEDURE — 83605 ASSAY OF LACTIC ACID: CPT

## 2023-03-25 PROCEDURE — 70498 CT ANGIOGRAPHY NECK: CPT | Mod: 26,MA

## 2023-03-25 NOTE — ED PROVIDER NOTE - PROGRESS NOTE DETAILS
Dr. Omalley: I was not involved in the care of this patient and am only entering information to back log for downtime. Please see paper chart for isolation and medical management details, as the mandatory fields in the disposition section may not be accurate.

## 2023-03-25 NOTE — ED ADULT TRIAGE NOTE - HISTORY OF COVID-19 VACCINATION
Spoke to patient, reviewed potassium level. He will continue to hold his oral potassium supplement. Discussed steroid, let him know that he needs to remain on the steroid until Monday and we would be in touch with instructions on how to taper down at that time. Patient reports that diarrhea has resolved.    Vaccine status unknown

## 2023-03-25 NOTE — ED ADULT TRIAGE NOTE - WEIGHT METHOD
Pt was a no show for scheduled appt today. Attempt to reach pt but unable to leave message.   stated

## 2023-03-25 NOTE — ED PROVIDER NOTE - PATIENT PORTAL LINK FT
You can access the FollowMyHealth Patient Portal offered by [facility] by registering at the following website: [email or web address]. By joining [employer]’s FollowMyHealth portal, you will also be able to view your health information using other applications (apps) compatible with our system.

## 2023-09-21 ENCOUNTER — APPOINTMENT (OUTPATIENT)
Dept: ORTHOPEDIC SURGERY | Facility: CLINIC | Age: 48
End: 2023-09-21

## 2023-10-11 ENCOUNTER — EMERGENCY (EMERGENCY)
Facility: HOSPITAL | Age: 48
LOS: 1 days | Discharge: HOME | End: 2023-10-11
Attending: EMERGENCY MEDICINE
Payer: COMMERCIAL

## 2023-10-11 VITALS
RESPIRATION RATE: 20 BRPM | DIASTOLIC BLOOD PRESSURE: 128 MMHG | SYSTOLIC BLOOD PRESSURE: 180 MMHG | WEIGHT: 164.91 LBS | OXYGEN SATURATION: 98 % | TEMPERATURE: 98 F | HEART RATE: 91 BPM

## 2023-10-11 DIAGNOSIS — Z98.89 OTHER SPECIFIED POSTPROCEDURAL STATES: Chronic | ICD-10-CM

## 2023-10-11 LAB
ALBUMIN SERPL ELPH-MCNC: 4.5 G/DL — SIGNIFICANT CHANGE UP (ref 3.3–5)
ALP SERPL-CCNC: 47 U/L — SIGNIFICANT CHANGE UP (ref 40–120)
ALT FLD-CCNC: <5 U/L — LOW (ref 10–45)
ANION GAP SERPL CALC-SCNC: 9 MMOL/L — SIGNIFICANT CHANGE UP (ref 5–17)
ANISOCYTOSIS BLD QL: SLIGHT — SIGNIFICANT CHANGE UP
APTT BLD: 33.8 SEC — SIGNIFICANT CHANGE UP (ref 24.5–35.6)
AST SERPL-CCNC: 45 U/L — HIGH (ref 10–40)
BASOPHILS # BLD AUTO: 0.07 K/UL — SIGNIFICANT CHANGE UP (ref 0–0.2)
BASOPHILS NFR BLD AUTO: 0.9 % — SIGNIFICANT CHANGE UP (ref 0–2)
BILIRUB SERPL-MCNC: 0.7 MG/DL — SIGNIFICANT CHANGE UP (ref 0.2–1.2)
BUN SERPL-MCNC: 14 MG/DL — SIGNIFICANT CHANGE UP (ref 7–23)
CALCIUM SERPL-MCNC: 9.7 MG/DL — SIGNIFICANT CHANGE UP (ref 8.4–10.5)
CHLORIDE SERPL-SCNC: 100 MMOL/L — SIGNIFICANT CHANGE UP (ref 96–108)
CO2 SERPL-SCNC: 24 MMOL/L — SIGNIFICANT CHANGE UP (ref 22–31)
CREAT SERPL-MCNC: 0.76 MG/DL — SIGNIFICANT CHANGE UP (ref 0.5–1.3)
EGFR: 97 ML/MIN/1.73M2 — SIGNIFICANT CHANGE UP
EOSINOPHIL # BLD AUTO: 0.33 K/UL — SIGNIFICANT CHANGE UP (ref 0–0.5)
EOSINOPHIL NFR BLD AUTO: 4.4 % — SIGNIFICANT CHANGE UP (ref 0–6)
GIANT PLATELETS BLD QL SMEAR: PRESENT — SIGNIFICANT CHANGE UP
GLUCOSE SERPL-MCNC: 138 MG/DL — HIGH (ref 70–99)
HCT VFR BLD CALC: 41.3 % — SIGNIFICANT CHANGE UP (ref 34.5–45)
HGB BLD-MCNC: 13.4 G/DL — SIGNIFICANT CHANGE UP (ref 11.5–15.5)
INR BLD: 1.15 RATIO — SIGNIFICANT CHANGE UP (ref 0.85–1.18)
LYMPHOCYTES # BLD AUTO: 3.19 K/UL — SIGNIFICANT CHANGE UP (ref 1–3.3)
LYMPHOCYTES # BLD AUTO: 42.6 % — SIGNIFICANT CHANGE UP (ref 13–44)
MACROCYTES BLD QL: SLIGHT — SIGNIFICANT CHANGE UP
MANUAL SMEAR VERIFICATION: SIGNIFICANT CHANGE UP
MCHC RBC-ENTMCNC: 26.3 PG — LOW (ref 27–34)
MCHC RBC-ENTMCNC: 32.4 GM/DL — SIGNIFICANT CHANGE UP (ref 32–36)
MCV RBC AUTO: 81.1 FL — SIGNIFICANT CHANGE UP (ref 80–100)
MONOCYTES # BLD AUTO: 0.58 K/UL — SIGNIFICANT CHANGE UP (ref 0–0.9)
MONOCYTES NFR BLD AUTO: 7.8 % — SIGNIFICANT CHANGE UP (ref 2–14)
NEUTROPHILS # BLD AUTO: 3.32 K/UL — SIGNIFICANT CHANGE UP (ref 1.8–7.4)
NEUTROPHILS NFR BLD AUTO: 44.3 % — SIGNIFICANT CHANGE UP (ref 43–77)
PLAT MORPH BLD: NORMAL — SIGNIFICANT CHANGE UP
PLATELET # BLD AUTO: 229 K/UL — SIGNIFICANT CHANGE UP (ref 150–400)
POIKILOCYTOSIS BLD QL AUTO: SLIGHT — SIGNIFICANT CHANGE UP
POTASSIUM SERPL-MCNC: SIGNIFICANT CHANGE UP MMOL/L (ref 3.5–5.3)
POTASSIUM SERPL-SCNC: SIGNIFICANT CHANGE UP MMOL/L (ref 3.5–5.3)
PROT SERPL-MCNC: 8.9 G/DL — HIGH (ref 6–8.3)
PROTHROM AB SERPL-ACNC: 12.6 SEC — SIGNIFICANT CHANGE UP (ref 9.5–13)
RBC # BLD: 5.09 M/UL — SIGNIFICANT CHANGE UP (ref 3.8–5.2)
RBC # FLD: 17.5 % — HIGH (ref 10.3–14.5)
RBC BLD AUTO: SIGNIFICANT CHANGE UP
SCHISTOCYTES BLD QL AUTO: SLIGHT — SIGNIFICANT CHANGE UP
SODIUM SERPL-SCNC: 133 MMOL/L — LOW (ref 135–145)
TROPONIN T, HIGH SENSITIVITY RESULT: <6 NG/L — SIGNIFICANT CHANGE UP (ref 0–51)
WBC # BLD: 7.49 K/UL — SIGNIFICANT CHANGE UP (ref 3.8–10.5)
WBC # FLD AUTO: 7.49 K/UL — SIGNIFICANT CHANGE UP (ref 3.8–10.5)

## 2023-10-11 PROCEDURE — 0042T: CPT | Mod: MA

## 2023-10-11 PROCEDURE — 99285 EMERGENCY DEPT VISIT HI MDM: CPT

## 2023-10-11 PROCEDURE — 70496 CT ANGIOGRAPHY HEAD: CPT | Mod: 26,MA

## 2023-10-11 PROCEDURE — 70450 CT HEAD/BRAIN W/O DYE: CPT | Mod: 26,59,MA

## 2023-10-11 PROCEDURE — 70498 CT ANGIOGRAPHY NECK: CPT | Mod: 26,MA

## 2023-10-11 NOTE — ED PROVIDER NOTE - OBJECTIVE STATEMENT
47 yo F PMHx multiple strokes, HTN, HLD on ASA, Plavix presents with R facial droop at the lip and R tongue numbness. LKW was last night since she woke up this morning at 7.30AM with the symptoms. No other numbness, tingling, weakness.

## 2023-10-11 NOTE — ED PROVIDER NOTE - ATTENDING CONTRIBUTION TO CARE
RGUJRAL 49yo female hx listed presents with R facial droop and R tongue numbness. Pt states symptoms began this morning but she was hoping they would improve. Denies any HA/chest pain. Pt has history of previous CVA. On exam, + R facial droop. Patient is awake,alert,oriented x 3. Patient is well appearing and in no acute distress. Patient's chest is clear to ausculation, +s1s2. Abdomen is soft nd/nt +BS. Extremity with no swelling or calf tenderness. 5/5 UE/LE strength.   Code stroke called on arrival. Check labs, CT/CTA to eval for CVA and monitor.

## 2023-10-11 NOTE — ED ADULT NURSE NOTE - OBJECTIVE STATEMENT
48y female presents to the ED AAOx4 CODE STROKE with complaints of asymmetrical facial drop, slurred speech. PMH HTN, CVA in March. Pt states LKN was approx 9pm yesterday evening. Pt endorsing woke up right side facial, right sided tongue numbness causing speech to be slurred. Pt states went to work today. Pt ARROYO x 4 with equal strength and sensation. Pt Denies headache, dizziness, vision changes, chest pain, shortness of breath, abdominal pain, nausea, vomiting, diarrhea, fevers, chills, dysuria, hematuria, recent illness travel or fall.

## 2023-10-11 NOTE — ED PROVIDER NOTE - PHYSICAL EXAMINATION
GENERAL: no acute distress, non-toxic appearing  HEAD: normocephalic, atraumatic  HEENT: normal conjunctiva, oral mucosa moist, neck supple  CARDIAC: regular rate and rhythm, normal S1 and S2,  no appreciable murmurs  PULM: clear to ascultation bilaterally, no crackles, rales, rhonchi, or wheezing  GI: abdomen nondistended, soft, nontender, no guarding or rebound tenderness  : no CVA tenderness, no suprapubic tenderness  NEURO: +R facial droop no upper face involvement, alert and oriented x 3, normal speech, PERRLA, EOMI, no focal motor or sensory deficits  MSK: no visible deformities, no peripheral edema, calf tenderness/redness/swelling  SKIN: no visible rashes, dry, well-perfused  PSYCH: appropriate mood and affect

## 2023-10-11 NOTE — ED ADULT NURSE NOTE - NSFALLUNIVINTERV_ED_ALL_ED
Bed/Stretcher in lowest position, wheels locked, appropriate side rails in place/Call bell, personal items and telephone in reach/Instruct patient to call for assistance before getting out of bed/chair/stretcher/Non-slip footwear applied when patient is off stretcher/[place] to call system/Physically safe environment - no spills, clutter or unnecessary equipment/Purposeful proactive rounding/Room/bathroom lighting operational, light cord in reach

## 2023-10-11 NOTE — ED ADULT TRIAGE NOTE - CHIEF COMPLAINT QUOTE
R facial droop, slurred speech, numbness to mouth. previous CVA in [facility] R facial droop, slurred speech, numbness to mouth. Pt reports previous CVA in March.

## 2023-10-11 NOTE — ED PROVIDER NOTE - CLINICAL SUMMARY MEDICAL DECISION MAKING FREE TEXT BOX
49 yo F hx of HTN, CVAs, HLD presents with R facial droop no upperface involvement, R tongue numbness - patient hypertensive, NIHSS 2 - CT/CTA/perfusion, neuro consult.

## 2023-10-12 VITALS
HEART RATE: 64 BPM | SYSTOLIC BLOOD PRESSURE: 146 MMHG | DIASTOLIC BLOOD PRESSURE: 94 MMHG | OXYGEN SATURATION: 98 % | TEMPERATURE: 99 F | RESPIRATION RATE: 18 BRPM

## 2023-10-12 LAB
A1C WITH ESTIMATED AVERAGE GLUCOSE RESULT: 5.6 % — SIGNIFICANT CHANGE UP (ref 4–5.6)
ANION GAP SERPL CALC-SCNC: 11 MMOL/L — SIGNIFICANT CHANGE UP (ref 5–17)
BUN SERPL-MCNC: 9 MG/DL — SIGNIFICANT CHANGE UP (ref 7–23)
CALCIUM SERPL-MCNC: 9.4 MG/DL — SIGNIFICANT CHANGE UP (ref 8.4–10.5)
CHLORIDE SERPL-SCNC: 103 MMOL/L — SIGNIFICANT CHANGE UP (ref 96–108)
CHOLEST SERPL-MCNC: 199 MG/DL — SIGNIFICANT CHANGE UP
CHOLEST SERPL-MCNC: 199 MG/DL — SIGNIFICANT CHANGE UP
CO2 SERPL-SCNC: 25 MMOL/L — SIGNIFICANT CHANGE UP (ref 22–31)
CREAT SERPL-MCNC: 0.71 MG/DL — SIGNIFICANT CHANGE UP (ref 0.5–1.3)
EGFR: 105 ML/MIN/1.73M2 — SIGNIFICANT CHANGE UP
ESTIMATED AVERAGE GLUCOSE: 114 MG/DL — SIGNIFICANT CHANGE UP (ref 68–114)
GLUCOSE SERPL-MCNC: 82 MG/DL — SIGNIFICANT CHANGE UP (ref 70–99)
HCG SERPL-ACNC: <2 MIU/ML — SIGNIFICANT CHANGE UP
HDLC SERPL-MCNC: 53 MG/DL — SIGNIFICANT CHANGE UP
HDLC SERPL-MCNC: 53 MG/DL — SIGNIFICANT CHANGE UP
LIPID PNL WITH DIRECT LDL SERPL: 139 MG/DL — HIGH
LIPID PNL WITH DIRECT LDL SERPL: 139 MG/DL — HIGH
NON HDL CHOLESTEROL: 146 MG/DL — HIGH
NON HDL CHOLESTEROL: 146 MG/DL — HIGH
PA ADP PRP-ACNC: 238 PRU — SIGNIFICANT CHANGE UP (ref 194–417)
POTASSIUM SERPL-MCNC: 3.1 MMOL/L — LOW (ref 3.5–5.3)
POTASSIUM SERPL-SCNC: 3.1 MMOL/L — LOW (ref 3.5–5.3)
SODIUM SERPL-SCNC: 139 MMOL/L — SIGNIFICANT CHANGE UP (ref 135–145)
TRIGL SERPL-MCNC: 40 MG/DL — SIGNIFICANT CHANGE UP
TRIGL SERPL-MCNC: 40 MG/DL — SIGNIFICANT CHANGE UP

## 2023-10-12 PROCEDURE — 70498 CT ANGIOGRAPHY NECK: CPT | Mod: MA

## 2023-10-12 PROCEDURE — 80048 BASIC METABOLIC PNL TOTAL CA: CPT

## 2023-10-12 PROCEDURE — G0378: CPT

## 2023-10-12 PROCEDURE — 84295 ASSAY OF SERUM SODIUM: CPT

## 2023-10-12 PROCEDURE — G1004: CPT

## 2023-10-12 PROCEDURE — 93356 MYOCRD STRAIN IMG SPCKL TRCK: CPT

## 2023-10-12 PROCEDURE — 70551 MRI BRAIN STEM W/O DYE: CPT | Mod: MG

## 2023-10-12 PROCEDURE — 84702 CHORIONIC GONADOTROPIN TEST: CPT

## 2023-10-12 PROCEDURE — 82435 ASSAY OF BLOOD CHLORIDE: CPT

## 2023-10-12 PROCEDURE — 80053 COMPREHEN METABOLIC PANEL: CPT

## 2023-10-12 PROCEDURE — 85018 HEMOGLOBIN: CPT

## 2023-10-12 PROCEDURE — 70496 CT ANGIOGRAPHY HEAD: CPT | Mod: MA

## 2023-10-12 PROCEDURE — 0042T: CPT | Mod: MA

## 2023-10-12 PROCEDURE — 84132 ASSAY OF SERUM POTASSIUM: CPT

## 2023-10-12 PROCEDURE — 99285 EMERGENCY DEPT VISIT HI MDM: CPT | Mod: 25

## 2023-10-12 PROCEDURE — 85610 PROTHROMBIN TIME: CPT

## 2023-10-12 PROCEDURE — 82947 ASSAY GLUCOSE BLOOD QUANT: CPT

## 2023-10-12 PROCEDURE — 70551 MRI BRAIN STEM W/O DYE: CPT | Mod: 26,MG

## 2023-10-12 PROCEDURE — 70450 CT HEAD/BRAIN W/O DYE: CPT | Mod: MA

## 2023-10-12 PROCEDURE — 36415 COLL VENOUS BLD VENIPUNCTURE: CPT

## 2023-10-12 PROCEDURE — 85014 HEMATOCRIT: CPT

## 2023-10-12 PROCEDURE — 93306 TTE W/DOPPLER COMPLETE: CPT

## 2023-10-12 PROCEDURE — 85576 BLOOD PLATELET AGGREGATION: CPT

## 2023-10-12 PROCEDURE — 82330 ASSAY OF CALCIUM: CPT

## 2023-10-12 PROCEDURE — 85730 THROMBOPLASTIN TIME PARTIAL: CPT

## 2023-10-12 PROCEDURE — 82803 BLOOD GASES ANY COMBINATION: CPT

## 2023-10-12 PROCEDURE — 93005 ELECTROCARDIOGRAM TRACING: CPT

## 2023-10-12 PROCEDURE — 93291 INTERROG DEV EVAL SCRMS IP: CPT | Mod: 26

## 2023-10-12 PROCEDURE — 82962 GLUCOSE BLOOD TEST: CPT

## 2023-10-12 PROCEDURE — 80061 LIPID PANEL: CPT

## 2023-10-12 PROCEDURE — 84484 ASSAY OF TROPONIN QUANT: CPT

## 2023-10-12 PROCEDURE — 83605 ASSAY OF LACTIC ACID: CPT

## 2023-10-12 PROCEDURE — 93306 TTE W/DOPPLER COMPLETE: CPT | Mod: 26

## 2023-10-12 PROCEDURE — 99236 HOSP IP/OBS SAME DATE HI 85: CPT

## 2023-10-12 PROCEDURE — 83036 HEMOGLOBIN GLYCOSYLATED A1C: CPT

## 2023-10-12 PROCEDURE — 85025 COMPLETE CBC W/AUTO DIFF WBC: CPT

## 2023-10-12 RX ORDER — TICAGRELOR 90 MG/1
90 TABLET ORAL ONCE
Refills: 0 | Status: COMPLETED | OUTPATIENT
Start: 2023-10-12 | End: 2023-10-12

## 2023-10-12 RX ORDER — TICAGRELOR 90 MG/1
1 TABLET ORAL
Qty: 42 | Refills: 0
Start: 2023-10-12 | End: 2023-11-01

## 2023-10-12 RX ORDER — POTASSIUM CHLORIDE 20 MEQ
40 PACKET (EA) ORAL ONCE
Refills: 0 | Status: COMPLETED | OUTPATIENT
Start: 2023-10-12 | End: 2023-10-12

## 2023-10-12 RX ORDER — ATORVASTATIN CALCIUM 80 MG/1
80 TABLET, FILM COATED ORAL AT BEDTIME
Refills: 0 | Status: DISCONTINUED | OUTPATIENT
Start: 2023-10-12 | End: 2023-10-15

## 2023-10-12 RX ORDER — ASPIRIN/CALCIUM CARB/MAGNESIUM 324 MG
81 TABLET ORAL DAILY
Refills: 0 | Status: DISCONTINUED | OUTPATIENT
Start: 2023-10-12 | End: 2023-10-15

## 2023-10-12 RX ADMIN — Medication 81 MILLIGRAM(S): at 04:27

## 2023-10-12 RX ADMIN — Medication 40 MILLIEQUIVALENT(S): at 10:36

## 2023-10-12 RX ADMIN — TICAGRELOR 90 MILLIGRAM(S): 90 TABLET ORAL at 14:57

## 2023-10-12 RX ADMIN — ATORVASTATIN CALCIUM 80 MILLIGRAM(S): 80 TABLET, FILM COATED ORAL at 04:26

## 2023-10-12 NOTE — ED CDU PROVIDER INITIAL DAY NOTE - DETAILS
tele, neuro checks, mri, loop interrogation, neurology following, DW ED team, vitals every 4 hours, frequent reevaluations

## 2023-10-12 NOTE — ED CDU PROVIDER DISPOSITION NOTE - CLINICAL COURSE
49 yo F PMHx multiple strokes, HTN, HLD presents with R facial droop at the lip and R tongue numbness. Patient states that she woke up with symptoms at 730AM yesterday. Denies numbness, denies weakness. No other complaints at this time.   ED course: CODE STROKE. CT/CTA showing Chronic left MCA territory infarcts. Neurology recommended MRI. Plan for imaging, neuro checks, and tele monitoring in CDU. 47 yo F PMHx multiple strokes, HTN, HLD presents with R facial droop at the lip and R tongue numbness. Patient states that she woke up with symptoms at 730AM yesterday. Denies numbness, denies weakness. No other complaints at this time.   ED course: CODE STROKE. CT/CTA showing Chronic left MCA territory infarcts. Neurology recommended MRI. Plan for imaging, neuro checks, and tele monitoring in CDU.  In CDU, shows small new acute posterior left corona radiata stroke.  No actionable findings on TTE.  No significant events on loop recorder interrogation.  No events on telemetry.  VSS.  On exam pt with R sided facial droop, no extremity weakness, ambulatory in ED w/o issue.  P2Y12 238 indicating suboptimal platelet inhibition while on Plavix so patient switched to Brilinta 90 mg twice daily.  Patient had extensive stroke work-up done in March.  Patient cleared for discharge by neurology.  Patient will follow-up with neurology and her PMD.  All results discharge instructions and return precautions given.

## 2023-10-12 NOTE — ED CDU PROVIDER INITIAL DAY NOTE - PROGRESS NOTE DETAILS
Spoke with MRI, recommending MRI brain w/o, permissive /110, loop recorder interrogation, and to continue Aspirin. - Alejandrina Kenney PA-C Shows small new acute posterior left corona radiata stroke.  No actionable findings on TTE.  No significant events on loop recorder interrogation.  No events on telemetry.  VSS.  P2Y12 238 indicating suboptimal platelet inhibition while on Plavix so patient switched to Brilinta 90 mg twice daily.  Patient had extensive stroke work-up done in March.  Patient cleared for discharge by neurology.  Patient will follow-up with neurology and her PMD.  All results discharge instructions and return precautions given. Shows small new acute posterior left corona radiata stroke.  No actionable findings on TTE.  No significant events on loop recorder interrogation.  No events on telemetry.  VSS.  On exam pt with R sided facial droop, no extremity weakness, ambulatory in ED w/o issue.  P2Y12 238 indicating suboptimal platelet inhibition while on Plavix so patient switched to Brilinta 90 mg twice daily.  Patient had extensive stroke work-up done in March.  Patient cleared for discharge by neurology.  Patient will follow-up with neurology and her PMD.  All results discharge instructions and return precautions given.

## 2023-10-12 NOTE — ED CDU PROVIDER INITIAL DAY NOTE - NS ED ATTENDING STATEMENT MOD
I have seen and examined this patient and fully participated in the care of this patient as the teaching attending.  The service was shared with the ROSE.  I reviewed and verified the documentation and independently performed the documented:

## 2023-10-12 NOTE — ED CDU PROVIDER DISPOSITION NOTE - PATIENT PORTAL LINK FT
You can access the FollowMyHealth Patient Portal offered by [facility] by registering at the following website: [email or web address]. By joining [employer]’s FollowMyHealth portal, you will also be able to view your health information using other applications (apps) compatible with our system.

## 2023-10-12 NOTE — PROCEDURE NOTE - ADDITIONAL PROCEDURE DETAILS
Indication for interrogation: CVA    Presenting rhythm: SR 70s    Normal ILR function     Stored data revealed one episode recorded as AF on 5/26/23 lasting 2 minutes consistent with SR w/ PACs, no clear AF    No other episodes

## 2023-10-12 NOTE — PROCEDURE NOTE - NSINTBATTERYSTAT_CARD_ALL_CORE
Progress Note    Patient Name: Tracee Cooper  Date: 10/8/19         Service Type: Individual  Video Visit: No     Session Start Time: 11am  Session End Time: 11:53     Session Length: 53    Session #: 9    Attendees: Client attended alone     Treatment Plan Last Reviewed: 7/30/19 (Review by 10/30/19)  CGI: Initial completed  PHQ-9 / ORVILLE-7 : 7/10        DATA  Interactive Complexity: No  Crisis: No       Progress Since Last Session (Related to Symptoms / Goals / Homework):   Symptoms: No change      Homework: Achieved / completed to satisfaction      Episode of Care Goals: Satisfactory progress - ACTION (Actively working towards change); Intervened by reinforcing change plan / affirming steps taken     Current / Ongoing Stressors and Concerns:   Ongoing: Anxiety and panic attacks while driving   Current: Discussed interpersonal conflict with sister-in-laws and  that have impacted mood.     Treatment Objective(s) Addressed in This Session:   Patient will use cognitive strategies identified in therapy to challenge anxious thoughts.     Intervention:   CBT: Coached on assertiveness skills, and how all-or-nothing thinking patterns may influence emotions and reactions with others. Practiced reframing thoughts to increase more effective interpersonal responses         ASSESSMENT: Current Emotional / Mental Status (status of significant symptoms):   Risk status (Self / Other harm or suicidal ideation)   Patient denies current fears or concerns for personal safety.   Patient denies current or recent suicidal ideation or behaviors.   Patientdenies current or recent homicidal ideation or behaviors.   Patient denies current or recent self injurious behavior or ideation.   Patient denies other safety concerns.   Patient Patient reports there has been no change in risk factors since their last session.     PatientPatient reports there has been no change in protective factors  Good "since their last session.     Recommended that patient call 911 or go to the local ED should there be a change in any of these risk factors.     Appearance:   Appropriate    Eye Contact:   Good    Psychomotor Behavior: Normal    Attitude:   Cooperative    Orientation:   All   Speech    Rate / Production: Normal     Volume:  Normal    Mood:    Anxious  Depressed    Affect:    Appropriate    Thought Content:  Clear    Thought Form:  Coherent  Logical    Insight:    Good      Medication Review:   No changes to current psychiatric medication(s)     Medication Compliance:   Yes     Changes in Health Issues:   None reported     Chemical Use Review:   Substance Use: Chemical use reviewed, no active concerns identified      Tobacco Use: No current tobacco use.      Diagnosis:  1. Generalized anxiety disorder         Collateral Reports Completed:   Not Applicable    PLAN: (Patient Tasks / Therapist Tasks / Other)  HOMEWORK: Positive journaling, identify successes. Practice positive self-talk. Identify emotion, acknowledge without judgment. Identify \"old versus new\" thinking versus \"good/bad\" thinking.      Eunice Mixon MA, [facility]                                                          ______________________________________________________________________    Treatment Plan    Patient's Name: Tracee Cooper  YOB: 1968    Date: 7/30/19    Diagnoses:  300.02 (F41.1) Generalized Anxiety Disorder   Rule out Posttraumatic Stress Disorder  Psychosocial & Contextual Factors: Past trauma, children's mental health concerns, work stressors  WHODAS: Completed    Referral / Collaboration:  Referral to another professional/service is not indicated at this time.    Anticipated number of session or this episode of care: 20      MeasurableTreatment Goal(s) related to diagnosis / functional impairment(s)  Goal 1: Patient will decrease anxiety levels as evidence by ORVILLE-7 scores, and client report.    I will know I've met my " goal when I have less panic attacks, when I can drive to a new place without feeling anxious or panicked.      Objective #A (Patient Action)    Patient will identify 3 initial signs or symptoms of anxiety.  Status: New - Date: 7/30/19     Intervention(s)  Therapist will assign homework    teach emotional recognition/identification.      Objective #B  Patient will use cognitive strategies identified in therapy to challenge anxious thoughts.  Status: New - Date: 7/30/19     Intervention(s)  Therapist will assign homework    teach CBT techniques to identify and challenge cognitive distortions.    Objective #C  Patient will identify three distraction and diversion activities and use those activities to decrease level of anxiety  .  Status: New - Date: 7/30/19     Intervention(s)  Therapist will assign homework    teach DBT techniques including Mindfulness and Distress Tolerance.        Patient has reviewed and agreed to the above plan.      Eunice Mixon MA, [facility]  10/14/19
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
## 2023-10-12 NOTE — ED ADULT NURSE REASSESSMENT NOTE - NS ED NURSE REASSESS COMMENT FT1
Pt received from PIPER Mejia. Pt A&O x4, oriented to CDU & plan of care discussed. Pt in CDU for Telemetry, Neuro check, and MRI. Patient continue to have facial droop, speech clear, denies any chest pain, SOB, dizziness or palpitations. Aspirin and lipitor given as ordered. MRI screening forms faxed. V/S stable, IV in place, patent and free of signs of infiltration. Pt resting in bed. Safety & comfort measures maintained. Call bell in reach. Will continue to monitor.
0740-patient received alert & oriented x4, ambulatory to bathroom with steady gait. Right facial droop still noted. Patient denies numbness or tingling to either sides of body. GCS-15. Appropriate verbal response.   0800-MRI already done. Awaiting for results.

## 2023-10-12 NOTE — ED CDU PROVIDER DISPOSITION NOTE - NSFOLLOWUPINSTRUCTIONS_ED_ALL_ED_FT
Hydrate.     Continue to take Aspirin 81mg and Atorvastatin 80mg once a day.     We recommend you follow up with your primary care provider within the next 2-3 days, please bring all of your results with you.     You can also follow up with *NEURO    Please return to the Emergency Department with new, worsening, or concerning symptoms, such as:  -Shortness of breath or trouble breathing  -Pressure, pain, tightness in chest  -Facial drooping, arm weakness, or speech difficulty   -Head injury or loss of consciousness   -Nonstop bleeding or an open wound     *More detailed information regarding your visit and discharge can be found by reviewing this packet Follow-up with your primary care doctor and neurologist within 1 week.    Julio Regalado  Neurology  [address] [facility] [address], [address]  [place] [address]  Phone: [phone]  Fax: [phone]    STOP taking Plavix (Clopidogrel) and START staking Brilinta (Ticagrelor) 90mg twice daily.  Continue all other medications as prescribed.    Return to the ED with any new, worsening or concerning symptoms.    See attached ischemic stroke info sheet.

## 2023-10-12 NOTE — ED CDU PROVIDER DISPOSITION NOTE - ATTENDING APP SHARED VISIT CONTRIBUTION OF CARE
------------ATTENDING NOTE------------  CDU for concerns acute CVA, remained stable w/o new/worsening symptoms, cleared by labs/imaging and Neuro Stroke consult for safe dc w/ close outpt fu, in depth dw all about ddx, tx, pearson, continued clsoe outpt fu.  - Jose Ramon Villaseñor MD   --------------------------------------------
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
## 2023-10-12 NOTE — ED CDU PROVIDER INITIAL DAY NOTE - NS_OBSORDERDATE_ED_A_ED
Service Date: 11/19/2019      PRIMARY CARE PHYSICIAN:  Dr. Kike Ng.      HISTORY OF PRESENT ILLNESS:  I again had the pleasure of seeing your patient, Geoffrey Benjamin (Woody), at [facility] in [place] for evaluation of coronary artery disease.  The patient is status post 4-vessel bypass surgery 11/07/2006 utilizing the left internal mammary artery to the LAD and a diagonal branch artery sequentially as well as a saphenous vein graft to the posterior branch of the right coronary artery and a free radial artery to the obtuse marginal branch artery.  His ejection fraction was well maintained.  The last stress echocardiogram 04/04/2013 was normal.  The patient continues to exercise nearly every day by [place] 30 minutes without angina or shortness of breath.  He does note some heaviness and twinges in his chest while watching television at night but not with exertion.  He denies hospitalizations since I last saw him 1 year ago.  He did have a prostate biopsy in August which was benign.  He feels as though his erectile dysfunction has worsened since that biopsy.  He also had a colonoscopy in the last few months that was normal.  The patient notes some imbalance when turning his head suddenly persisting over the last 3-4 months.  He has not had any falls.  He has significant knee and joint pain for which he uses CBD oil with some benefit.  He feels that this has increased his glucose, however.  His hemoglobin A1c on 09/24/2019 was 9.8%.  The patient is not on insulin therapy but instead using glimepiride as well as metformin.  The patient has been somewhat intolerant of atorvastatin in the past but is now tolerating atorvastatin 10 mg every day.  His most recent lipid profile on 03/04/2019 included total cholesterol 174, triglycerides 114, HDL 35,  and ratio of 5.  The patient is willing to increase his atorvastatin to see if we can improve his LDL cholesterol towards  his goal of less than 70.      PHYSICAL EXAMINATION:  As listed below.      ASSESSMENT:   1.  Juan Childress is a delightful 74-year-old male status post 4-vessel bypass surgery on 2016 due to severe 3-vessel coronary artery disease and an abnormal stress test.  He denies recurrent exertional angina or congestive heart failure.  The patient notes some chest heaviness and twinges in his chest at rest but not with activity.  This would not likely represent angina pectoris.  Given his otherwise lack of exertional symptoms I do not see the point in repeating a stress test especially given the results of the ISCHEMIA trial.  We will continue to monitor and consider stress testing in the future.   2.  Hyperlipidemia.  He has occasional night cramps probably not secondary to his statin.  We will increase his atorvastatin to 20 mg per day and repeat a lipid profile and ALT in 3 months.  Ultimately I would expect increasing his atorvastatin to 40 or 80 mg should he tolerate dose adjustments.  For now the patient has been intolerant to Zetia in the past and PCSK9 inhibitors remain a viable option.      It is my pleasure to assist in the care of Geoffrey Childress.  I intend to see him again in 1 year.  He is quite adamant that he does not intend to make other than minimal diet changes for his diabetes.  I am thus asking him to return to Dr. Ng's office for further treatment options for a very high hemoglobin A1c.  All the patient's questions were answered to his satisfaction.      Aislinn Cohn MD        cc:   Kike Ng MD    [facility]    [address]    [place]  [address]         AISLINN COHN MD, [facility]             D: 2019   T: 2019   MT: MARTHA      Name:     GEOFFREY CHILDRESS   MRN:      [id number]        Account:      [id number]   :      1945           Service Date: 2019      Document: [id number]       12-Oct-2023 03:07

## 2023-10-12 NOTE — PROCEDURE NOTE - NSPROCNAME_GEN_A_CORE
Did a virtual with this pt last week.    Needs an appointment with me in approximately a month , in person or virtual ,   RFV: (Reason For Visit)  Gastritis Nausea Vomiting    Thanks.    ILR Interrogation

## 2023-10-12 NOTE — ED CDU PROVIDER INITIAL DAY NOTE - OBJECTIVE STATEMENT
49 yo F PMHx multiple strokes, HTN, HLD presents with R facial droop at the lip and R tongue numbness. Patient states that she woke up with symptoms at 730AM yesterday. Denies numbness, denies weakness. No other complaints at this time.   ED course: CODE STROKE. CT/CTA showing Chronic left MCA territory infarcts. Neurology recommended MRI. Plan for imaging, neuro checks, and tele monitoring in CDU.
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
## 2023-10-12 NOTE — ED CDU PROVIDER INITIAL DAY NOTE - NEUROLOGICAL, MLM
Alert and oriented, clear speech, strength 5/5 UE, sensation grossly intact, +R facial droop no upper face involvement

## 2023-10-13 RX ORDER — VALSARTAN 80 MG/1
1 TABLET ORAL
Qty: 7 | Refills: 0
Start: 2023-10-13 | End: 2023-10-19

## 2023-10-13 RX ORDER — ATORVASTATIN CALCIUM 80 MG/1
1 TABLET, FILM COATED ORAL
Qty: 7 | Refills: 0
Start: 2023-10-13 | End: 2023-10-19

## 2023-10-13 NOTE — ED POST DISCHARGE NOTE - ADDITIONAL DOCUMENTATION
10/13/23 Mari MOJICA- pt called stating that she let ED know that she ran out of her valsartan and atorvastatin medication and needed short course until she follows up with her doctor, but rx not sent. reviewed, confirmed pt takes atorvastatin 80mg daily and valsartan 160mg daily and that she will be able to see her doctor within 1 week. both rx sent to her pharmacy.

## 2023-12-21 ENCOUNTER — APPOINTMENT (OUTPATIENT)
Dept: CARDIOLOGY | Facility: CLINIC | Age: 48
End: 2023-12-21

## 2023-12-25 NOTE — STROKE CODE NOTE - IV ALTEPLASE EXCL ABS HIDDEN
Writer contacted Sofya ([phone]), /nurse, to enquire about infusion equipment and events leading up to ED transfer.  Sofya states \"a lot more behavioral with staff, intermittently violent with staff\".  Sofya is not familiar with infusion equipment or how it's supplied.   show

## 2024-03-18 ENCOUNTER — EMERGENCY (EMERGENCY)
Facility: HOSPITAL | Age: 49
LOS: 1 days | Discharge: HOME | End: 2024-03-18
Attending: EMERGENCY MEDICINE
Payer: COMMERCIAL

## 2024-03-18 VITALS
WEIGHT: 177.91 LBS | HEART RATE: 88 BPM | HEIGHT: 66 IN | DIASTOLIC BLOOD PRESSURE: 145 MMHG | SYSTOLIC BLOOD PRESSURE: 217 MMHG | TEMPERATURE: 98 F | RESPIRATION RATE: 16 BRPM | OXYGEN SATURATION: 100 %

## 2024-03-18 DIAGNOSIS — Z98.89 OTHER SPECIFIED POSTPROCEDURAL STATES: Chronic | ICD-10-CM

## 2024-03-18 LAB
ALBUMIN SERPL ELPH-MCNC: 4.4 G/DL — SIGNIFICANT CHANGE UP (ref 3.3–5)
ALP SERPL-CCNC: 70 U/L — SIGNIFICANT CHANGE UP (ref 40–120)
ALT FLD-CCNC: 12 U/L — SIGNIFICANT CHANGE UP (ref 10–45)
ANION GAP SERPL CALC-SCNC: 12 MMOL/L — SIGNIFICANT CHANGE UP (ref 5–17)
ANION GAP SERPL CALC-SCNC: 12 MMOL/L — SIGNIFICANT CHANGE UP (ref 5–17)
ANION GAP SERPL CALC-SCNC: 9 MMOL/L — SIGNIFICANT CHANGE UP (ref 5–17)
APTT BLD: 31.8 SEC — SIGNIFICANT CHANGE UP (ref 24.5–35.6)
AST SERPL-CCNC: 36 U/L — SIGNIFICANT CHANGE UP (ref 10–40)
BASE EXCESS BLDV CALC-SCNC: -0.6 MMOL/L — SIGNIFICANT CHANGE UP (ref -2–3)
BASOPHILS # BLD AUTO: 0.06 K/UL — SIGNIFICANT CHANGE UP (ref 0–0.2)
BASOPHILS NFR BLD AUTO: 0.8 % — SIGNIFICANT CHANGE UP (ref 0–2)
BILIRUB SERPL-MCNC: 0.8 MG/DL — SIGNIFICANT CHANGE UP (ref 0.2–1.2)
BUN SERPL-MCNC: 10 MG/DL — SIGNIFICANT CHANGE UP (ref 7–23)
BUN SERPL-MCNC: 11 MG/DL — SIGNIFICANT CHANGE UP (ref 7–23)
BUN SERPL-MCNC: 12 MG/DL — SIGNIFICANT CHANGE UP (ref 7–23)
CA-I SERPL-SCNC: 1.1 MMOL/L — LOW (ref 1.15–1.33)
CALCIUM SERPL-MCNC: 8.5 MG/DL — SIGNIFICANT CHANGE UP (ref 8.4–10.5)
CALCIUM SERPL-MCNC: 9.5 MG/DL — SIGNIFICANT CHANGE UP (ref 8.4–10.5)
CALCIUM SERPL-MCNC: 9.8 MG/DL — SIGNIFICANT CHANGE UP (ref 8.4–10.5)
CHLORIDE BLDV-SCNC: 104 MMOL/L — SIGNIFICANT CHANGE UP (ref 96–108)
CHLORIDE SERPL-SCNC: 102 MMOL/L — SIGNIFICANT CHANGE UP (ref 96–108)
CHLORIDE SERPL-SCNC: 102 MMOL/L — SIGNIFICANT CHANGE UP (ref 96–108)
CHLORIDE SERPL-SCNC: 94 MMOL/L — LOW (ref 96–108)
CO2 BLDV-SCNC: 26 MMOL/L — SIGNIFICANT CHANGE UP (ref 22–26)
CO2 SERPL-SCNC: 20 MMOL/L — LOW (ref 22–31)
CO2 SERPL-SCNC: 23 MMOL/L — SIGNIFICANT CHANGE UP (ref 22–31)
CO2 SERPL-SCNC: 24 MMOL/L — SIGNIFICANT CHANGE UP (ref 22–31)
CREAT SERPL-MCNC: 0.67 MG/DL — SIGNIFICANT CHANGE UP (ref 0.5–1.3)
CREAT SERPL-MCNC: 0.76 MG/DL — SIGNIFICANT CHANGE UP (ref 0.5–1.3)
CREAT SERPL-MCNC: 0.81 MG/DL — SIGNIFICANT CHANGE UP (ref 0.5–1.3)
EGFR: 108 ML/MIN/1.73M2 — SIGNIFICANT CHANGE UP
EGFR: 89 ML/MIN/1.73M2 — SIGNIFICANT CHANGE UP
EGFR: 97 ML/MIN/1.73M2 — SIGNIFICANT CHANGE UP
EOSINOPHIL # BLD AUTO: 0.43 K/UL — SIGNIFICANT CHANGE UP (ref 0–0.5)
EOSINOPHIL NFR BLD AUTO: 6 % — SIGNIFICANT CHANGE UP (ref 0–6)
GAS PNL BLDV: 133 MMOL/L — LOW (ref 136–145)
GAS PNL BLDV: SIGNIFICANT CHANGE UP
GLUCOSE BLDV-MCNC: 79 MG/DL — SIGNIFICANT CHANGE UP (ref 70–99)
GLUCOSE SERPL-MCNC: 76 MG/DL — SIGNIFICANT CHANGE UP (ref 70–99)
GLUCOSE SERPL-MCNC: 90 MG/DL — SIGNIFICANT CHANGE UP (ref 70–99)
GLUCOSE SERPL-MCNC: 95 MG/DL — SIGNIFICANT CHANGE UP (ref 70–99)
HCG SERPL-ACNC: <2 MIU/ML — SIGNIFICANT CHANGE UP
HCO3 BLDV-SCNC: 25 MMOL/L — SIGNIFICANT CHANGE UP (ref 22–29)
HCT VFR BLD CALC: 41.2 % — SIGNIFICANT CHANGE UP (ref 34.5–45)
HCT VFR BLDA CALC: 40 % — SIGNIFICANT CHANGE UP (ref 34.5–46.5)
HGB BLD CALC-MCNC: 13.4 G/DL — SIGNIFICANT CHANGE UP (ref 11.7–16.1)
HGB BLD-MCNC: 13.5 G/DL — SIGNIFICANT CHANGE UP (ref 11.5–15.5)
IMM GRANULOCYTES NFR BLD AUTO: 0.1 % — SIGNIFICANT CHANGE UP (ref 0–0.9)
INR BLD: 1.05 RATIO — SIGNIFICANT CHANGE UP (ref 0.85–1.18)
LACTATE BLDV-MCNC: 1.3 MMOL/L — SIGNIFICANT CHANGE UP (ref 0.5–2)
LYMPHOCYTES # BLD AUTO: 3.47 K/UL — HIGH (ref 1–3.3)
LYMPHOCYTES # BLD AUTO: 48.1 % — HIGH (ref 13–44)
MCHC RBC-ENTMCNC: 26.4 PG — LOW (ref 27–34)
MCHC RBC-ENTMCNC: 32.8 GM/DL — SIGNIFICANT CHANGE UP (ref 32–36)
MCV RBC AUTO: 80.5 FL — SIGNIFICANT CHANGE UP (ref 80–100)
MONOCYTES # BLD AUTO: 0.7 K/UL — SIGNIFICANT CHANGE UP (ref 0–0.9)
MONOCYTES NFR BLD AUTO: 9.7 % — SIGNIFICANT CHANGE UP (ref 2–14)
NEUTROPHILS # BLD AUTO: 2.55 K/UL — SIGNIFICANT CHANGE UP (ref 1.8–7.4)
NEUTROPHILS NFR BLD AUTO: 35.3 % — LOW (ref 43–77)
NRBC # BLD: 0 /100 WBCS — SIGNIFICANT CHANGE UP (ref 0–0)
PCO2 BLDV: 43 MMHG — HIGH (ref 39–42)
PH BLDV: 7.37 — SIGNIFICANT CHANGE UP (ref 7.32–7.43)
PLATELET # BLD AUTO: 352 K/UL — SIGNIFICANT CHANGE UP (ref 150–400)
PO2 BLDV: 47 MMHG — HIGH (ref 25–45)
POTASSIUM BLDV-SCNC: 6.4 MMOL/L — CRITICAL HIGH (ref 3.5–5.1)
POTASSIUM SERPL-MCNC: 3.9 MMOL/L — SIGNIFICANT CHANGE UP (ref 3.5–5.3)
POTASSIUM SERPL-MCNC: 6.2 MMOL/L — CRITICAL HIGH (ref 3.5–5.3)
POTASSIUM SERPL-MCNC: SIGNIFICANT CHANGE UP MMOL/L (ref 3.5–5.3)
POTASSIUM SERPL-SCNC: 3.9 MMOL/L — SIGNIFICANT CHANGE UP (ref 3.5–5.3)
POTASSIUM SERPL-SCNC: 6.2 MMOL/L — CRITICAL HIGH (ref 3.5–5.3)
POTASSIUM SERPL-SCNC: SIGNIFICANT CHANGE UP MMOL/L (ref 3.5–5.3)
PROT SERPL-MCNC: 9.4 G/DL — HIGH (ref 6–8.3)
PROTHROM AB SERPL-ACNC: 11 SEC — SIGNIFICANT CHANGE UP (ref 9.5–13)
RBC # BLD: 5.12 M/UL — SIGNIFICANT CHANGE UP (ref 3.8–5.2)
RBC # FLD: 15.5 % — HIGH (ref 10.3–14.5)
SAO2 % BLDV: 78.2 % — SIGNIFICANT CHANGE UP (ref 67–88)
SODIUM SERPL-SCNC: 123 MMOL/L — LOW (ref 135–145)
SODIUM SERPL-SCNC: 137 MMOL/L — SIGNIFICANT CHANGE UP (ref 135–145)
SODIUM SERPL-SCNC: 138 MMOL/L — SIGNIFICANT CHANGE UP (ref 135–145)
TROPONIN T, HIGH SENSITIVITY RESULT: <6 NG/L — SIGNIFICANT CHANGE UP (ref 0–51)
WBC # BLD: 7.22 K/UL — SIGNIFICANT CHANGE UP (ref 3.8–10.5)
WBC # FLD AUTO: 7.22 K/UL — SIGNIFICANT CHANGE UP (ref 3.8–10.5)

## 2024-03-18 PROCEDURE — 0042T: CPT | Mod: MC

## 2024-03-18 PROCEDURE — 70496 CT ANGIOGRAPHY HEAD: CPT | Mod: 26,MC

## 2024-03-18 PROCEDURE — 99223 1ST HOSP IP/OBS HIGH 75: CPT

## 2024-03-18 PROCEDURE — 70498 CT ANGIOGRAPHY NECK: CPT | Mod: 26,MC

## 2024-03-18 PROCEDURE — 70450 CT HEAD/BRAIN W/O DYE: CPT | Mod: 26,MC,59

## 2024-03-18 RX ORDER — AMLODIPINE BESYLATE 2.5 MG/1
10 TABLET ORAL DAILY
Refills: 0 | Status: DISCONTINUED | OUTPATIENT
Start: 2024-03-18 | End: 2024-03-22

## 2024-03-18 RX ORDER — TICAGRELOR 90 MG/1
90 TABLET ORAL DAILY
Refills: 0 | Status: DISCONTINUED | OUTPATIENT
Start: 2024-03-18 | End: 2024-03-22

## 2024-03-18 RX ORDER — VALSARTAN 80 MG/1
160 TABLET ORAL DAILY
Refills: 0 | Status: DISCONTINUED | OUTPATIENT
Start: 2024-03-18 | End: 2024-03-22

## 2024-03-18 RX ORDER — ASPIRIN/CALCIUM CARB/MAGNESIUM 324 MG
81 TABLET ORAL DAILY
Refills: 0 | Status: DISCONTINUED | OUTPATIENT
Start: 2024-03-18 | End: 2024-03-22

## 2024-03-18 RX ORDER — ATORVASTATIN CALCIUM 80 MG/1
80 TABLET, FILM COATED ORAL AT BEDTIME
Refills: 0 | Status: DISCONTINUED | OUTPATIENT
Start: 2024-03-18 | End: 2024-03-22

## 2024-03-18 RX ADMIN — ATORVASTATIN CALCIUM 80 MILLIGRAM(S): 80 TABLET, FILM COATED ORAL at 21:50

## 2024-03-18 NOTE — ED PROVIDER NOTE - PHYSICAL EXAMINATION
GEN: NAD. A&Ox3. Non-toxic appearing.  HEENT: normocephalic, atraumatic, EOMI, PERRL, no scleral icterus, no conjuntival pallor, moist MM  CARDIAC: RRR, S1, S2, no murmur. Radial pulses present and symmetric b/l  PULM: CTA B/L no wheeze, rhonchi, rales.   ABD: soft NT, ND, no rebound no guarding  MSK: Moving all extremities, no edema. 5/5 strength and full ROM in all extremities.     NEURO: gait normal, CN 2-12 grossly intact, left hand decreased sensation, otherwise no neuro deficits  SKIN: warm, dry, no rash.

## 2024-03-18 NOTE — CONSULT NOTE ADULT - ASSESSMENT
Impression:   Transient L facial and arm numbness i/s/o multiple prior cerebral infarcts on MRI. Prior ILR interrogation (10/2023) with 2 mins of AF vs. SR with PAC's. Concerning for R cerebral dysfunction. R/o stroke, ESUS vs. cardioembilic vs. other unknown mechanism    General  [] permissive HTN for 24 hours up to 220/110  [] gradual normotension after 24 hrs  [] telemetry monitoring    Imaging   [] MRI head non contrast  [] STAT repeat CTH if change in neuro status    Meds  [] Continue home ASA 81 mg daily  [] Continue home Brilinta 90 mg daily  [] Continue home Atorvastatin 80 mg daily     Studies  [] EP interrogation of ILR    Labs   [] CBC, BMP  [] Lipid panel  [] HbA1C    Consults:  [] EP for ILR interrogation    Other  [] PT/OT  [] dysphagia screen    Case discussed with Dr. Supa Headley under the supervision of Dr. Lisa Castillo. Note finalized on attending attestation 48y (1975) F w/ PMHx significant for multiple prior strokes, HTN presents to the ED with transient L face and arm numbness. Stroke code called. LKW 1 PM 3/18/24. Patient was seated at home when she noted sudden onset of L facial and L arm numbness at around 1 PM. This lasted about 2 minutes and was followed by intermittent periods of L hand tingling. Patient promptly decided to come to the ED due to her history of strokes. CT head was without hemorrhage, mass effect or midline shift. CTA was without flow limiting stenosis, AVM or aneurysm. CTP showed patchy bi-frontal non-correlating areas of decreased perfusion without core infarct. Patient reported resolution of symptoms upon re-evaluation.  Currently patient reports intermittent now-resolved periods of L hand tingling. Patient denies headache, nausea, vomiting, shortness of breath, slurred speech, hearing changes, vision changes, current focal numbness or focal weakness.    Regarding patient's stroke history, in March 2023, the patient presented to [facility] with speech difficulties and balance issues and was found on that admission fo have MRI positive acute infarcts in her L MCA distribution, the L caudate nucleus, R temporal region and L corona radiata. She was discharged on ASA 81 mg daily & Plavix 75 mg daily. She presented to [facility] in October 2023 with R facial droop and slurred speech and was found on that ED visit to have an acute L corona radiata stroke. Both occasions were believed to be ESUS. She had an ILR in place for the 10/2023 which was interrogated and revealed a 2 minute run thought to be compatible with AF though cardiology/ EP review believed this run to be more compatible with sinus rhythm with premature atrial contractions. ILR is still in place. She was planned to follow with Dr. Carlson for further cardiologic evaluation and was discharged from ED with ASA 81 mg & Brilinta 90 mg Daily. She states she has not yet followed up with Dr. Carlson. She has no residual deficits from her prior strokes. She saw neurologist Dr. James Castañeda in March 2023 after her strokes were first discovered. She states she followed up with another provided in the same office due to appointment availability though she was told she would be better evaluated by Dr. Castañeda and has not followed up since.    NIHSS: 1  PreMRS: 0  Patient was not a tenecteplase candidate due to resolving non-disabling deficits  Patient was not a thrombectomy candidate as no LVO on imaging.  Impression:   Transient L facial and arm numbness i/s/o multiple prior cerebral infarcts on MRI. Prior ILR interrogation (10/2023) with 2 mins of AF vs. SR with PAC's. Concerning for R cerebral dysfunction. R/o stroke, ESUS vs. cardioembilic vs. other unknown mechanism    General  [] permissive HTN for 24 hours up to 220/110  [] gradual normotension after 24 hrs  [] telemetry monitoring    Imaging   [] MRI head non contrast  [] STAT repeat CTH if change in neuro status    Meds  [] Continue home ASA 81 mg daily  [] Continue home Brilinta 90 mg daily  [] Continue home Atorvastatin 80 mg daily     Studies  [] EP interrogation of ILR    Labs   [] CBC, BMP  [] Lipid panel  [] HbA1C    Consults:  [] EP for ILR interrogation    Other  [] PT/OT  [] dysphagia screen    Case discussed with Dr. Supa Headley under the supervision of Dr. Lisa Castillo. Note finalized on attending attestation

## 2024-03-18 NOTE — ED ADULT NURSE NOTE - NSFALLUNIVINTERV_ED_ALL_ED
Bed/Stretcher in lowest position, wheels locked, appropriate side rails in place/Call bell, personal items and telephone in reach/Instruct patient to call for assistance before getting out of bed/chair/stretcher/Non-slip footwear applied when patient is off stretcher/[place] to call system/Physically safe environment - no spills, clutter or unnecessary equipment/Purposeful proactive rounding/Room/bathroom lighting operational, light cord in reach

## 2024-03-18 NOTE — ED ADULT TRIAGE NOTE - CHIEF COMPLAINT QUOTE
L lip and tongue numbness with L arm numbness 1 hour PTA. history of 2 strokes. reports symptoms feels better now.

## 2024-03-18 NOTE — ED CDU PROVIDER DISPOSITION NOTE - PROVIDER TOKENS
PROVIDER:[TOKEN:[[id number]],FOLLOWUP:[4-6 Days]],PROVIDER:[TOKEN:[[id number]],FOLLOWUP:[4-6 Days]]

## 2024-03-18 NOTE — CONSULT NOTE ADULT - SUBJECTIVE AND OBJECTIVE BOX
Neurology - Consult Note    -  Spectra: [phone] ([facility]), [phone] ([facility])  -    HPI: NINO MARCH, 48y (1975) F w/ PMHx significant for multiple prior strokes, HTN presents to the ED with transient L face and arm numbness. Stroke code called. LKW 1 PM 3/18/24. Patient was seated at home when she noted sudden onset of L facial and L arm numbness at around 1 PM. This lasted about 2 minutes and was followed by intermittent periods of L hand tingling. Patient promptly decided to come to the ED due to her history of strokes. CT head was without hemorrhage, mass effect or midline shift. CTA showed non-correlating areas of possible stenosis without AVM or aneurysm. CTP showed patchy bi-frontal non-correlating areas of decreased perfusion without core infarct. Patient reported resolution of symptoms upon re-evaluation.  Regarding patient's stroke history, in March 2023, the patient presented to [facility], [place] with speech difficulties and ballance issues and was found on that admission fo have MRI positice acute infarcts in her L MCA distribution, the L caudate nucleus, R temporal region and L corona radiata. She was discharged on ASA 81 mg daily & Plavix 75 mg daily. She presented to [facility] in October 2023 with R facial droop and slurred speech and was found on that ED visit to have an acute L corona radiata stroke. Both occasions were believed to be ESUS. She had an ILR in place for the 10/2023 which was interrogated and revealed a 2 minute run thought to be compatible with AF though cardiology/ EP review believed this run to be more compatible with sinus rhythm with premature atrial contractions. ILR is still in place. She was planned to follow with Dr. Carlson for further cardiologic evaluation and was discharged from ED with ASA 81 mg & Brilinta 90 mg Daily. She states she has not yet followed up with Dr. Carlson. She has no residual deficits from her prior strokes. Currently patient reports intermittent now-resolved periods of L hand tingling. Patient denies headache, nausea, vomiting, shortness of breath, slurred speech, hearing changes, vision changes, current focal numbness or focal weakness.    NIHSS: 1  PreMRS: 0  Patient was not a tenecteplase candidate due to resolving non-disabling deficits  Patient was not a thrombectomy candidate as no LVO on imaging.    Review of Systems:  All other review of systems is negative unless indicated above.    Allergies:  clindamycin (Swelling (Mild); Urticaria)      PMHx/PSHx/Family Hx: As above, otherwise see below   No pertinent past medical history    HTN (hypertension)    Anemia    H/O ischemic left MCA stroke        Social Hx:  No current use of tobacco, alcohol, or illicit drugs    Medications:  MEDICATIONS  (STANDING):    MEDICATIONS  (PRN):      Vitals:  T(C): 36.7 (03-18-24 @ 14:00), Max: 36.7 (03-18-24 @ 14:00)  HR: 84 (03-18-24 @ 14:00) (84 - 88)  BP: 177/65 (03-18-24 @ 14:00) (177/65 - 217/145)  RR: 18 (03-18-24 @ 14:00) (16 - 18)  SpO2: 100% (03-18-24 @ 14:00) (100% - 100%)    Physical Examination:  General - NAD  Cardiovascular - Peripheral pulses palpable, no edema  Eyes - Fundoscopy not performed due to safety precautions in the setting of infection risk    Neurologic Exam:  Mental status - Awake, Alert, Oriented to person, place, and time. Speech fluent, repetition and naming intact. Follows simple and complex commands. Attention/concentration, recent and remote memory (including registration and recall), and fund of knowledge intact    Cranial nerves - PERRLA, VFF, EOMI, face sensation (V1-V3) intact b/l, facial strength intact without asymmetry b/l, hearing intact b/l, palate with symmetric elevation, trapezius 5/5 strength b/l, tongue midline on protrusion with full lateral movement    Motor - Normal bulk and tone throughout. No pronator drift.  Strength testing            R        Deltoid:  5    Biceps:  5    Triceps:  5    Wrist Extension:  5    Wrist Flexion:  5    Interossei:  5    :  5    Hip Flexion:  5    Hip Extension:  5    Knee Flexion:  5    Knee Extension:  5    Dorsiflexion:  5    Plantar Flexion:  5        L        Deltoid:  5    Biceps:  5    Triceps:  5    Wrist Extension:  5    Wrist Flexion:  5    Interossei:  5    :  5    Hip Flexion:  5    Hip Extension:  5    Knee Flexion:  5    Knee Extension:  5    Dorsiflexion:  5    Plantar Flexion:  5      Sensation - Light touch/temperature intact throughout    DTR's -               R  Biceps:  2+    Triceps:  2+    Brachioradialis:  2+    Patellar:  2+    Ankle:  2+    Toes/plantar response:  Down    L  Biceps:  2+    Triceps:  2+    Brachioradialis:  2+    Patellar:  2+    Ankle:  2+    Toes/plantar response:  Down    Coordination - Finger to Nose intact b/l. No tremors appreciated    Gait and station - Gait not tested due to patient safety concerns    Labs:                        13.5   7.22  )-----------( 352      ( 18 Mar 2024 14:18 )             41.2     03-18    137  |  102  |  11  ----------------------------<  90  6.2<HH>   |  23  |  0.76    Ca    9.8      18 Mar 2024 14:18    TPro  9.4<H>  /  Alb  4.4  /  TBili  0.8  /  DBili  x   /  AST  36  /  ALT  12  /  AlkPhos  70  03-18    CAPILLARY BLOOD GLUCOSE      POCT Blood Glucose.: 89 mg/dL (18 Mar 2024 13:57)    LIVER FUNCTIONS - ( 18 Mar 2024 14:18 )  Alb: 4.4 g/dL / Pro: 9.4 g/dL / ALK PHOS: 70 U/L / ALT: 12 U/L / AST: 36 U/L / GGT: x             PT/INR - ( 18 Mar 2024 14:18 )   PT: 11.0 sec;   INR: 1.05 ratio         PTT - ( 18 Mar 2024 14:18 )  PTT:31.8 sec  CSF:                  Radiology:    < from: CT Brain Stroke Protocol (03.18.24 @ 14:17) >  IMPRESSION:    No significant interval change. No acute intracranial bleeding.  Chronic left anterior gangliocapsular infarction.    < end of copied text >   Neurology - Consult Note    -  Spectra: [phone] ([facility]), [phone] ([facility])  -    HPI: NINO MARCH, 48y (1975) F w/ PMHx significant for multiple prior strokes, HTN presents to the ED with transient L face and arm numbness. Stroke code called. LKW 1 PM 3/18/24. Patient was seated at home when she noted sudden onset of L facial and L arm numbness at around 1 PM. This lasted about 2 minutes and was followed by intermittent periods of L hand tingling. Patient promptly decided to come to the ED due to her history of strokes. CT head was without hemorrhage, mass effect or midline shift. CTA showed non-correlating areas of possible stenosis without AVM or aneurysm. CTP showed patchy bi-frontal non-correlating areas of decreased perfusion without core infarct. Patient reported resolution of symptoms upon re-evaluation.  Currently patient reports intermittent now-resolved periods of L hand tingling. Patient denies headache, nausea, vomiting, shortness of breath, slurred speech, hearing changes, vision changes, current focal numbness or focal weakness.    Regarding patient's stroke history, in March 2023, the patient presented to [facility] with speech difficulties and balance issues and was found on that admission fo have MRI positive acute infarcts in her L MCA distribution, the L caudate nucleus, R temporal region and L corona radiata. She was discharged on ASA 81 mg daily & Plavix 75 mg daily. She presented to [facility] in October 2023 with R facial droop and slurred speech and was found on that ED visit to have an acute L corona radiata stroke. Both occasions were believed to be ESUS. She had an ILR in place for the 10/2023 which was interrogated and revealed a 2 minute run thought to be compatible with AF though cardiology/ EP review believed this run to be more compatible with sinus rhythm with premature atrial contractions. ILR is still in place. She was planned to follow with Dr. Carlson for further cardiologic evaluation and was discharged from ED with ASA 81 mg & Brilinta 90 mg Daily. She states she has not yet followed up with Dr. Carlson. She has no residual deficits from her prior strokes. She saw neurologist Dr. James Castañeda in March 2023 after her strokes were first discovered. She states she followed up with another provided in the same office due to appointment availability though she was told she would be better evaluated by Dr. Castañeda and has not followed up since.    NIHSS: 1  PreMRS: 0  Patient was not a tenecteplase candidate due to resolving non-disabling deficits  Patient was not a thrombectomy candidate as no LVO on imaging.    Review of Systems:  All other review of systems is negative unless indicated above.    Allergies:  clindamycin (Swelling (Mild); Urticaria)      PMHx/PSHx/Family Hx: As above, otherwise see below   No pertinent past medical history    HTN (hypertension)    Anemia    H/O ischemic left MCA stroke        Social Hx:  No current use of tobacco, alcohol, or illicit drugs    Medications:  MEDICATIONS  (STANDING):    MEDICATIONS  (PRN):      Vitals:  T(C): 36.7 (03-18-24 @ 14:00), Max: 36.7 (03-18-24 @ 14:00)  HR: 84 (03-18-24 @ 14:00) (84 - 88)  BP: 177/65 (03-18-24 @ 14:00) (177/65 - 217/145)  RR: 18 (03-18-24 @ 14:00) (16 - 18)  SpO2: 100% (03-18-24 @ 14:00) (100% - 100%)    Physical Examination:  General - NAD  Cardiovascular - Peripheral pulses palpable, no edema  Eyes - Fundoscopy not performed due to safety precautions in the setting of infection risk    Neurologic Exam:  Mental status - Awake, Alert, Oriented to person, place, and time. Speech fluent, repetition and naming intact. Follows simple and complex commands. Attention/concentration, recent and remote memory (including registration and recall), and fund of knowledge intact    Cranial nerves - PERRLA, VFF, EOMI, face sensation (V1-V3) intact b/l, facial strength intact without asymmetry b/l, hearing intact b/l, palate with symmetric elevation, trapezius 5/5 strength b/l, tongue midline on protrusion with full lateral movement    Motor - Normal bulk and tone throughout. No pronator drift.  Strength testing            R        Deltoid:  5    Biceps:  5    Triceps:  5    Wrist Extension:  5    Wrist Flexion:  5    Interossei:  5    :  5    Hip Flexion:  5    Hip Extension:  5    Knee Flexion:  5    Knee Extension:  5    Dorsiflexion:  5    Plantar Flexion:  5        L        Deltoid:  5    Biceps:  5    Triceps:  5    Wrist Extension:  5    Wrist Flexion:  5    Interossei:  5    :  5    Hip Flexion:  5    Hip Extension:  5    Knee Flexion:  5    Knee Extension:  5    Dorsiflexion:  5    Plantar Flexion:  5      Sensation - Light touch/temperature intact throughout    DTR's -               R  Biceps:  2+    Triceps:  2+    Brachioradialis:  2+    Patellar:  2+    Ankle:  2+    Toes/plantar response:  Down    L  Biceps:  2+    Triceps:  2+    Brachioradialis:  2+    Patellar:  2+    Ankle:  2+    Toes/plantar response:  Down    Coordination - Finger to Nose intact b/l. No tremors appreciated    Gait and station - Gait not tested due to patient safety concerns    Labs:                        13.5   7.22  )-----------( 352      ( 18 Mar 2024 14:18 )             41.2     03-18    137  |  102  |  11  ----------------------------<  90  6.2<HH>   |  23  |  0.76    Ca    9.8      18 Mar 2024 14:18    TPro  9.4<H>  /  Alb  4.4  /  TBili  0.8  /  DBili  x   /  AST  36  /  ALT  12  /  AlkPhos  70  03-18    CAPILLARY BLOOD GLUCOSE      POCT Blood Glucose.: 89 mg/dL (18 Mar 2024 13:57)    LIVER FUNCTIONS - ( 18 Mar 2024 14:18 )  Alb: 4.4 g/dL / Pro: 9.4 g/dL / ALK PHOS: 70 U/L / ALT: 12 U/L / AST: 36 U/L / GGT: x             PT/INR - ( 18 Mar 2024 14:18 )   PT: 11.0 sec;   INR: 1.05 ratio         PTT - ( 18 Mar 2024 14:18 )  PTT:31.8 sec  CSF:                  Radiology:    < from: CT Brain Stroke Protocol (03.18.24 @ 14:17) >  IMPRESSION:    No significant interval change. No acute intracranial bleeding.  Chronic left anterior gangliocapsular infarction.    < end of copied text >    < from: CT Angio Neck Stroke Protocol w/ IV Cont (03.18.24 @ 14:19) >  IMPRESSION:    CT PERFUSION: Small anterior frontal lobe mismatch perfusion abnormality   measuring 22 cc due to oligemia or ischemia.    CTA BRAIN: Patent intracranial circulation. No flow-limiting stenosis or   occlusion.    CTA NECK: Patent cervical vasculature. No flow limiting stenosis or   occlusion.    Left mandibular molar periapical abscess.    < end of copied text >     Neurology - Consult Note    -  Spectra: [phone] ([facility]), [phone] ([facility])  -    HPI: NINO MARCH, 48y (1975) F w/ PMHx significant for multiple prior strokes, HTN presents to the ED with transient L face and arm numbness. Stroke code called. LKW 1 PM 3/18/24. Patient was seated at home when she noted sudden onset of L facial and L arm numbness at around 1 PM. This lasted about 2 minutes and was followed by intermittent periods of L hand tingling. Patient promptly decided to come to the ED due to her history of strokes. CT head was without hemorrhage, mass effect or midline shift. CTA was without flow limiting stenosis, AVM or aneurysm. CTP showed patchy bi-frontal non-correlating areas of decreased perfusion without core infarct. Patient reported resolution of symptoms upon re-evaluation.  Currently patient reports intermittent now-resolved periods of L hand tingling. Patient denies headache, nausea, vomiting, shortness of breath, slurred speech, hearing changes, vision changes, current focal numbness or focal weakness.    Regarding patient's stroke history, in March 2023, the patient presented to [facility] with speech difficulties and balance issues and was found on that admission fo have MRI positive acute infarcts in her L MCA distribution, the L caudate nucleus, R temporal region and L corona radiata. She was discharged on ASA 81 mg daily & Plavix 75 mg daily. She presented to [facility] in October 2023 with R facial droop and slurred speech and was found on that ED visit to have an acute L corona radiata stroke. Both occasions were believed to be ESUS. She had an ILR in place for the 10/2023 which was interrogated and revealed a 2 minute run thought to be compatible with AF though cardiology/ EP review believed this run to be more compatible with sinus rhythm with premature atrial contractions. ILR is still in place. She was planned to follow with Dr. Carlson for further cardiologic evaluation and was discharged from ED with ASA 81 mg & Brilinta 90 mg Daily. She states she has not yet followed up with Dr. Carlson. She has no residual deficits from her prior strokes. She saw neurologist Dr. James Castañeda in March 2023 after her strokes were first discovered. She states she followed up with another provided in the same office due to appointment availability though she was told she would be better evaluated by Dr. Castañeda and has not followed up since.    NIHSS: 1  PreMRS: 0  Patient was not a tenecteplase candidate due to resolving non-disabling deficits  Patient was not a thrombectomy candidate as no LVO on imaging.    Review of Systems:  All other review of systems is negative unless indicated above.    Allergies:  clindamycin (Swelling (Mild); Urticaria)      PMHx/PSHx/Family Hx: As above, otherwise see below   No pertinent past medical history    HTN (hypertension)    Anemia    H/O ischemic left MCA stroke        Social Hx:  No current use of tobacco, alcohol, or illicit drugs    Medications:  MEDICATIONS  (STANDING):    MEDICATIONS  (PRN):      Vitals:  T(C): 36.7 (03-18-24 @ 14:00), Max: 36.7 (03-18-24 @ 14:00)  HR: 84 (03-18-24 @ 14:00) (84 - 88)  BP: 177/65 (03-18-24 @ 14:00) (177/65 - 217/145)  RR: 18 (03-18-24 @ 14:00) (16 - 18)  SpO2: 100% (03-18-24 @ 14:00) (100% - 100%)    Physical Examination:  General - NAD  Cardiovascular - Peripheral pulses palpable, no edema  Eyes - Fundoscopy not performed due to safety precautions in the setting of infection risk    Neurologic Exam:  Mental status - Awake, Alert, Oriented to person, place, and time. Speech fluent, repetition and naming intact. Follows simple and complex commands. Attention/concentration, recent and remote memory (including registration and recall), and fund of knowledge intact    Cranial nerves - PERRLA, VFF, EOMI, face sensation (V1-V3) intact b/l, facial strength intact without asymmetry b/l, hearing intact b/l, palate with symmetric elevation, trapezius 5/5 strength b/l, tongue midline on protrusion with full lateral movement    Motor - Normal bulk and tone throughout. No pronator drift.  Strength testing            R        Deltoid:  5    Biceps:  5    Triceps:  5    Wrist Extension:  5    Wrist Flexion:  5    Interossei:  5    :  5    Hip Flexion:  5    Hip Extension:  5    Knee Flexion:  5    Knee Extension:  5    Dorsiflexion:  5    Plantar Flexion:  5        L        Deltoid:  5    Biceps:  5    Triceps:  5    Wrist Extension:  5    Wrist Flexion:  5    Interossei:  5    :  5    Hip Flexion:  5    Hip Extension:  5    Knee Flexion:  5    Knee Extension:  5    Dorsiflexion:  5    Plantar Flexion:  5      Sensation - Light touch/temperature intact throughout    DTR's -               R  Biceps:  2+    Triceps:  2+    Brachioradialis:  2+    Patellar:  2+    Ankle:  2+    Toes/plantar response:  Down    L  Biceps:  2+    Triceps:  2+    Brachioradialis:  2+    Patellar:  2+    Ankle:  2+    Toes/plantar response:  Down    Coordination - Finger to Nose intact b/l. No tremors appreciated    Gait and station - Gait not tested due to patient safety concerns    Labs:                        13.5   7.22  )-----------( 352      ( 18 Mar 2024 14:18 )             41.2     03-18    137  |  102  |  11  ----------------------------<  90  6.2<HH>   |  23  |  0.76    Ca    9.8      18 Mar 2024 14:18    TPro  9.4<H>  /  Alb  4.4  /  TBili  0.8  /  DBili  x   /  AST  36  /  ALT  12  /  AlkPhos  70  03-18    CAPILLARY BLOOD GLUCOSE      POCT Blood Glucose.: 89 mg/dL (18 Mar 2024 13:57)    LIVER FUNCTIONS - ( 18 Mar 2024 14:18 )  Alb: 4.4 g/dL / Pro: 9.4 g/dL / ALK PHOS: 70 U/L / ALT: 12 U/L / AST: 36 U/L / GGT: x             PT/INR - ( 18 Mar 2024 14:18 )   PT: 11.0 sec;   INR: 1.05 ratio         PTT - ( 18 Mar 2024 14:18 )  PTT:31.8 sec  CSF:                  Radiology:    < from: CT Brain Stroke Protocol (03.18.24 @ 14:17) >  IMPRESSION:    No significant interval change. No acute intracranial bleeding.  Chronic left anterior gangliocapsular infarction.    < end of copied text >    < from: CT Angio Neck Stroke Protocol w/ IV Cont (03.18.24 @ 14:19) >  IMPRESSION:    CT PERFUSION: Small anterior frontal lobe mismatch perfusion abnormality   measuring 22 cc due to oligemia or ischemia.    CTA BRAIN: Patent intracranial circulation. No flow-limiting stenosis or   occlusion.    CTA NECK: Patent cervical vasculature. No flow limiting stenosis or   occlusion.    Left mandibular molar periapical abscess.    < end of copied text >

## 2024-03-18 NOTE — ED ADULT NURSE NOTE - OBJECTIVE STATEMENT
49 y/o female, A&O x4, PMH HLD, HTN, Stroke 3/2023 and 10/2023 on ASA and brilinta, AFib w/ loop recorder presents to the ED c/o left lower facial numbness and LUE numbness, code stroke activated. Pt endorses numbness began at 1300 and improved after minutes, but was nervous because of previous strokes. Pt states a frontal HA for the past 2 days. Pt affect is calm and appropriate, spontaneous unlabored breathing, abdomen soft nondistended nontender, equal strength sensation and ROM bilaterally, PERRLA, ambulation w/o difficulty. Pt denies chest pain, SOB/difficulty breathing, fever/chills, abd pain, N/V/D, lightheadedness/dizziness. Pt placed on continuous cardiac monitoring, safety and comfort measures maintained.

## 2024-03-18 NOTE — ED CDU PROVIDER INITIAL DAY NOTE - ATTENDING APP SHARED VISIT CONTRIBUTION OF CARE
Attending MD Anglin:   I personally have seen and examined this patient.  Physician assistant note reviewed and agree on plan of care and except where noted.  See below for details.     Seen in CT/Hallway, CODE STROKE    48F with PMH/PSH including AFib with ILR, HTN, HLD, prior CVAs (3/2023, 10/2023) on ASA and Brilinta (previously on AC), presents to the ED with L lower facial, L tongue and LUE numbness at 1pm.  Reports facial numbness improved after a few minutes, reports persistent LUE numbness.  Denies difficulty speaking.  Denies weakness, inability to ambulate. Denies loss of urinary or bowel continence. Denies change in vision, double vision, sudden loss of vision. Denies chest pain, shortness of breath, abdominal pain, nausea, vomiting, diarrhea, urinary complaints, fevers.    Exam:   General: NAD  HENT: head NCAT, airway patent, no facial droop  Eyes: anicteric, no conjunctival injection   Lungs: lungs CTAB with good inspiratory effort, no wheezing, no rhonchi, no rales  Cardiac: +S1S2, no obvious m/r/g  GI: abdomen soft with +BS, NT, ND  MSK: ranging neck and extremities freely  Neuro: moving all extremities spontaneously with 5/5 strength, nonfocal, CN 2-12 grossly intact, decreased sensation to LUE  Psych: normal mood and affect    A/P: 48F with L facial numbness (resolved), LUE decreased sensation (persistent), CODE STROKE, will obtain labs, CTH/CTAHN, neuro at bedside    CDU ADDENDUM: MRI, frequent re-eval, neuro checks, final neuro recs

## 2024-03-18 NOTE — CONSULT NOTE ADULT - ATTENDING COMMENTS
DOS 3/19  code stroke called on arrival and neurology emergently assessed patient   Briefly   48y (1975) F w/ multiple prior strokes, HTN presents to the ED with transient L face and arm numbness. Stroke code called. LKW 1 PM 3/18/24. Patient was seated at home when she noted sudden onset of L facial and L arm numbness at around 1 PM. This lasted about 2 minutes and was followed by intermittent periods of L hand tingling. Patient promptly decided to come to the ED due to her history of strokes.  CT head was without hemorrhage, mass effect or midline shift. chronic L gangliocapsular infarct   CTA was without flow limiting stenosis, AVM or aneurysm.   CTP showed patchy bi-frontal non-correlating areas of decreased perfusion without core infarct.   MRI brain with old L CR and L putamen infarct. also R posterior putamen old infarct. no new strokes     A1c 5.5   Patient reported resolution of symptoms upon re-evaluation.        Regarding patient's stroke history, in March 2023, the patient presented to [facility] with speech difficulties and balance issues and was found on that admission fo have MRI positive acute infarcts in her L MCA distribution, the L caudate nucleus, R temporal region and L corona radiata. She was discharged on ASA 81 mg daily & Plavix 75 mg daily. She presented to [facility] in October 2023 with R facial droop and slurred speech and was found on that ED visit to have an acute L corona radiata stroke. Both occasions were believed to be ESUS. She had an ILR in place for the 10/2023 which was interrogated and revealed a 2 minute run thought to be compatible with AF though cardiology/ EP review believed this run to be more compatible with sinus rhythm with premature atrial contractions. ILR is still in place. She was planned to follow with Dr. Carlson for further cardiologic evaluation and was discharged from ED with ASA 81 mg & Brilinta 90 mg Daily. She states she has not yet followed up with Dr. Carlson. She has no residual deficits from her prior strokes. She saw neurologist Dr. James Castañeda in March 2023 after her strokes were first discovered. She states she followed up with another provided in the same office due to appointment availability though she was told she would be better evaluated by Dr. Castañeda and has not followed up since.    NIHSS: 1  PreMRS: 0  Patient was not a tenecteplase candidate due to resolving non-disabling deficits  Patient was not a thrombectomy candidate as no LVO on imaging.    Impression:   1) multiple chronic strokes, ESUS now s/p ILR  2) possibel new TIA        - Dual antiplatelet therapy with ASA 81mg PO daily and brillinta 90mg BID, home med .  - High dose statin therapy - atorvastatin 80mg PO daily. LDL goal <70mg/dL.  - ILR interrogation.  if truly has AF would stop antiplatelets and start DOAC.  f/u cardio   - TTE can be outpatient   - telemetry  - PT/OT/SS/SLP, OOBC; no PT needs   - check FS, glucose control <180  - GI/DVT ppx  - Counseling on diet, exercise, and medication adherence was done  - Counseling on smoking cessation and alcohol consumption offered when appropriate.  - Pain assessed and judicious use of narcotics when appropriate was discussed.    - Stroke education given when appropriate.  - Importance of fall prevention discussed.   - Differential diagnosis and plan of care discussed with patient and/or family and primary team  - Thank you for allowing me to participate in the care of this patient. Call with questions.   outpatient f/u on discharge   Julio Gregg MD  Vascular Neurology  Office: [phone]

## 2024-03-18 NOTE — ED CDU PROVIDER DISPOSITION NOTE - CLINICAL COURSE
49 yo F with a PMH of multiple prior strokes (03/2023 and 10/2023- w/o residual deficits), HTN p/w transient L face and arm numbness. Stroke code called. LKW 1 PM 3/18/24. Patient was seated at home when she noted sudden onset of L facial and L arm numbness at around 1 PM. This lasted about 2 minutes and was followed by intermittent periods of L hand tingling. Patient promptly decided to come to the ED due to her history of strokes. Denies nausea, vomiting, fever, chills, shortness of breath, headache, slurred speech, hearing changes, vision changes, extremity weakness, gait abnormality, facial asymmetry, aphasia.  Of note, pt has ILR in place and is on ASA 81 mg & Brilinta 90 mg daily. Follows with Neurology, Dr. James Castañeda but has not seen her since last year.   In the ED, pt was a stroke code. VS WNL. Labs notable for hemolyzed K, pending repeat. EKG non ischemic and no e/o arrhythmia. CT head without hemorrhage, mass effect or midline shift. CTA showed non-correlating areas of possible stenosis without AVM or aneurysm. CTP showed patchy bi-frontal non-correlating areas of decreased perfusion without core infarct. Plan for CDU for cntd monitoring, neuro checks and MRI brain non con.  In the CDU**** 49 yo F with a PMH of multiple prior strokes (03/2023 and 10/2023- w/o residual deficits), HTN p/w transient L face and arm numbness. Stroke code called. LKW 1 PM 3/18/24. Patient was seated at home when she noted sudden onset of L facial and L arm numbness at around 1 PM. This lasted about 2 minutes and was followed by intermittent periods of L hand tingling. Patient promptly decided to come to the ED due to her history of strokes. Denies nausea, vomiting, fever, chills, shortness of breath, headache, slurred speech, hearing changes, vision changes, extremity weakness, gait abnormality, facial asymmetry, aphasia.  Of note, pt has ILR in place and is on ASA 81 mg & Brilinta 90 mg daily. Follows with Neurology, Dr. James Castañeda but has not seen her since last year.   In the ED, pt was a stroke code. VS WNL. Labs notable for hemolyzed K, pending repeat. EKG non ischemic and no e/o arrhythmia. CT head without hemorrhage, mass effect or midline shift. CTA showed non-correlating areas of possible stenosis without AVM or aneurysm. CTP showed patchy bi-frontal non-correlating areas of decreased perfusion without core infarct. Plan for CDU for cntd monitoring, neuro checks and MRI brain non con.  In the CDU patient had complete resolution of symptoms without recurrence. MRI performed, no acute infarction.  Noted small basal ganglia remote deep infarctions and scattered cerebral hemispheric white matter lesions.  Patient seen by strok neurology attending Dr. Gregg who cleared from neurology standpoint for discharge home pending EP interrogation of ILR to determine if DC home on Eliquis in the event of an A-fib episode.  EP interrogated ILR and found no A-fib events, pt has been in sinus rhythm.  Given this patient was discharged home on her current regimen of DAPT with outpatient follow-up.  Case discussed with ED attending faizan Anglin for discharge home with outpatient follow up.

## 2024-03-18 NOTE — ED CDU PROVIDER INITIAL DAY NOTE - PHYSICAL EXAMINATION
CONSTITUTIONAL: Well appearing and in no apparent distress. Sitting up eating outside food brought to her by her son.   ENT: Airway patent, moist mucous membranes.   EYES: Pupils equal, round and reactive to light. EOMI. Conjunctiva normal appearing.   CARDIAC: Normal rate, regular rhythm.  Heart sounds S1, S2.    RESPIRATORY: Breath sounds clear and equal bilaterally.   GASTROINTESTINAL: Abdomen soft, non-tender, not distended.  MUSCULOSKELETAL: Spine appears normal.  NEUROLOGICAL: Alert and oriented x3, no focal deficits, no motor or sensory deficits. 5/5 muscle strength throughout. Non focal exam. Speech clear, face symmetric.

## 2024-03-18 NOTE — ED PROVIDER NOTE - CLINICAL SUMMARY MEDICAL DECISION MAKING FREE TEXT BOX
48-year-old female history of hypertension, hyperlipidemia, prior stroke in March 2023 and Oct 2023 on aspirin and brilinta, afib with loop recorder presents with left lower facial numbness left upper extremity numbness that occurred at approximately 1300, improved after few minutes.  States endorsed frontal headache the past 2 days.  Denies fevers, chest pain, palpitations, shortness of breath, abdominal pain, nausea, vomiting, urinary symptoms. Decreased sensation in left hand. Code stroke called, will reassess pending imaging and labs.

## 2024-03-18 NOTE — ED CDU PROVIDER DISPOSITION NOTE - PATIENT PORTAL LINK FT
You can access the FollowMyHealth Patient Portal offered by [facility] by registering at the following website: [email or web address]. By joining [employer]’s FollowMyHealth portal, you will also be able to view your health information using other applications (apps) compatible with our system.

## 2024-03-18 NOTE — ED PROVIDER NOTE - ATTENDING CONTRIBUTION TO CARE
Attending MD Anglin: I personally have seen and examined this patient.  Resident note reviewed and agree on plan of care and except where noted.  See below for details.     Seen in CT/Hallway, CODE STROKE    48F with PMH/PSH including AFib with ILR, HTN, HLD, prior CVAs (3/2023, 10/2023) on ASA and Brilinta (previously on AC), presents to the ED with L lower facial, L tongue and LUE numbness at 1pm.  Reports facial numbness improved after a few minutes, reports persistent LUE numbness.  Denies difficulty speaking.  Denies weakness, inability to ambulate. Denies loss of urinary or bowel continence. Denies change in vision, double vision, sudden loss of vision. Denies chest pain, shortness of breath, abdominal pain, nausea, vomiting, diarrhea, urinary complaints, fevers.    Exam:   General: NAD  HENT: head NCAT, airway patent, no facial droop  Eyes: anicteric, no conjunctival injection   Lungs: lungs CTAB with good inspiratory effort, no wheezing, no rhonchi, no rales  Cardiac: +S1S2, no obvious m/r/g  GI: abdomen soft with +BS, NT, ND  MSK: ranging neck and extremities freely  Neuro: moving all extremities spontaneously with 5/5 strength, nonfocal, CN 2-12 grossly intact, decreased sensation to LUE  Psych: normal mood and affect    A/P: 48F with L facial numbness (resolved), LUE decreased sensation (persistent), CODE STROKE, will obtain labs, CTH/CTAHN, neuro at bedside

## 2024-03-18 NOTE — ED PROVIDER NOTE - PROGRESS NOTE DETAILS
Discussed with neuro, possible embolic stroke 2/2 afib. Patient not currently on eliquis, recommending eliquis instead of aspirin and brilinta, also recommending MRI. Will discuss with CDU.  -Kulwant Nino PGY-2 Attending MD Anglin: Received call from neuro, continue ASA/Brilinta, do NOT start Eliquis until ILR interrogated.

## 2024-03-18 NOTE — ED CDU PROVIDER INITIAL DAY NOTE - PROGRESS NOTE DETAILS
Spoke with Cards/EP, given one service after 5p, deferred ILR interrogation to AM given non emergent. Will page EP in the AM to have device interrogated. Pt NSR on cardiac monitor at this time. Arnie Garcia PA-C CDU PROGRESS NOTE YUNOIR RESENDIZ: Received pt at 1900 sign-out. Pt resting in stretcher in NAD. Case/plan reviewed. VSS. Pt is aox3, speaking coherently, no focal neuro deficits. Transient L facial and arm numbness resolved. Physical exam unremarkable. Neuro recs appreciated, will continue to monitor, MRI pending in am. CDU PROGRESS NOTE YUNIOR RESENDIZ: Received pt at 1900 sign-out. Pt resting in stretcher in NAD. Case/plan reviewed. Pt is aox3, speaking coherently, no focal neuro deficits. Transient L facial and arm numbness resolved. Physical exam unremarkable. Neuro recs appreciated, will continue to monitor, MRI pending in am.

## 2024-03-18 NOTE — ED CDU PROVIDER DISPOSITION NOTE - NSFOLLOWUPINSTRUCTIONS_ED_ALL_ED_FT
Please make sure to follow up with your primary care doctor within 1-2 days and with the NEUROLOGY specialist. The information for follow up can be found below. Bring a copy of all of your results with you to your follow up appointments.   Return to the ER as discussed if you develop any new or worsening symptoms.    Continue all of your medications as prescribed.     NEUROLOGY**** Follow-up with your neurologist Dr. Don within 1 week.  Please call to schedule an appointment.    Continue your current home medications as previously prescribed.    Recommend outpatient follow-up with cardiologist Dr. Harrison.    Return to the Emergency Department immediately if you develop any new/worsening symptoms including numbness/tingling, speech/visual changes, vomiting, dizziness/lightheadedness, chest pain, shortness of breath. Follow-up with your neurologist Dr. Don or neurologist Dr. Gregg within 1 week for further outpatient evaluation and workup.  Please call to schedule an appointment.    Julio Gregg  Neurology  [address], [address]  [place] [address]  Phone: [phone]  Follow Up Time: 4-6 Days    James Castañeda  Neurology  [address]  [place] [address]  Phone: [phone]  Follow Up Time: 4-6 Days    Continue your current home medications as previously prescribed including your baby aspirin, brilinta and atorvastatin.     Continue outpatient follow up with your cardiologist as scheduled.    Continue your previously prescribed aspirin, Brilinta and atorvastatin.    Additionally recommend outpatient dental evaluation and follow-up regarding dental finding seen on your CT scan.  Please call to schedule an appointment.    Oral & Maxillofacial Surgery  Department of Dental Medicine  [address]  [place] [address]  Phone: [phone]  Follow Up Time: 4-6 Days    Return to the Emergency Department immediately if you develop any new/worsening symptoms including numbness/tingling, speech/visual changes, vomiting, dizziness/lightheadedness, chest pain, shortness of breath or any other concerns.

## 2024-03-18 NOTE — ED CDU PROVIDER INITIAL DAY NOTE - OBJECTIVE STATEMENT
49 yo F with a PMH of multiple prior strokes (03/2023 and 10/2023- w/o residual deficits), HTN p/w transient L face and arm numbness. Stroke code called. LKW 1 PM 3/18/24. Patient was seated at home when she noted sudden onset of L facial and L arm numbness at around 1 PM. This lasted about 2 minutes and was followed by intermittent periods of L hand tingling. Patient promptly decided to come to the ED due to her history of strokes. Denies nausea, vomiting, fever, chills, shortness of breath, headache, slurred speech, hearing changes, vision changes, extremity weakness, gait abnormality, facial asymmetry, aphasia.  Of note, pt has ILR in place and is on ASA 81 mg & Brilinta 90 mg daily. Follows with Neurology, Dr. James Castañeda but has not seen her since last year.

## 2024-03-18 NOTE — ED CDU PROVIDER DISPOSITION NOTE - NSFOLLOWUPCLINICS_GEN_ALL_ED_FT
Oral & Maxillofacial Surgery  Department of Dental Medicine  [phone] [place] [address]  Phone: [phone]  Fax: [phone]  Follow Up Time: 4-6 Days

## 2024-03-18 NOTE — ED CDU PROVIDER DISPOSITION NOTE - ATTENDING APP SHARED VISIT CONTRIBUTION OF CARE
Attending MD Anglin:   I personally have seen and examined this patient.  Physician assistant note reviewed and agree on plan of care and except where noted.  See below for details.     Seen in CT/Hallway, CODE STROKE    48F with PMH/PSH including AFib with ILR, HTN, HLD, prior CVAs (3/2023, 10/2023) on ASA and Brilinta (previously on AC), presents to the ED with L lower facial, L tongue and LUE numbness at 1pm.  Reports facial numbness resolved, feels at baseline.  Denies difficulty speaking.  Denies weakness, inability to ambulate. Denies loss of urinary or bowel continence. Denies change in vision, double vision, sudden loss of vision. Denies chest pain, shortness of breath, abdominal pain, nausea, vomiting, diarrhea, urinary complaints, fevers.    Exam:   General: NAD  HENT: head NCAT, airway patent, no facial droop  Eyes: anicteric, no conjunctival injection   Lungs: lungs CTAB with good inspiratory effort, no wheezing, no rhonchi, no rales  Cardiac: +S1S2, no obvious m/r/g  GI: abdomen soft with +BS, NT, ND  MSK: ranging neck and extremities freely  Neuro: moving all extremities spontaneously with 5/5 strength, nonfocal, CN 2-12 grossly intact, decreased sensation to LUE  Psych: normal mood and affect    A/P: 48F with L facial numbness (resolved), MRI nonactionable, ILR interrogated and without AFib events, stable for discharge.  Follow up instructions given, importance of follow up emphasized, return to ED parameters reviewed and patient verbalized understanding.  All questions answered, all concerns addressed.

## 2024-03-19 VITALS
RESPIRATION RATE: 18 BRPM | DIASTOLIC BLOOD PRESSURE: 94 MMHG | OXYGEN SATURATION: 97 % | TEMPERATURE: 98 F | HEART RATE: 69 BPM | SYSTOLIC BLOOD PRESSURE: 165 MMHG

## 2024-03-19 LAB
A1C WITH ESTIMATED AVERAGE GLUCOSE RESULT: 5.5 % — SIGNIFICANT CHANGE UP (ref 4–5.6)
CHOLEST SERPL-MCNC: 248 MG/DL — HIGH
ESTIMATED AVERAGE GLUCOSE: 111 MG/DL — SIGNIFICANT CHANGE UP (ref 68–114)
HDLC SERPL-MCNC: 63 MG/DL — SIGNIFICANT CHANGE UP
LIPID PNL WITH DIRECT LDL SERPL: 176 MG/DL — HIGH
NON HDL CHOLESTEROL: 185 MG/DL — HIGH
TRIGL SERPL-MCNC: 53 MG/DL — SIGNIFICANT CHANGE UP

## 2024-03-19 PROCEDURE — 85025 COMPLETE CBC W/AUTO DIFF WBC: CPT

## 2024-03-19 PROCEDURE — 80061 LIPID PANEL: CPT

## 2024-03-19 PROCEDURE — 70551 MRI BRAIN STEM W/O DYE: CPT | Mod: MC

## 2024-03-19 PROCEDURE — 83605 ASSAY OF LACTIC ACID: CPT

## 2024-03-19 PROCEDURE — 93291 INTERROG DEV EVAL SCRMS IP: CPT | Mod: 26

## 2024-03-19 PROCEDURE — 84295 ASSAY OF SERUM SODIUM: CPT

## 2024-03-19 PROCEDURE — 84702 CHORIONIC GONADOTROPIN TEST: CPT

## 2024-03-19 PROCEDURE — 70498 CT ANGIOGRAPHY NECK: CPT | Mod: MC

## 2024-03-19 PROCEDURE — 80048 BASIC METABOLIC PNL TOTAL CA: CPT

## 2024-03-19 PROCEDURE — 85014 HEMATOCRIT: CPT

## 2024-03-19 PROCEDURE — 70551 MRI BRAIN STEM W/O DYE: CPT | Mod: 26,MC

## 2024-03-19 PROCEDURE — 83036 HEMOGLOBIN GLYCOSYLATED A1C: CPT

## 2024-03-19 PROCEDURE — 70496 CT ANGIOGRAPHY HEAD: CPT | Mod: MC

## 2024-03-19 PROCEDURE — 70450 CT HEAD/BRAIN W/O DYE: CPT | Mod: MC

## 2024-03-19 PROCEDURE — 99285 EMERGENCY DEPT VISIT HI MDM: CPT | Mod: 25

## 2024-03-19 PROCEDURE — 0042T: CPT | Mod: MC

## 2024-03-19 PROCEDURE — 93005 ELECTROCARDIOGRAM TRACING: CPT

## 2024-03-19 PROCEDURE — 84484 ASSAY OF TROPONIN QUANT: CPT

## 2024-03-19 PROCEDURE — 99238 HOSP IP/OBS DSCHRG MGMT 30/<: CPT

## 2024-03-19 PROCEDURE — 85018 HEMOGLOBIN: CPT

## 2024-03-19 PROCEDURE — G0378: CPT

## 2024-03-19 PROCEDURE — 82803 BLOOD GASES ANY COMBINATION: CPT

## 2024-03-19 PROCEDURE — 82435 ASSAY OF BLOOD CHLORIDE: CPT

## 2024-03-19 PROCEDURE — 82947 ASSAY GLUCOSE BLOOD QUANT: CPT

## 2024-03-19 PROCEDURE — 85730 THROMBOPLASTIN TIME PARTIAL: CPT

## 2024-03-19 PROCEDURE — 84132 ASSAY OF SERUM POTASSIUM: CPT

## 2024-03-19 PROCEDURE — 82962 GLUCOSE BLOOD TEST: CPT

## 2024-03-19 PROCEDURE — 82330 ASSAY OF CALCIUM: CPT

## 2024-03-19 PROCEDURE — 85610 PROTHROMBIN TIME: CPT

## 2024-03-19 PROCEDURE — 80053 COMPREHEN METABOLIC PANEL: CPT

## 2024-03-19 RX ADMIN — VALSARTAN 160 MILLIGRAM(S): 80 TABLET ORAL at 05:15

## 2024-03-19 RX ADMIN — AMLODIPINE BESYLATE 10 MILLIGRAM(S): 2.5 TABLET ORAL at 05:16

## 2024-03-19 NOTE — ED ADULT NURSE REASSESSMENT NOTE - NS ED NURSE REASSESS COMMENT FT1
10.30 Pt is reevaluated by YUNIOR dixon . pt is feeling better.  Pt is discharged . Ml out YUNIOR dixon explained the follow up care & gave the discharge summary  . Pt has stable vitals steady gait A&OX 4 at the time of Discharge
17.30 Received the Pt from  RN Friend Carmita Pt is Observed for Left facial numbness which is resolved now for MRI . Received the Pt A&OX 4  Pt obeys commands Barbi N/V/D fever chills cp SOB   Comfort care & safety measures continued  IV site looks clean & dry no signs of infiltration noted pt denies  pain IV site .Pt is oriented to the unit Plan of care explained .  Pt is advised to call for help  call bell with in the reach pt verbalized the understanding .  . GCS 15/15 A&OX 4 PERRLA  size 3 Strong upper & lower extremities steady gait  Pt is ambulatory & independent   No facial droop  No Hand Leg drop denies numbness tingling   MRI forms are faxed Plan of care ongoing
07.00 Received the Pt from  PIPER Winston . Pt is Observed for Left facial numbness which is resolved now awaiting for MRI results & EP consult . Received the Pt A&OX 4  Pt obeys commands Barbi N/V/D fever chills cp SOB   Comfort care & safety measures continued  IV site looks clean & dry no signs of infiltration noted pt denies  pain IV site .Pt is oriented to the unit Plan of care explained .  Pt is advised to call for help  call bell with in the reach pt verbalized the understanding . Evaluated by  CDU MD Linnette Ríos & neuro team  . GCS 15/15 A&OX 4 PERRLA  size 3 Strong upper & lower extremities steady gait  Pt is ambulatory & independent   No facial droop  No Hand Leg drop denies numbness tingling  Plan of care ongoing

## 2024-03-19 NOTE — ED CDU PROVIDER SUBSEQUENT DAY NOTE - TEMPLATE, MLM
Visited with pt and family of pt at the [facility] Children Winter Event. Assured family of ongoing  support and availability. Rev.  Cheng Rehman, [address] [facility]  Pediatric Specialty  with [facility]  Please call [phone] for any further pastoral care needs   or [phone] to reach [facility]
General

## 2024-03-19 NOTE — ED ADULT NURSE REASSESSMENT NOTE - NSFALLUNIVINTERV_ED_ALL_ED
Bed/Stretcher in lowest position, wheels locked, appropriate side rails in place/Call bell, personal items and telephone in reach/Instruct patient to call for assistance before getting out of bed/chair/stretcher/Non-slip footwear applied when patient is off stretcher/[place] to call system/Physically safe environment - no spills, clutter or unnecessary equipment/Purposeful proactive rounding/Room/bathroom lighting operational, light cord in reach

## 2024-03-19 NOTE — ED CDU PROVIDER SUBSEQUENT DAY NOTE - HISTORY
CDU PROGRESS NOTE PA MARTÍN: Pt is aox3, speaking coherently, no focal neuro deficits. Transient L facial and arm numbness resolved. Physical exam unremarkable. Neuro recs appreciated, will continue to monitor, MRI pending in am. CT also showed Periapical lucency associated with left mandibular third molar due to periodontal disease and probable underlying periapical abscess. Pt asymptomatic, exam no signs of abscess. c/d/w Dental, recommend outpatient f/u in clinic for further evaluation.

## 2024-03-19 NOTE — PROCEDURE NOTE - ADDITIONAL PROCEDURE DETAILS
1) Indication for loop recorder interrogation: p/w stroke-like symptoms  2) Presenting rhythm: Sinus Rhythm in the 60's  3) Stored data revealed no events for review. Last interrogation was 10/12/2023.  4) AT/AF burden: 0%  5) Discussed with ED provider.    INDIRA Sadler NP-C  [id number]

## 2024-04-11 NOTE — ED PROVIDER NOTE - NSICDXPASTSURGICALHX_GEN_ALL_CORE_FT
- Azithromycin and Prednisone for 5 days  -ProAir as needed for coughing spells  - push fluids  - use humidifier  - return if worsening symptoms   
Department of Anesthesiology  Postprocedure Note    Patient: Andrew Chaudhari  MRN: [id number]  YOB: 1970  Date of evaluation: 9/1/2020  Time:  7:52 AM     Procedure Summary     Date:  09/01/20 Room / Location:  [facility] OR  / [facility]    Anesthesia Start:  0037 Anesthesia Stop:      Procedure:  COLORECTAL CANCER SCREENING, HIGH RISK (N/A ) Diagnosis:  (History of colon polyps Z86.010)    Surgeon:  Marisa Norman MD Responsible Provider:  SUZETTE Brewer CRNA    Anesthesia Type:  MAC ASA Status:  3          Anesthesia Type: MAC    Vanessa Phase I: Vanessa Score: 10    Vanessa Phase II:      Last vitals: Reviewed and per EMR flowsheets.        Anesthesia Post Evaluation    Patient location during evaluation: bedside  Patient participation: complete - patient participated  Level of consciousness: awake and awake and alert  Pain score: 0  Airway patency: patent  Nausea & Vomiting: no nausea and no vomiting  Complications: no  Cardiovascular status: blood pressure returned to baseline and hemodynamically stable  Respiratory status: acceptable and spontaneous ventilation  Hydration status: euvolemic
PAST SURGICAL HISTORY:  H/O dilation and curettage

## 2025-01-01 NOTE — STROKE CODE NOTE - PATIENT LAST KNOWN
General Surgery History and Physical  [facility]    Patient's Name/Date of Birth: Sumanth Ashraf / 1985    Date: January 3, 2025     Surgeon: Alfredo Joyce MD    PCP: Boris Morrison DO     Chief Complaint: Right upper quadrant pain    HPI:   Sumanth Ashraf is a 39 y.o. female who presents for evaluation of right upper quadrant pain.  Was recently seen in ED after She had [employer] one night and went to sleep.  She woke up from a dead sleep nextt morning with sharp right upper quadrant pain that would not subside.  She came into the emergency room where she was found to have labs within normal limits.  CT of the abdomen pelvis revealed cholelithiasis with a distended gallbladder with mild wall thickening and mild intra and extrahepatic biliary ductal dilation.  I was consulted for further evaluation and management.  She denies any prior similar episodes.  She has had umbilical hernia repair performed as a child. She was discharged home as symptoms were controlled and return for elective cholecystectomy        Patient Active Problem List   Diagnosis    Recurrent pregnancy loss, antepartum condition or complication    MTHFR mutation    Obesity (BMI 30-39.9)    Marijuana abuse    Former smoker    Coagulation defect affecting pregnancy, antepartum (HCC)    Obesity complicating pregnancy, childbirth, or puerperium, antepartum    Short cervix affecting pregnancy    Cervical shortening, antepartum condition or complication    Antepartum drug dependence (HCC)    Poor fetal growth, affecting management of mother, antepartum condition or complication    Hyperemesis affecting pregnancy, antepartum    Acute cholecystitis    Cholelithiasis with cholecystitis       Past Medical History:   Diagnosis Date    Antepartum drug dependence (HCC) 6/12/2012    Pregnancy     Urinary tract infection during pregnancy     UTI (lower urinary tract infection)     recovering from       Past Surgical History:  Known
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
## 2025-08-13 ENCOUNTER — NON-APPOINTMENT (OUTPATIENT)
Age: 50
End: 2025-08-13

## 2025-08-13 ENCOUNTER — APPOINTMENT (OUTPATIENT)
Dept: INTERNAL MEDICINE | Facility: CLINIC | Age: 50
End: 2025-08-13
Payer: COMMERCIAL

## 2025-08-13 VITALS
SYSTOLIC BLOOD PRESSURE: 144 MMHG | WEIGHT: 182 LBS | DIASTOLIC BLOOD PRESSURE: 100 MMHG | HEART RATE: 53 BPM | HEIGHT: 65.5 IN | BODY MASS INDEX: 29.96 KG/M2 | OXYGEN SATURATION: 98 %

## 2025-08-13 DIAGNOSIS — Z78.9 OTHER SPECIFIED HEALTH STATUS: ICD-10-CM

## 2025-08-13 DIAGNOSIS — Z12.11 ENCOUNTER FOR SCREENING FOR MALIGNANT NEOPLASM OF COLON: ICD-10-CM

## 2025-08-13 DIAGNOSIS — Z00.00 ENCOUNTER FOR GENERAL ADULT MEDICAL EXAMINATION W/OUT ABNORMAL FINDINGS: ICD-10-CM

## 2025-08-13 DIAGNOSIS — Z11.3 ENCOUNTER FOR SCREENING FOR INFECTIONS WITH A PREDOMINANTLY SEXUAL MODE OF TRANSMISSION: ICD-10-CM

## 2025-08-13 DIAGNOSIS — Z82.49 FAMILY HISTORY OF ISCHEMIC HEART DISEASE AND OTHER DISEASES OF THE CIRCULATORY SYSTEM: ICD-10-CM

## 2025-08-13 DIAGNOSIS — Z12.31 ENCOUNTER FOR SCREENING MAMMOGRAM FOR MALIGNANT NEOPLASM OF BREAST: ICD-10-CM

## 2025-08-13 DIAGNOSIS — Z83.3 FAMILY HISTORY OF DIABETES MELLITUS: ICD-10-CM

## 2025-08-13 DIAGNOSIS — Z80.0 FAMILY HISTORY OF MALIGNANT NEOPLASM OF DIGESTIVE ORGANS: ICD-10-CM

## 2025-08-13 DIAGNOSIS — Z82.5 FAMILY HISTORY OF ASTHMA AND OTHER CHRONIC LOWER RESPIRATORY DISEASES: ICD-10-CM

## 2025-08-13 DIAGNOSIS — I10 ESSENTIAL (PRIMARY) HYPERTENSION: ICD-10-CM

## 2025-08-13 DIAGNOSIS — I63.9 CEREBRAL INFARCTION, UNSPECIFIED: ICD-10-CM

## 2025-08-13 DIAGNOSIS — S69.90XA UNSPECIFIED INJURY OF UNSPECIFIED WRIST, HAND AND FINGER(S), INITIAL ENCOUNTER: ICD-10-CM

## 2025-08-13 DIAGNOSIS — Z83.438 FAMILY HISTORY OF OTHER DISORDER OF LIPOPROTEIN METABOLISM AND OTHER LIPIDEMIA: ICD-10-CM

## 2025-08-13 DIAGNOSIS — Z82.3 FAMILY HISTORY OF STROKE: ICD-10-CM

## 2025-08-13 DIAGNOSIS — Z23 ENCOUNTER FOR IMMUNIZATION: ICD-10-CM

## 2025-08-13 PROCEDURE — 99396 PREV VISIT EST AGE 40-64: CPT | Mod: 25

## 2025-08-13 PROCEDURE — 90471 IMMUNIZATION ADMIN: CPT

## 2025-08-13 PROCEDURE — 93000 ELECTROCARDIOGRAM COMPLETE: CPT

## 2025-08-13 PROCEDURE — 90715 TDAP VACCINE 7 YRS/> IM: CPT

## 2025-08-13 PROCEDURE — 36415 COLL VENOUS BLD VENIPUNCTURE: CPT

## 2025-08-13 RX ORDER — AMLODIPINE BESYLATE 10 MG/1
10 TABLET ORAL
Refills: 0 | Status: DISCONTINUED | COMMUNITY
End: 2025-08-13

## 2025-08-14 ENCOUNTER — NON-APPOINTMENT (OUTPATIENT)
Age: 50
End: 2025-08-14

## 2025-08-14 RX ORDER — ATORVASTATIN CALCIUM 40 MG/1
40 TABLET, FILM COATED ORAL
Qty: 90 | Refills: 1 | Status: ACTIVE | COMMUNITY
Start: 1900-01-01 | End: 1900-01-01

## 2025-08-14 RX ORDER — ASPIRIN 81 MG/1
81 TABLET, COATED ORAL DAILY
Qty: 90 | Refills: 1 | Status: ACTIVE | COMMUNITY
Start: 1900-01-01 | End: 1900-01-01

## 2025-08-14 RX ORDER — AMLODIPINE BESYLATE 5 MG/1
5 TABLET ORAL
Qty: 90 | Refills: 1 | Status: ACTIVE | COMMUNITY
Start: 1900-01-01 | End: 1900-01-01

## 2025-08-14 RX ORDER — VALSARTAN 160 MG/1
160 TABLET, COATED ORAL DAILY
Qty: 90 | Refills: 1 | Status: ACTIVE | COMMUNITY
Start: 1900-01-01 | End: 1900-01-01

## 2025-08-14 RX ORDER — VALSARTAN 40 MG/1
TABLET, COATED ORAL
Refills: 0 | Status: DISCONTINUED | COMMUNITY
End: 2025-08-14

## 2025-08-14 RX ORDER — SPIRONOLACTONE 25 MG/1
25 TABLET ORAL TWICE DAILY
Qty: 180 | Refills: 1 | Status: ACTIVE | COMMUNITY
Start: 1900-01-01 | End: 1900-01-01

## 2025-08-14 RX ORDER — APIXABAN 5 MG/1
5 TABLET, FILM COATED ORAL
Qty: 180 | Refills: 1 | Status: ACTIVE | COMMUNITY
Start: 1900-01-01 | End: 1900-01-01

## 2025-08-19 ENCOUNTER — APPOINTMENT (OUTPATIENT)
Dept: ORTHOPEDIC SURGERY | Facility: CLINIC | Age: 50
End: 2025-08-19
Payer: COMMERCIAL

## 2025-08-19 ENCOUNTER — TRANSCRIPTION ENCOUNTER (OUTPATIENT)
Age: 50
End: 2025-08-19

## 2025-08-19 VITALS — HEIGHT: 65 IN | BODY MASS INDEX: 29.99 KG/M2 | WEIGHT: 180 LBS

## 2025-08-19 DIAGNOSIS — S69.91XD UNSPECIFIED INJURY OF RIGHT WRIST, HAND AND FINGER(S), SUBSEQUENT ENCOUNTER: ICD-10-CM

## 2025-08-19 LAB
ALBUMIN SERPL ELPH-MCNC: 4.4 G/DL
ALP BLD-CCNC: 74 U/L
ALT SERPL-CCNC: 10 U/L
ANION GAP SERPL CALC-SCNC: 13 MMOL/L
APPEARANCE: CLEAR
AST SERPL-CCNC: 19 U/L
BACTERIA: NEGATIVE /HPF
BASOPHILS # BLD AUTO: 0.06 K/UL
BASOPHILS NFR BLD AUTO: 0.8 %
BILIRUB SERPL-MCNC: 0.5 MG/DL
BILIRUBIN URINE: NEGATIVE
BLOOD URINE: NEGATIVE
BUN SERPL-MCNC: 19 MG/DL
C TRACH RRNA SPEC QL NAA+PROBE: NOT DETECTED
CALCIUM SERPL-MCNC: 10 MG/DL
CAST: 1 /LPF
CHLORIDE SERPL-SCNC: 105 MMOL/L
CHOLEST SERPL-MCNC: 234 MG/DL
CO2 SERPL-SCNC: 24 MMOL/L
COLOR: YELLOW
CREAT SERPL-MCNC: 0.9 MG/DL
EGFRCR SERPLBLD CKD-EPI 2021: 78 ML/MIN/1.73M2
EOSINOPHIL # BLD AUTO: 0.27 K/UL
EOSINOPHIL NFR BLD AUTO: 3.6 %
EPITHELIAL CELLS: 3 /HPF
ESTIMATED AVERAGE GLUCOSE: 120 MG/DL
GLUCOSE QUALITATIVE U: NEGATIVE MG/DL
GLUCOSE SERPL-MCNC: 93 MG/DL
HBA1C MFR BLD HPLC: 5.8 %
HBV SURFACE AB SER QL: REACTIVE
HBV SURFACE AG SER QL: NONREACTIVE
HCT VFR BLD CALC: 40.3 %
HCV AB SER QL: NONREACTIVE
HCV S/CO RATIO: 0.17 S/CO
HDLC SERPL-MCNC: 68 MG/DL
HGB BLD-MCNC: 12.8 G/DL
HIV1+2 AB SPEC QL IA.RAPID: NONREACTIVE
IMM GRANULOCYTES NFR BLD AUTO: 0.1 %
KETONES URINE: ABNORMAL MG/DL
LDLC SERPL-MCNC: 155 MG/DL
LEUKOCYTE ESTERASE URINE: ABNORMAL
LYMPHOCYTES # BLD AUTO: 3.46 K/UL
LYMPHOCYTES NFR BLD AUTO: 46.2 %
MAN DIFF?: NORMAL
MCHC RBC-ENTMCNC: 27.6 PG
MCHC RBC-ENTMCNC: 31.8 G/DL
MCV RBC AUTO: 87 FL
MICROSCOPIC-UA: NORMAL
MONOCYTES # BLD AUTO: 0.46 K/UL
MONOCYTES NFR BLD AUTO: 6.1 %
N GONORRHOEA RRNA SPEC QL NAA+PROBE: NOT DETECTED
NEUTROPHILS # BLD AUTO: 3.23 K/UL
NEUTROPHILS NFR BLD AUTO: 43.2 %
NITRITE URINE: NEGATIVE
NONHDLC SERPL-MCNC: 166 MG/DL
PH URINE: 6
PLATELET # BLD AUTO: 219 K/UL
POTASSIUM SERPL-SCNC: 4.2 MMOL/L
PROT SERPL-MCNC: 7.9 G/DL
PROTEIN URINE: NORMAL MG/DL
RBC # BLD: 4.63 M/UL
RBC # FLD: 15.6 %
RED BLOOD CELLS URINE: 1 /HPF
SODIUM SERPL-SCNC: 142 MMOL/L
SOURCE AMPLIFICATION: NORMAL
SPECIFIC GRAVITY URINE: 1.03
T PALLIDUM AB SER QL IA: NEGATIVE
TRIGL SERPL-MCNC: 65 MG/DL
TSH SERPL-ACNC: 0.82 UIU/ML
UROBILINOGEN URINE: 1 MG/DL
WBC # FLD AUTO: 7.49 K/UL
WHITE BLOOD CELLS URINE: 0 /HPF

## 2025-08-19 PROCEDURE — 73140 X-RAY EXAM OF FINGER(S): CPT | Mod: 26,RT

## 2025-08-19 PROCEDURE — 99203 OFFICE O/P NEW LOW 30 MIN: CPT

## 2025-09-18 ENCOUNTER — APPOINTMENT (OUTPATIENT)
Dept: INTERNAL MEDICINE | Facility: CLINIC | Age: 50
End: 2025-09-18